# Patient Record
Sex: MALE | Race: WHITE | Employment: OTHER | URBAN - METROPOLITAN AREA
[De-identification: names, ages, dates, MRNs, and addresses within clinical notes are randomized per-mention and may not be internally consistent; named-entity substitution may affect disease eponyms.]

---

## 2017-02-22 ENCOUNTER — OFFICE VISIT (OUTPATIENT)
Dept: FAMILY MEDICINE CLINIC | Age: 67
End: 2017-02-22

## 2017-02-22 VITALS
OXYGEN SATURATION: 97 % | HEART RATE: 74 BPM | BODY MASS INDEX: 24.55 KG/M2 | WEIGHT: 162 LBS | DIASTOLIC BLOOD PRESSURE: 84 MMHG | HEIGHT: 68 IN | SYSTOLIC BLOOD PRESSURE: 140 MMHG

## 2017-02-22 DIAGNOSIS — Z00.00 ROUTINE GENERAL MEDICAL EXAMINATION AT A HEALTH CARE FACILITY: Primary | ICD-10-CM

## 2017-02-22 PROCEDURE — G0438 PPPS, INITIAL VISIT: HCPCS | Performed by: FAMILY MEDICINE

## 2017-02-22 ASSESSMENT — LIFESTYLE VARIABLES: HOW OFTEN DO YOU HAVE A DRINK CONTAINING ALCOHOL: 0

## 2017-02-22 ASSESSMENT — PATIENT HEALTH QUESTIONNAIRE - PHQ9: SUM OF ALL RESPONSES TO PHQ QUESTIONS 1-9: 0

## 2017-02-22 ASSESSMENT — ANXIETY QUESTIONNAIRES: GAD7 TOTAL SCORE: 0

## 2017-04-17 RX ORDER — ATORVASTATIN CALCIUM 80 MG/1
TABLET, FILM COATED ORAL
Qty: 90 TABLET | Refills: 0 | Status: SHIPPED | OUTPATIENT
Start: 2017-04-17 | End: 2017-04-19

## 2017-04-19 RX ORDER — ATORVASTATIN CALCIUM 80 MG/1
TABLET, FILM COATED ORAL
Qty: 90 TABLET | Refills: 0 | Status: SHIPPED | OUTPATIENT
Start: 2017-04-19 | End: 2018-01-17 | Stop reason: SDUPTHER

## 2017-05-10 ENCOUNTER — OFFICE VISIT (OUTPATIENT)
Dept: FAMILY MEDICINE CLINIC | Age: 67
End: 2017-05-10

## 2017-05-10 VITALS
OXYGEN SATURATION: 97 % | SYSTOLIC BLOOD PRESSURE: 118 MMHG | WEIGHT: 158 LBS | HEART RATE: 74 BPM | DIASTOLIC BLOOD PRESSURE: 70 MMHG | BODY MASS INDEX: 23.95 KG/M2 | HEIGHT: 68 IN

## 2017-05-10 DIAGNOSIS — E53.8 VITAMIN B 12 DEFICIENCY: ICD-10-CM

## 2017-05-10 DIAGNOSIS — E78.2 MIXED HYPERLIPIDEMIA: ICD-10-CM

## 2017-05-10 DIAGNOSIS — E05.90 HYPERTHYROIDISM: ICD-10-CM

## 2017-05-10 DIAGNOSIS — Z12.5 SPECIAL SCREENING FOR MALIGNANT NEOPLASM OF PROSTATE: ICD-10-CM

## 2017-05-10 DIAGNOSIS — F32.A DEPRESSION, UNSPECIFIED DEPRESSION TYPE: ICD-10-CM

## 2017-05-10 DIAGNOSIS — I10 ESSENTIAL HYPERTENSION: Primary | ICD-10-CM

## 2017-05-10 DIAGNOSIS — F17.200 TOBACCO DEPENDENCE: ICD-10-CM

## 2017-05-10 LAB
A/G RATIO: 1.8 (ref 1.1–2.2)
ALBUMIN SERPL-MCNC: 4.4 G/DL (ref 3.4–5)
ALP BLD-CCNC: 91 U/L (ref 40–129)
ALT SERPL-CCNC: 20 U/L (ref 10–40)
ANION GAP SERPL CALCULATED.3IONS-SCNC: 14 MMOL/L (ref 3–16)
AST SERPL-CCNC: 20 U/L (ref 15–37)
BASOPHILS ABSOLUTE: 0 K/UL (ref 0–0.2)
BASOPHILS RELATIVE PERCENT: 0.6 %
BILIRUB SERPL-MCNC: 0.4 MG/DL (ref 0–1)
BUN BLDV-MCNC: 18 MG/DL (ref 7–20)
CALCIUM SERPL-MCNC: 10 MG/DL (ref 8.3–10.6)
CHLORIDE BLD-SCNC: 100 MMOL/L (ref 99–110)
CHOLESTEROL, TOTAL: 179 MG/DL (ref 0–199)
CO2: 28 MMOL/L (ref 21–32)
CREAT SERPL-MCNC: 0.9 MG/DL (ref 0.8–1.3)
EOSINOPHILS ABSOLUTE: 0.1 K/UL (ref 0–0.6)
EOSINOPHILS RELATIVE PERCENT: 1.1 %
FOLATE: 9.6 NG/ML (ref 4.78–24.2)
GFR AFRICAN AMERICAN: >60
GFR NON-AFRICAN AMERICAN: >60
GLOBULIN: 2.4 G/DL
GLUCOSE BLD-MCNC: 99 MG/DL (ref 70–99)
HCT VFR BLD CALC: 51 % (ref 40.5–52.5)
HDLC SERPL-MCNC: 64 MG/DL (ref 40–60)
HEMOGLOBIN: 16.1 G/DL (ref 13.5–17.5)
LDL CHOLESTEROL CALCULATED: 88 MG/DL
LYMPHOCYTES ABSOLUTE: 2.1 K/UL (ref 1–5.1)
LYMPHOCYTES RELATIVE PERCENT: 30.3 %
MCH RBC QN AUTO: 27.1 PG (ref 26–34)
MCHC RBC AUTO-ENTMCNC: 31.5 G/DL (ref 31–36)
MCV RBC AUTO: 86 FL (ref 80–100)
MONOCYTES ABSOLUTE: 0.8 K/UL (ref 0–1.3)
MONOCYTES RELATIVE PERCENT: 11.6 %
NEUTROPHILS ABSOLUTE: 3.8 K/UL (ref 1.7–7.7)
NEUTROPHILS RELATIVE PERCENT: 56.4 %
PDW BLD-RTO: 16.2 % (ref 12.4–15.4)
PLATELET # BLD: 292 K/UL (ref 135–450)
PMV BLD AUTO: 8.9 FL (ref 5–10.5)
POTASSIUM SERPL-SCNC: 5.1 MMOL/L (ref 3.5–5.1)
PROSTATE SPECIFIC ANTIGEN: 1.61 NG/ML (ref 0–4)
RBC # BLD: 5.93 M/UL (ref 4.2–5.9)
SODIUM BLD-SCNC: 142 MMOL/L (ref 136–145)
TOTAL PROTEIN: 6.8 G/DL (ref 6.4–8.2)
TRIGL SERPL-MCNC: 135 MG/DL (ref 0–150)
VITAMIN B-12: 289 PG/ML (ref 211–911)
VLDLC SERPL CALC-MCNC: 27 MG/DL
WBC # BLD: 6.8 K/UL (ref 4–11)

## 2017-05-10 PROCEDURE — G8598 ASA/ANTIPLAT THER USED: HCPCS | Performed by: FAMILY MEDICINE

## 2017-05-10 PROCEDURE — 1123F ACP DISCUSS/DSCN MKR DOCD: CPT | Performed by: FAMILY MEDICINE

## 2017-05-10 PROCEDURE — 3017F COLORECTAL CA SCREEN DOC REV: CPT | Performed by: FAMILY MEDICINE

## 2017-05-10 PROCEDURE — 4040F PNEUMOC VAC/ADMIN/RCVD: CPT | Performed by: FAMILY MEDICINE

## 2017-05-10 PROCEDURE — 36415 COLL VENOUS BLD VENIPUNCTURE: CPT | Performed by: FAMILY MEDICINE

## 2017-05-10 PROCEDURE — 99214 OFFICE O/P EST MOD 30 MIN: CPT | Performed by: FAMILY MEDICINE

## 2017-05-10 PROCEDURE — G8420 CALC BMI NORM PARAMETERS: HCPCS | Performed by: FAMILY MEDICINE

## 2017-05-10 PROCEDURE — G8427 DOCREV CUR MEDS BY ELIG CLIN: HCPCS | Performed by: FAMILY MEDICINE

## 2017-05-10 PROCEDURE — 4004F PT TOBACCO SCREEN RCVD TLK: CPT | Performed by: FAMILY MEDICINE

## 2017-05-10 RX ORDER — SERTRALINE HYDROCHLORIDE 100 MG/1
TABLET, FILM COATED ORAL
Qty: 90 TABLET | Refills: 3 | Status: SHIPPED | OUTPATIENT
Start: 2017-05-10 | End: 2018-09-02 | Stop reason: SDUPTHER

## 2017-05-10 RX ORDER — MOEXIPRIL HCL 15 MG
TABLET ORAL
Qty: 90 TABLET | Refills: 3 | Status: SHIPPED | OUTPATIENT
Start: 2017-05-10 | End: 2018-09-02 | Stop reason: SDUPTHER

## 2017-05-10 ASSESSMENT — ENCOUNTER SYMPTOMS
RESPIRATORY NEGATIVE: 1
GASTROINTESTINAL NEGATIVE: 1

## 2017-05-15 ENCOUNTER — HOSPITAL ENCOUNTER (OUTPATIENT)
Dept: GENERAL RADIOLOGY | Age: 67
Discharge: HOME | End: 2017-05-15
Attending: INTERNAL MEDICINE | Admitting: INTERNAL MEDICINE

## 2017-05-15 ENCOUNTER — OFFICE VISIT (OUTPATIENT)
Dept: ENDOCRINOLOGY | Age: 67
End: 2017-05-15

## 2017-05-15 VITALS
HEART RATE: 90 BPM | WEIGHT: 157.4 LBS | HEIGHT: 68 IN | BODY MASS INDEX: 23.86 KG/M2 | OXYGEN SATURATION: 98 % | DIASTOLIC BLOOD PRESSURE: 81 MMHG | RESPIRATION RATE: 18 BRPM | SYSTOLIC BLOOD PRESSURE: 147 MMHG

## 2017-05-15 DIAGNOSIS — E05.90 HYPERTHYROIDISM: ICD-10-CM

## 2017-05-15 DIAGNOSIS — E05.90 HYPERTHYROIDISM: Primary | ICD-10-CM

## 2017-05-15 LAB
T4 FREE: 1.1 NG/DL (ref 0.9–1.8)
TSH SERPL DL<=0.05 MIU/L-ACNC: 1.27 UIU/ML (ref 0.27–4.2)

## 2017-05-15 PROCEDURE — 3017F COLORECTAL CA SCREEN DOC REV: CPT | Performed by: INTERNAL MEDICINE

## 2017-05-15 PROCEDURE — 1123F ACP DISCUSS/DSCN MKR DOCD: CPT | Performed by: INTERNAL MEDICINE

## 2017-05-15 PROCEDURE — G8598 ASA/ANTIPLAT THER USED: HCPCS | Performed by: INTERNAL MEDICINE

## 2017-05-15 PROCEDURE — 4004F PT TOBACCO SCREEN RCVD TLK: CPT | Performed by: INTERNAL MEDICINE

## 2017-05-15 PROCEDURE — G8420 CALC BMI NORM PARAMETERS: HCPCS | Performed by: INTERNAL MEDICINE

## 2017-05-15 PROCEDURE — 4040F PNEUMOC VAC/ADMIN/RCVD: CPT | Performed by: INTERNAL MEDICINE

## 2017-05-15 PROCEDURE — G8427 DOCREV CUR MEDS BY ELIG CLIN: HCPCS | Performed by: INTERNAL MEDICINE

## 2017-05-15 PROCEDURE — 99213 OFFICE O/P EST LOW 20 MIN: CPT | Performed by: INTERNAL MEDICINE

## 2017-05-16 RX ORDER — METHIMAZOLE 5 MG/1
TABLET ORAL
Qty: 30 TABLET | Refills: 5 | Status: SHIPPED | OUTPATIENT
Start: 2017-05-16 | End: 2018-05-26 | Stop reason: SDUPTHER

## 2018-01-17 RX ORDER — ATORVASTATIN CALCIUM 80 MG/1
TABLET, FILM COATED ORAL
Qty: 90 TABLET | Refills: 0 | Status: SHIPPED | OUTPATIENT
Start: 2018-01-17 | End: 2018-06-06 | Stop reason: SDUPTHER

## 2018-02-26 ENCOUNTER — OFFICE VISIT (OUTPATIENT)
Dept: FAMILY MEDICINE CLINIC | Age: 68
End: 2018-02-26

## 2018-02-26 VITALS
DIASTOLIC BLOOD PRESSURE: 64 MMHG | BODY MASS INDEX: 24.71 KG/M2 | OXYGEN SATURATION: 97 % | WEIGHT: 163 LBS | HEIGHT: 68 IN | HEART RATE: 70 BPM | SYSTOLIC BLOOD PRESSURE: 146 MMHG

## 2018-02-26 DIAGNOSIS — Z00.00 ROUTINE GENERAL MEDICAL EXAMINATION AT A HEALTH CARE FACILITY: Primary | ICD-10-CM

## 2018-02-26 PROCEDURE — G8598 ASA/ANTIPLAT THER USED: HCPCS | Performed by: FAMILY MEDICINE

## 2018-02-26 PROCEDURE — G0439 PPPS, SUBSEQ VISIT: HCPCS | Performed by: FAMILY MEDICINE

## 2018-02-26 ASSESSMENT — ANXIETY QUESTIONNAIRES: GAD7 TOTAL SCORE: 0

## 2018-02-26 ASSESSMENT — PATIENT HEALTH QUESTIONNAIRE - PHQ9: SUM OF ALL RESPONSES TO PHQ QUESTIONS 1-9: 0

## 2018-02-26 ASSESSMENT — LIFESTYLE VARIABLES: HOW OFTEN DO YOU HAVE A DRINK CONTAINING ALCOHOL: 0

## 2018-02-26 NOTE — PROGRESS NOTES
follow up appointments were made and/or referrals ordered. Positive Risk Factor Screenings with Interventions:           Substance Abuse:  Social History     Tobacco History     Smoking Status  Current Every Day Smoker Smoking Frequency  1 pack/day for 20 years (20 pk yrs) Smoking Tobacco Type  Cigarettes    Smokeless Tobacco Use  Never Used          Alcohol History     Alcohol Use Status  No          Drug Use     Drug Use Status  No          Sexual Activity     Sexually Active  Not Asked               Audit Questionnaire: Screen for Alcohol Misuse  How often do you have a drink containing alcohol?: Never  Substance Abuse Interventions:  · None indicated    General Health:  General  In general, how would you say your health is?: Good  In the past 7 days, have you experienced any of the following?: None of These  Do you get the social and emotional support that you need?: Yes  Do you have a Living Will?: (!) No  General Health Risk Interventions:  · None indicated    Health Habits/Nutrition:  Health Habits/Nutrition  Do you exercise for at least 20 minutes 2-3 times per week?: (!) No  Have you lost any weight without trying in the past 3 months?: No  Do you eat fewer than 2 meals per day?: No  Have you seen a dentist within the past year?: (!) No  Body mass index is 24.78 kg/m².   Health Habits/Nutrition Interventions:  · None indicated    Hearing/Vision:  Hearing/Vision  Do you or your family notice any trouble with your hearing?: No  Do you have difficulty driving, watching TV, or doing any of your daily activities because of your eyesight?: No  Have you had an eye exam within the past year?: (!) No  Hearing/Vision Interventions:  · None indicated    Safety:  Safety  Do you have working smoke detectors?: Yes  Have all throw rugs been removed or fastened?: Yes  Do you have non-slip mats in all bathtubs?: (!) No  Do all of your stairways have a railing or banister?: Yes  Are your doorways, halls and stairs free of

## 2018-02-26 NOTE — PATIENT INSTRUCTIONS
Personalized Preventive Plan for Gio Crespo - 2/26/2018  Medicare offers a range of preventive health benefits. Some of the tests and screenings are paid in full while other may be subject to a deductible, co-insurance, and/or copay. Some of these benefits include a comprehensive review of your medical history including lifestyle, illnesses that may run in your family, and various assessments and screenings as appropriate. After reviewing your medical record and screening and assessments performed today your provider may have ordered immunizations, labs, imaging, and/or referrals for you. A list of these orders (if applicable) as well as your Preventive Care list are included within your After Visit Summary for your review. Other Preventive Recommendations:    · A preventive eye exam performed by an eye specialist is recommended every 1-2 years to screen for glaucoma; cataracts, macular degeneration, and other eye disorders. · A preventive dental visit is recommended every 6 months. · Try to get at least 150 minutes of exercise per week or 10,000 steps per day on a pedometer . · Order or download the FREE \"Exercise & Physical Activity: Your Everyday Guide\" from [employer]. Call 5-900.684.1934 or search [employer] online. · You need 2633-2025 mg of calcium and 6801-0809 IU of vitamin D per day. It is possible to meet your calcium requirement with diet alone, but a vitamin D supplement is usually necessary to meet this goal.  · When exposed to the sun, use a sunscreen that protects against both UVA and UVB radiation with an SPF of 30 or greater. Reapply every 2 to 3 hours or after sweating, drying off with a towel, or swimming. · Always wear a seat belt when traveling in a car. Always wear a helmet when riding a bicycle or motorcycle.

## 2018-05-10 ENCOUNTER — OFFICE VISIT (OUTPATIENT)
Dept: FAMILY MEDICINE CLINIC | Age: 68
End: 2018-05-10

## 2018-05-10 VITALS
SYSTOLIC BLOOD PRESSURE: 120 MMHG | HEART RATE: 69 BPM | OXYGEN SATURATION: 96 % | WEIGHT: 162 LBS | HEIGHT: 68 IN | DIASTOLIC BLOOD PRESSURE: 66 MMHG | BODY MASS INDEX: 24.55 KG/M2

## 2018-05-10 DIAGNOSIS — F17.200 TOBACCO DEPENDENCE: ICD-10-CM

## 2018-05-10 DIAGNOSIS — I10 ESSENTIAL HYPERTENSION: Primary | ICD-10-CM

## 2018-05-10 DIAGNOSIS — Z12.5 SPECIAL SCREENING FOR MALIGNANT NEOPLASM OF PROSTATE: ICD-10-CM

## 2018-05-10 DIAGNOSIS — I25.10 CORONARY ARTERY DISEASE INVOLVING NATIVE CORONARY ARTERY OF NATIVE HEART WITHOUT ANGINA PECTORIS: ICD-10-CM

## 2018-05-10 DIAGNOSIS — E55.9 VITAMIN D DEFICIENCY: ICD-10-CM

## 2018-05-10 DIAGNOSIS — E78.2 MIXED HYPERLIPIDEMIA: ICD-10-CM

## 2018-05-10 DIAGNOSIS — F32.A DEPRESSION, UNSPECIFIED DEPRESSION TYPE: ICD-10-CM

## 2018-05-10 LAB
A/G RATIO: 1.6 (ref 1.1–2.2)
ALBUMIN SERPL-MCNC: 4.2 G/DL (ref 3.4–5)
ALP BLD-CCNC: 86 U/L (ref 40–129)
ALT SERPL-CCNC: 16 U/L (ref 10–40)
ANION GAP SERPL CALCULATED.3IONS-SCNC: 11 MMOL/L (ref 3–16)
AST SERPL-CCNC: 19 U/L (ref 15–37)
BASOPHILS ABSOLUTE: 0 K/UL (ref 0–0.2)
BASOPHILS RELATIVE PERCENT: 0.6 %
BILIRUB SERPL-MCNC: <0.2 MG/DL (ref 0–1)
BUN BLDV-MCNC: 23 MG/DL (ref 7–20)
CALCIUM SERPL-MCNC: 9.4 MG/DL (ref 8.3–10.6)
CHLORIDE BLD-SCNC: 102 MMOL/L (ref 99–110)
CHOLESTEROL, TOTAL: 185 MG/DL (ref 0–199)
CO2: 28 MMOL/L (ref 21–32)
CREAT SERPL-MCNC: 0.8 MG/DL (ref 0.8–1.3)
EOSINOPHILS ABSOLUTE: 0.1 K/UL (ref 0–0.6)
EOSINOPHILS RELATIVE PERCENT: 1 %
GFR AFRICAN AMERICAN: >60
GFR NON-AFRICAN AMERICAN: >60
GLOBULIN: 2.6 G/DL
GLUCOSE BLD-MCNC: 93 MG/DL (ref 70–99)
HCT VFR BLD CALC: 48.5 % (ref 40.5–52.5)
HDLC SERPL-MCNC: 52 MG/DL (ref 40–60)
HEMOGLOBIN: 15.7 G/DL (ref 13.5–17.5)
LDL CHOLESTEROL CALCULATED: 98 MG/DL
LYMPHOCYTES ABSOLUTE: 2.3 K/UL (ref 1–5.1)
LYMPHOCYTES RELATIVE PERCENT: 33.8 %
MCH RBC QN AUTO: 27.2 PG (ref 26–34)
MCHC RBC AUTO-ENTMCNC: 32.4 G/DL (ref 31–36)
MCV RBC AUTO: 84 FL (ref 80–100)
MONOCYTES ABSOLUTE: 0.9 K/UL (ref 0–1.3)
MONOCYTES RELATIVE PERCENT: 13.4 %
NEUTROPHILS ABSOLUTE: 3.6 K/UL (ref 1.7–7.7)
NEUTROPHILS RELATIVE PERCENT: 51.2 %
PDW BLD-RTO: 17 % (ref 12.4–15.4)
PLATELET # BLD: 253 K/UL (ref 135–450)
PMV BLD AUTO: 9 FL (ref 5–10.5)
POTASSIUM SERPL-SCNC: 4.9 MMOL/L (ref 3.5–5.1)
PROSTATE SPECIFIC ANTIGEN: 1.49 NG/ML (ref 0–4)
RBC # BLD: 5.77 M/UL (ref 4.2–5.9)
SODIUM BLD-SCNC: 141 MMOL/L (ref 136–145)
TOTAL PROTEIN: 6.8 G/DL (ref 6.4–8.2)
TRIGL SERPL-MCNC: 173 MG/DL (ref 0–150)
VITAMIN D 25-HYDROXY: 19.9 NG/ML
VLDLC SERPL CALC-MCNC: 35 MG/DL
WBC # BLD: 6.9 K/UL (ref 4–11)

## 2018-05-10 PROCEDURE — G8598 ASA/ANTIPLAT THER USED: HCPCS | Performed by: FAMILY MEDICINE

## 2018-05-10 PROCEDURE — G8427 DOCREV CUR MEDS BY ELIG CLIN: HCPCS | Performed by: FAMILY MEDICINE

## 2018-05-10 PROCEDURE — 1123F ACP DISCUSS/DSCN MKR DOCD: CPT | Performed by: FAMILY MEDICINE

## 2018-05-10 PROCEDURE — G8420 CALC BMI NORM PARAMETERS: HCPCS | Performed by: FAMILY MEDICINE

## 2018-05-10 PROCEDURE — 4004F PT TOBACCO SCREEN RCVD TLK: CPT | Performed by: FAMILY MEDICINE

## 2018-05-10 PROCEDURE — 3017F COLORECTAL CA SCREEN DOC REV: CPT | Performed by: FAMILY MEDICINE

## 2018-05-10 PROCEDURE — 4040F PNEUMOC VAC/ADMIN/RCVD: CPT | Performed by: FAMILY MEDICINE

## 2018-05-10 PROCEDURE — 99214 OFFICE O/P EST MOD 30 MIN: CPT | Performed by: FAMILY MEDICINE

## 2018-05-10 ASSESSMENT — ENCOUNTER SYMPTOMS
RESPIRATORY NEGATIVE: 1
GASTROINTESTINAL NEGATIVE: 1

## 2018-06-06 ENCOUNTER — OFFICE VISIT (OUTPATIENT)
Dept: ENDOCRINOLOGY | Age: 68
End: 2018-06-06

## 2018-06-06 ENCOUNTER — HOSPITAL ENCOUNTER (OUTPATIENT)
Dept: GENERAL RADIOLOGY | Age: 68
Discharge: HOME | End: 2018-06-06
Attending: NURSE PRACTITIONER | Admitting: NURSE PRACTITIONER

## 2018-06-06 VITALS
OXYGEN SATURATION: 96 % | HEART RATE: 62 BPM | SYSTOLIC BLOOD PRESSURE: 116 MMHG | WEIGHT: 161.4 LBS | BODY MASS INDEX: 24.46 KG/M2 | DIASTOLIC BLOOD PRESSURE: 72 MMHG | HEIGHT: 68 IN

## 2018-06-06 DIAGNOSIS — E05.90 HYPERTHYROIDISM: ICD-10-CM

## 2018-06-06 DIAGNOSIS — F17.200 TOBACCO DEPENDENCE: ICD-10-CM

## 2018-06-06 DIAGNOSIS — R73.09 ELEVATED GLUCOSE: Primary | ICD-10-CM

## 2018-06-06 DIAGNOSIS — R73.09 ELEVATED GLUCOSE: ICD-10-CM

## 2018-06-06 PROBLEM — E78.2 ELEVATED TRIGLYCERIDES WITH HIGH CHOLESTEROL: Status: ACTIVE | Noted: 2018-06-06

## 2018-06-06 PROBLEM — E78.2 ELEVATED TRIGLYCERIDES WITH HIGH CHOLESTEROL: Status: RESOLVED | Noted: 2018-06-06 | Resolved: 2018-06-06

## 2018-06-06 LAB
T4 FREE: 1.2 NG/DL (ref 0.9–1.8)
TSH SERPL DL<=0.05 MIU/L-ACNC: 1.52 UIU/ML (ref 0.27–4.2)

## 2018-06-06 PROCEDURE — G8427 DOCREV CUR MEDS BY ELIG CLIN: HCPCS | Performed by: NURSE PRACTITIONER

## 2018-06-06 PROCEDURE — 3017F COLORECTAL CA SCREEN DOC REV: CPT | Performed by: NURSE PRACTITIONER

## 2018-06-06 PROCEDURE — 4004F PT TOBACCO SCREEN RCVD TLK: CPT | Performed by: NURSE PRACTITIONER

## 2018-06-06 PROCEDURE — G8598 ASA/ANTIPLAT THER USED: HCPCS | Performed by: NURSE PRACTITIONER

## 2018-06-06 PROCEDURE — 4040F PNEUMOC VAC/ADMIN/RCVD: CPT | Performed by: NURSE PRACTITIONER

## 2018-06-06 PROCEDURE — 99214 OFFICE O/P EST MOD 30 MIN: CPT | Performed by: NURSE PRACTITIONER

## 2018-06-06 PROCEDURE — G8420 CALC BMI NORM PARAMETERS: HCPCS | Performed by: NURSE PRACTITIONER

## 2018-06-06 PROCEDURE — 1123F ACP DISCUSS/DSCN MKR DOCD: CPT | Performed by: NURSE PRACTITIONER

## 2018-06-06 RX ORDER — METHIMAZOLE 5 MG/1
TABLET ORAL
Qty: 90 TABLET | Refills: 1 | Status: SHIPPED | OUTPATIENT
Start: 2018-06-06 | End: 2019-02-14 | Stop reason: SDUPTHER

## 2018-06-06 ASSESSMENT — ENCOUNTER SYMPTOMS
ABDOMINAL PAIN: 0
SHORTNESS OF BREATH: 0
CONSTIPATION: 0
DIARRHEA: 0
BACK PAIN: 0
EYE PAIN: 0
COLOR CHANGE: 0

## 2018-06-06 ASSESSMENT — PATIENT HEALTH QUESTIONNAIRE - PHQ9
1. LITTLE INTEREST OR PLEASURE IN DOING THINGS: 0
SUM OF ALL RESPONSES TO PHQ QUESTIONS 1-9: 0
2. FEELING DOWN, DEPRESSED OR HOPELESS: 0
SUM OF ALL RESPONSES TO PHQ9 QUESTIONS 1 & 2: 0

## 2018-06-07 LAB
ESTIMATED AVERAGE GLUCOSE: 128.4 MG/DL
HBA1C MFR BLD: 6.1 %

## 2018-09-04 NOTE — TELEPHONE ENCOUNTER
.  Last office visit 5/10/2018     Last written 5-10-17 390 with 3 refills      Next office visit scheduled 5-13-19    Requested Prescriptions     Pending Prescriptions Disp Refills    sertraline (ZOLOFT) 100 MG tablet [Pharmacy Med Name: SERTRALINE 100MG TAB] 90 tablet 3     Sig: TAKE ONE TABLET BY MOUTH ONCE DAILY    moexipril (UNIVASC) 15 MG tablet [Pharmacy Med Name: MOEXIPRIL 15MG      TAB] 90 tablet 3     Sig: TAKE ONE TABLET BY MOUTH ONCE DAILY

## 2018-09-05 RX ORDER — MOEXIPRIL HCL 15 MG
TABLET ORAL
Qty: 90 TABLET | Refills: 3 | Status: SHIPPED | OUTPATIENT
Start: 2018-09-05 | End: 2019-10-18 | Stop reason: SDUPTHER

## 2018-09-05 RX ORDER — SERTRALINE HYDROCHLORIDE 100 MG/1
TABLET, FILM COATED ORAL
Qty: 90 TABLET | Refills: 3 | Status: SHIPPED | OUTPATIENT
Start: 2018-09-05 | End: 2019-09-20 | Stop reason: SDUPTHER

## 2019-02-14 DIAGNOSIS — E05.90 HYPERTHYROIDISM: Primary | ICD-10-CM

## 2019-04-02 DIAGNOSIS — E05.90 HYPERTHYROIDISM: ICD-10-CM

## 2019-04-03 ENCOUNTER — TELEPHONE (OUTPATIENT)
Dept: FAMILY MEDICINE CLINIC | Age: 69
End: 2019-04-03

## 2019-04-03 DIAGNOSIS — E05.90 HYPERTHYROIDISM: ICD-10-CM

## 2019-04-03 RX ORDER — METHIMAZOLE 5 MG/1
5 TABLET ORAL DAILY
Qty: 90 TABLET | Refills: 0 | OUTPATIENT
Start: 2019-04-03

## 2019-04-03 RX ORDER — METHIMAZOLE 5 MG/1
TABLET ORAL
Qty: 30 TABLET | Refills: 3 | Status: SHIPPED | OUTPATIENT
Start: 2019-04-03 | End: 2019-07-08 | Stop reason: SDUPTHER

## 2019-04-05 NOTE — TELEPHONE ENCOUNTER
Pt informed that I called the pharmacy and gave verbal order to change the methimazole Rx from 30 days with 3 refills to 90 days with no refills. We can renew the Rx at this apt in June.

## 2019-05-13 ENCOUNTER — OFFICE VISIT (OUTPATIENT)
Dept: FAMILY MEDICINE CLINIC | Age: 69
End: 2019-05-13
Payer: MEDICARE

## 2019-05-13 VITALS
DIASTOLIC BLOOD PRESSURE: 68 MMHG | BODY MASS INDEX: 27.19 KG/M2 | HEIGHT: 68 IN | OXYGEN SATURATION: 96 % | HEART RATE: 66 BPM | WEIGHT: 179.4 LBS | SYSTOLIC BLOOD PRESSURE: 128 MMHG

## 2019-05-13 DIAGNOSIS — Z12.5 SPECIAL SCREENING FOR MALIGNANT NEOPLASM OF PROSTATE: ICD-10-CM

## 2019-05-13 DIAGNOSIS — K76.9 LESION OF LIVER: ICD-10-CM

## 2019-05-13 DIAGNOSIS — F32.A DEPRESSION, UNSPECIFIED DEPRESSION TYPE: ICD-10-CM

## 2019-05-13 DIAGNOSIS — I10 ESSENTIAL HYPERTENSION: Primary | ICD-10-CM

## 2019-05-13 DIAGNOSIS — R73.9 HYPERGLYCEMIA: ICD-10-CM

## 2019-05-13 DIAGNOSIS — E78.2 MIXED HYPERLIPIDEMIA: ICD-10-CM

## 2019-05-13 DIAGNOSIS — N28.9 KIDNEY LESION: ICD-10-CM

## 2019-05-13 LAB
A/G RATIO: 1.4 (ref 1.1–2.2)
ALBUMIN SERPL-MCNC: 4.2 G/DL (ref 3.4–5)
ALP BLD-CCNC: 92 U/L (ref 40–129)
ALT SERPL-CCNC: 18 U/L (ref 10–40)
ANION GAP SERPL CALCULATED.3IONS-SCNC: 11 MMOL/L (ref 3–16)
AST SERPL-CCNC: 19 U/L (ref 15–37)
BASOPHILS ABSOLUTE: 0 K/UL (ref 0–0.2)
BASOPHILS RELATIVE PERCENT: 0.5 %
BILIRUB SERPL-MCNC: 0.4 MG/DL (ref 0–1)
BUN BLDV-MCNC: 19 MG/DL (ref 7–20)
CALCIUM SERPL-MCNC: 10 MG/DL (ref 8.3–10.6)
CHLORIDE BLD-SCNC: 102 MMOL/L (ref 99–110)
CHOLESTEROL, TOTAL: 206 MG/DL (ref 0–199)
CO2: 28 MMOL/L (ref 21–32)
CREAT SERPL-MCNC: 1.1 MG/DL (ref 0.8–1.3)
EOSINOPHILS ABSOLUTE: 0.1 K/UL (ref 0–0.6)
EOSINOPHILS RELATIVE PERCENT: 1.1 %
GFR AFRICAN AMERICAN: >60
GFR NON-AFRICAN AMERICAN: >60
GLOBULIN: 3.1 G/DL
GLUCOSE BLD-MCNC: 107 MG/DL (ref 70–99)
HCT VFR BLD CALC: 44.5 % (ref 40.5–52.5)
HDLC SERPL-MCNC: 55 MG/DL (ref 40–60)
HEMOGLOBIN: 14.3 G/DL (ref 13.5–17.5)
LDL CHOLESTEROL CALCULATED: 128 MG/DL
LYMPHOCYTES ABSOLUTE: 1.8 K/UL (ref 1–5.1)
LYMPHOCYTES RELATIVE PERCENT: 36.8 %
MCH RBC QN AUTO: 26.5 PG (ref 26–34)
MCHC RBC AUTO-ENTMCNC: 32.1 G/DL (ref 31–36)
MCV RBC AUTO: 82.7 FL (ref 80–100)
MONOCYTES ABSOLUTE: 0.9 K/UL (ref 0–1.3)
MONOCYTES RELATIVE PERCENT: 17.4 %
NEUTROPHILS ABSOLUTE: 2.2 K/UL (ref 1.7–7.7)
NEUTROPHILS RELATIVE PERCENT: 44.2 %
PDW BLD-RTO: 16.5 % (ref 12.4–15.4)
PLATELET # BLD: 233 K/UL (ref 135–450)
PMV BLD AUTO: 8.5 FL (ref 5–10.5)
POTASSIUM SERPL-SCNC: 5 MMOL/L (ref 3.5–5.1)
PROSTATE SPECIFIC ANTIGEN: 2.06 NG/ML (ref 0–4)
RBC # BLD: 5.38 M/UL (ref 4.2–5.9)
SODIUM BLD-SCNC: 141 MMOL/L (ref 136–145)
TOTAL PROTEIN: 7.3 G/DL (ref 6.4–8.2)
TRIGL SERPL-MCNC: 113 MG/DL (ref 0–150)
VLDLC SERPL CALC-MCNC: 23 MG/DL
WBC # BLD: 5 K/UL (ref 4–11)

## 2019-05-13 PROCEDURE — 3017F COLORECTAL CA SCREEN DOC REV: CPT | Performed by: FAMILY MEDICINE

## 2019-05-13 PROCEDURE — 99214 OFFICE O/P EST MOD 30 MIN: CPT | Performed by: FAMILY MEDICINE

## 2019-05-13 PROCEDURE — G8427 DOCREV CUR MEDS BY ELIG CLIN: HCPCS | Performed by: FAMILY MEDICINE

## 2019-05-13 PROCEDURE — G8598 ASA/ANTIPLAT THER USED: HCPCS | Performed by: FAMILY MEDICINE

## 2019-05-13 PROCEDURE — 4040F PNEUMOC VAC/ADMIN/RCVD: CPT | Performed by: FAMILY MEDICINE

## 2019-05-13 PROCEDURE — 1123F ACP DISCUSS/DSCN MKR DOCD: CPT | Performed by: FAMILY MEDICINE

## 2019-05-13 PROCEDURE — 1036F TOBACCO NON-USER: CPT | Performed by: FAMILY MEDICINE

## 2019-05-13 PROCEDURE — G8419 CALC BMI OUT NRM PARAM NOF/U: HCPCS | Performed by: FAMILY MEDICINE

## 2019-05-13 ASSESSMENT — ENCOUNTER SYMPTOMS
COUGH: 0
ABDOMINAL PAIN: 0
SHORTNESS OF BREATH: 0

## 2019-05-13 NOTE — PROGRESS NOTES
for sleep disturbance. The patient is not nervous/anxious. Objective:   Physical Exam      Physical Exam   Constitutional: He is oriented to person, place, and time. He appears well-developed and well-nourished. HENT:   Head: Normocephalic. Mouth/Throat: Oropharynx is clear and moist.   Eyes: Conjunctivae are normal.   Neck: Neck supple. Carotid bruit is not present. No thyromegaly present. Cardiovascular: Normal rate, regular rhythm and normal heart sounds. Pulmonary/Chest: Effort normal. He has decreased breath sounds. Abdominal: Soft. He exhibits no distension and no mass. There is no tenderness. Musculoskeletal: Normal range of motion. He exhibits no edema. Lymphadenopathy:     He has no cervical adenopathy. Neurological: He is alert and oriented to person, place, and time. Skin: Skin is warm and dry. Psychiatric: He has a normal mood and affect. His behavior is normal. Judgment and thought content normal.       Assessment:       Diagnosis Orders   1. Essential hypertension     2. Depression, unspecified depression type     3. Mixed hyperlipidemia     4. Special screening for malignant neoplasm of prostate           Plan:      Mateusz Esquivel was seen today for 1 year follow up.     Diagnoses and all orders for this visit:    Essential hypertension  Continue meds-ROBERTO diet-keep wt down  Depression, unspecified depression type  Continue meds-keep active  Mixed hyperlipidemia  Lab now  Special screening for malignant neoplasm of prostate  Lab now    Refer for Renal & Liver US    See me 1 yr  [place] Kelly DO

## 2019-05-13 NOTE — PATIENT INSTRUCTIONS
Essential hypertension  Continue meds-ROBERTO diet-keep wt down  Depression, unspecified depression type  Continue meds-keep active  Mixed hyperlipidemia  Lab now  Special screening for malignant neoplasm of prostate  Lab now        See me 1 yr

## 2019-05-14 LAB
ESTIMATED AVERAGE GLUCOSE: 128.4 MG/DL
HBA1C MFR BLD: 6.1 %

## 2019-05-20 ENCOUNTER — HOSPITAL ENCOUNTER (OUTPATIENT)
Dept: ULTRASOUND IMAGING | Age: 69
Discharge: HOME | End: 2019-05-20
Payer: MEDICARE

## 2019-05-20 DIAGNOSIS — N28.9 KIDNEY LESION: ICD-10-CM

## 2019-05-20 DIAGNOSIS — K76.9 LESION OF LIVER: ICD-10-CM

## 2019-05-20 PROCEDURE — 76775 US EXAM ABDO BACK WALL LIM: CPT

## 2019-05-20 PROCEDURE — 76705 ECHO EXAM OF ABDOMEN: CPT

## 2019-05-22 ENCOUNTER — TELEPHONE (OUTPATIENT)
Dept: FAMILY MEDICINE CLINIC | Age: 69
End: 2019-05-22

## 2019-05-22 DIAGNOSIS — N28.9 LESION OF BOTH NATIVE KIDNEYS: Primary | ICD-10-CM

## 2019-05-22 NOTE — RESULT ENCOUNTER NOTE
I just sent a [facility] to him to schedule an MRI of liver & kidneys--call him to make sure he reads message.

## 2019-05-30 ENCOUNTER — TELEPHONE (OUTPATIENT)
Dept: FAMILY MEDICINE CLINIC | Age: 69
End: 2019-05-30

## 2019-05-30 ENCOUNTER — HOSPITAL ENCOUNTER (OUTPATIENT)
Dept: MRI IMAGING | Age: 69
Discharge: HOME | End: 2019-05-30
Payer: MEDICARE

## 2019-05-30 DIAGNOSIS — N28.9 LESION OF BOTH NATIVE KIDNEYS: ICD-10-CM

## 2019-05-30 PROCEDURE — A9579 GAD-BASE MR CONTRAST NOS,1ML: HCPCS | Performed by: FAMILY MEDICINE

## 2019-05-30 PROCEDURE — 6360000004 HC RX CONTRAST MEDICATION: Performed by: FAMILY MEDICINE

## 2019-05-30 PROCEDURE — 74183 MRI ABD W/O CNTR FLWD CNTR: CPT

## 2019-05-30 RX ADMIN — GADOTERIDOL 15 ML: 279.3 INJECTION, SOLUTION INTRAVENOUS at 10:15

## 2019-05-30 NOTE — TELEPHONE ENCOUNTER
Scheduled for MRI at 9:30 am today. Asking what exactly the doctor wants imaged. Order is for abdomen w contrast. Wants to see liver and kidneys. Does he want a liver protocol? If so will not be a dedicated kidney protocol but you will still see the kidneys and they will be evaluated. To confirm it is to be done as ordered or with any questions please call Terese Watson at [phone].

## 2019-05-31 ENCOUNTER — TELEPHONE (OUTPATIENT)
Dept: FAMILY MEDICINE CLINIC | Age: 69
End: 2019-05-31

## 2019-06-05 ENCOUNTER — OFFICE VISIT (OUTPATIENT)
Dept: ENDOCRINOLOGY | Age: 69
End: 2019-06-05
Payer: MEDICARE

## 2019-06-05 ENCOUNTER — HOSPITAL ENCOUNTER (OUTPATIENT)
Age: 69
Discharge: HOME | End: 2019-06-05
Payer: MEDICARE

## 2019-06-05 VITALS
BODY MASS INDEX: 27.31 KG/M2 | HEIGHT: 68 IN | DIASTOLIC BLOOD PRESSURE: 73 MMHG | SYSTOLIC BLOOD PRESSURE: 126 MMHG | HEART RATE: 62 BPM | WEIGHT: 180.2 LBS

## 2019-06-05 DIAGNOSIS — R73.03 PREDIABETES: ICD-10-CM

## 2019-06-05 DIAGNOSIS — E05.90 HYPERTHYROIDISM: Primary | ICD-10-CM

## 2019-06-05 DIAGNOSIS — E05.90 HYPERTHYROIDISM: ICD-10-CM

## 2019-06-05 DIAGNOSIS — F17.200 TOBACCO DEPENDENCE: ICD-10-CM

## 2019-06-05 DIAGNOSIS — E04.1 THYROID NODULE: ICD-10-CM

## 2019-06-05 LAB
T3 FREE: 3.2 PG/ML (ref 2.3–4.2)
T4 FREE: 0.9 NG/DL (ref 0.9–1.8)
TSH SERPL DL<=0.05 MIU/L-ACNC: 2.53 UIU/ML (ref 0.27–4.2)

## 2019-06-05 PROCEDURE — 99214 OFFICE O/P EST MOD 30 MIN: CPT | Performed by: NURSE PRACTITIONER

## 2019-06-05 PROCEDURE — 1036F TOBACCO NON-USER: CPT | Performed by: NURSE PRACTITIONER

## 2019-06-05 PROCEDURE — 3017F COLORECTAL CA SCREEN DOC REV: CPT | Performed by: NURSE PRACTITIONER

## 2019-06-05 PROCEDURE — G8427 DOCREV CUR MEDS BY ELIG CLIN: HCPCS | Performed by: NURSE PRACTITIONER

## 2019-06-05 PROCEDURE — 84481 FREE ASSAY (FT-3): CPT

## 2019-06-05 PROCEDURE — 84443 ASSAY THYROID STIM HORMONE: CPT

## 2019-06-05 PROCEDURE — G8419 CALC BMI OUT NRM PARAM NOF/U: HCPCS | Performed by: NURSE PRACTITIONER

## 2019-06-05 PROCEDURE — 84439 ASSAY OF FREE THYROXINE: CPT

## 2019-06-05 PROCEDURE — 36415 COLL VENOUS BLD VENIPUNCTURE: CPT

## 2019-06-05 PROCEDURE — G8598 ASA/ANTIPLAT THER USED: HCPCS | Performed by: NURSE PRACTITIONER

## 2019-06-05 PROCEDURE — 4040F PNEUMOC VAC/ADMIN/RCVD: CPT | Performed by: NURSE PRACTITIONER

## 2019-06-05 PROCEDURE — 1123F ACP DISCUSS/DSCN MKR DOCD: CPT | Performed by: NURSE PRACTITIONER

## 2019-06-05 ASSESSMENT — ENCOUNTER SYMPTOMS
COLOR CHANGE: 0
CONSTIPATION: 0
EYE PAIN: 0
DIARRHEA: 0
ABDOMINAL PAIN: 0
SHORTNESS OF BREATH: 0
BACK PAIN: 0

## 2019-06-05 NOTE — PROGRESS NOTES
lengths of 5.4 and 4.7 cm for the right and   left lobes, respectively. There is uniform, homogeneous uptake   throughout both lobes without focal area of increased or decreased   uptake.      IMPRESSION: Findings may suggest a diffuse, toxic goiter, i.e.   Graves' disease. Uptake is within normal range though the final   determinant of toxicity is a suppressed serum TSH      US THYROID 05/14       COMPARISON: No prior ultrasound for comparison.       CLINICAL INDICATION: Thyromegaly. Hyperthyroidism.       FINDINGS: Right lobe of thyroid measures approximately 5.2 x 2.3 x   2.0 cm. An isoechoic nodule at the upper pole measures   approximately 1.2 x 0.7 x 0.9 cm. A hypoechoic nodule just   posterior measures approximately 0.5 x 0.4 x 0.3 cm.       Left lobe of thyroid measures approximately 5.5 x 2.3 x 1.4 cm. An   isoechoic nodule within measures approximately 0.7 x 0.6 x 1.0 cm. A cystic posterior nodule measures 1.0 x 0.8 x 0.8 cm. No   increased flow is seen to the region.       The isthmus measures 0.4 cm in AP dimension.           Impression   IMPRESSION:    1. Several small nonspecific thyroid nodules are seen bilaterally. No evidence of large dominant nodule.        Past Medical History:   Diagnosis Date    CAD (coronary artery disease)     Depression     ADJUSTMENT DISORDER    Heart attack ([facility] [address] 10/10    History of renal calculi     Hyperlipidemia     Hypertension     Hyperthyroidism      Family History   Problem Relation Age of Onset    Hearing Loss Mother     Heart Disease Mother     High Blood Pressure Mother     High Cholesterol Mother     Heart Disease Father     High Blood Pressure Father     High Cholesterol Father     Arthritis Sister     High Cholesterol Sister     Stroke Maternal Aunt      Current Outpatient Medications   Medication Sig Dispense Refill    methimazole (TAPAZOLE) 5 MG tablet TAKE 1 TABLET BY MOUTH ONCE DAILY (NEED  TO  MAKE  FOLLOW  UP  APPOINTMENT  FOR REFILLS) 30 tablet 3    sertraline (ZOLOFT) 100 MG tablet TAKE ONE TABLET BY MOUTH ONCE DAILY 90 tablet 3    moexipril (UNIVASC) 15 MG tablet TAKE ONE TABLET BY MOUTH ONCE DAILY 90 tablet 3    atorvastatin (LIPITOR) 80 MG tablet TAKE ONE TABLET BY MOUTH ONCE DAILY 90 tablet 3    metoprolol (TOPROL-XL) 25 MG XL tablet Take 25 mg by mouth daily.  aspirin 81 MG tablet Take 81 mg by mouth daily. otc       No current facility-administered medications for this visit. Review of Systems   Constitutional: Negative for activity change, appetite change, diaphoresis, fatigue and unexpected weight change. Eyes: Negative for pain and visual disturbance. Respiratory: Negative for shortness of breath. Cardiovascular: Negative for palpitations and leg swelling. Gastrointestinal: Negative for abdominal pain, constipation and diarrhea. Endocrine: Negative for cold intolerance, heat intolerance, polydipsia, polyphagia and polyuria. Musculoskeletal: Negative for back pain, gait problem, myalgias and neck pain. Skin: Negative for color change and pallor. Neurological: Negative for dizziness, weakness, light-headedness and headaches. Hematological: Does not bruise/bleed easily. Psychiatric/Behavioral: Negative for sleep disturbance. The patient is not nervous/anxious and is not hyperactive. Vitals:    06/05/19 1053   BP: 126/73   Pulse: 62   Weight: 180 lb 3.2 oz (81.7 kg)   Height: 5' 8\" (1.727 m)     Physical Exam   Constitutional: He is oriented to person, place, and time. He appears well-developed and well-nourished. HENT:   Head: Normocephalic and atraumatic. Eyes: Pupils are equal, round, and reactive to light. Conjunctivae and EOM are normal.   No evidence of proptosis   Neck: Normal range of motion. Neck supple. No thyromegaly present. Cardiovascular: Normal rate and regular rhythm. Pulmonary/Chest: Effort normal and breath sounds normal.   Abdominal: Soft.  Bowel sounds are normal. Musculoskeletal: Normal range of motion. He exhibits no edema or tenderness. Neurological: He is alert and oriented to person, place, and time. No tremors noted on outstretched arms   Skin: Skin is warm and dry. He is not diaphoretic. No skin changes b/l shins   Psychiatric: He has a normal mood and affect. His behavior is normal. Judgment and thought content normal.     Assessment  Manjeet Eng is a 69year old male with a h/o stable hyperthyroidism for > 5 years and controlled on 5 mg methimazole. Smokes 1 ppd for several years. He has a PMH of hypertension, hyperlipidemia, CAD s/p stent x 2, renal stones. Plan  Problem List Items Addressed This Visit     Tobacco dependence     Quit in January 2019  Reports significant of respiratory issues         Hyperthyroidism - Primary     Previous labs in ref range and stable  No new symptoms         Relevant Orders    TSH without Reflex (Completed)    T4, Free (Completed)    T3, Free (Completed)    US Head Neck Soft Tissue Thyroid    Thyroid nodule     Repeat US to evaluate any change in nodules - last  In 2014         Relevant Orders    US Head Neck Soft Tissue Thyroid    Prediabetes     Lab Results   Component Value Date    LABA1C 6.1 05/13/2019     Lab Results   Component Value Date    .4 05/13/2019     Reviewed dietary and lifestyle modifications  Drinks regular soda and beverages             Return in about 1 year (around 6/5/2020).

## 2019-06-06 NOTE — ASSESSMENT & PLAN NOTE
Lab Results   Component Value Date    LABA1C 6.1 05/13/2019     Lab Results   Component Value Date    .4 05/13/2019     Reviewed dietary and lifestyle modifications  Drinks regular soda and beverages

## 2019-06-11 ENCOUNTER — HOSPITAL ENCOUNTER (OUTPATIENT)
Dept: ULTRASOUND IMAGING | Age: 69
Discharge: HOME | End: 2019-06-11
Payer: MEDICARE

## 2019-06-11 DIAGNOSIS — E04.1 THYROID NODULE: ICD-10-CM

## 2019-06-11 DIAGNOSIS — E05.90 HYPERTHYROIDISM: ICD-10-CM

## 2019-06-11 PROCEDURE — 76536 US EXAM OF HEAD AND NECK: CPT

## 2019-06-24 ENCOUNTER — TELEPHONE (OUTPATIENT)
Dept: ENDOCRINOLOGY | Age: 69
End: 2019-06-24

## 2019-06-24 NOTE — TELEPHONE ENCOUNTER
Spoke to wife per [employer] and gave results    Reviewed results. No changes needed at this time.  Left VM for patient

## 2019-07-08 DIAGNOSIS — E05.90 HYPERTHYROIDISM: ICD-10-CM

## 2019-07-08 RX ORDER — METHIMAZOLE 5 MG/1
TABLET ORAL
Qty: 30 TABLET | Refills: 3 | Status: SHIPPED | OUTPATIENT
Start: 2019-07-08 | End: 2019-07-11 | Stop reason: SDUPTHER

## 2019-07-11 DIAGNOSIS — E05.90 HYPERTHYROIDISM: ICD-10-CM

## 2019-07-11 RX ORDER — METHIMAZOLE 5 MG/1
TABLET ORAL
Qty: 90 TABLET | Refills: 2 | Status: SHIPPED | OUTPATIENT
Start: 2019-07-11 | End: 2020-03-23 | Stop reason: SDUPTHER

## 2019-08-15 ENCOUNTER — TELEPHONE (OUTPATIENT)
Dept: FAMILY MEDICINE CLINIC | Age: 69
End: 2019-08-15

## 2020-03-23 RX ORDER — METHIMAZOLE 5 MG/1
TABLET ORAL
Qty: 90 TABLET | Refills: 0 | Status: SHIPPED | OUTPATIENT
Start: 2020-03-23 | End: 2020-05-19

## 2020-05-11 ENCOUNTER — TELEPHONE (OUTPATIENT)
Dept: FAMILY MEDICINE CLINIC | Age: 70
End: 2020-05-11

## 2020-05-11 RX ORDER — MOEXIPRIL HCL 15 MG
TABLET ORAL
Qty: 90 TABLET | Refills: 0 | Status: SHIPPED | OUTPATIENT
Start: 2020-05-11 | End: 2020-08-15 | Stop reason: SDUPTHER

## 2020-05-11 NOTE — TELEPHONE ENCOUNTER
Last office visit 5/13/2019     Last written 10-1-2019 x 3 months x 1 refill    Next office visit scheduled 5/13/2020    Requested Prescriptions     Pending Prescriptions Disp Refills    moexipril (UNIVASC) 15 MG tablet 90 tablet 1     Sig: TAKE 1 TABLET BY MOUTH ONCE DAILY

## 2020-05-18 NOTE — TELEPHONE ENCOUNTER
Medication:   Requested Prescriptions     Pending Prescriptions Disp Refills    methIMAzole (TAPAZOLE) 5 MG tablet [Pharmacy Med Name: METHIMAZOLE 5 MG Tablet] 90 tablet 0     Sig: TAKE 1 TABLET BY MOUTH ONCE DAILY (NEED  TO  MAKE  FOLLOW  UP  APPOINTMENT  FOR  REFILLS)       Last appt: 6/5/2019   Next appt: Visit date not found    Last OARRS: No flowsheet data found.

## 2020-05-19 RX ORDER — METHIMAZOLE 5 MG/1
TABLET ORAL
Qty: 30 TABLET | Refills: 0 | Status: SHIPPED | OUTPATIENT
Start: 2020-05-19 | End: 2020-06-23

## 2020-06-23 RX ORDER — METHIMAZOLE 5 MG/1
TABLET ORAL
Qty: 30 TABLET | Refills: 0 | Status: SHIPPED | OUTPATIENT
Start: 2020-06-23 | End: 2020-07-14

## 2020-07-13 NOTE — TELEPHONE ENCOUNTER
Medication:   Requested Prescriptions     Pending Prescriptions Disp Refills    methIMAzole (TAPAZOLE) 5 MG tablet [Pharmacy Med Name: METHIMAZOLE 5 MG Tablet] 30 tablet 0     Sig: TAKE 1 TABLET  ONCE DAILY (NEED  TO  MAKE  FOLLOW  UP  APPOINTMENT  FOR  REFILLS)         Last appt: 6/5/2019   Next appt: Visit date not found    Last OARRS: No flowsheet data found.

## 2020-07-14 RX ORDER — METHIMAZOLE 5 MG/1
TABLET ORAL
Qty: 30 TABLET | Refills: 0 | Status: SHIPPED | OUTPATIENT
Start: 2020-07-14 | End: 2020-08-05 | Stop reason: SDUPTHER

## 2020-08-05 RX ORDER — METHIMAZOLE 5 MG/1
TABLET ORAL
Qty: 30 TABLET | Refills: 0 | OUTPATIENT
Start: 2020-08-05

## 2020-08-05 RX ORDER — METHIMAZOLE 5 MG/1
TABLET ORAL
Qty: 30 TABLET | Refills: 0 | Status: SHIPPED | OUTPATIENT
Start: 2020-08-05 | End: 2020-08-06 | Stop reason: SDUPTHER

## 2020-08-06 ENCOUNTER — TELEPHONE (OUTPATIENT)
Dept: ENDOCRINOLOGY | Age: 70
End: 2020-08-06

## 2020-08-06 NOTE — TELEPHONE ENCOUNTER
Medication:   Requested Prescriptions     Pending Prescriptions Disp Refills    methIMAzole (TAPAZOLE) 5 MG tablet 30 tablet 0     Sig: Take 1 tablet by mouth once daily       Last appt: 06/05/2019  Next appt: Visit date not found    Last OARRS: No flowsheet data found.

## 2020-08-07 RX ORDER — METHIMAZOLE 5 MG/1
TABLET ORAL
Qty: 30 TABLET | Refills: 0 | Status: SHIPPED | OUTPATIENT
Start: 2020-08-07 | End: 2020-10-05

## 2020-08-26 ENCOUNTER — OFFICE VISIT (OUTPATIENT)
Dept: FAMILY MEDICINE CLINIC | Age: 70
End: 2020-08-26
Payer: MEDICARE

## 2020-08-26 VITALS
HEART RATE: 67 BPM | WEIGHT: 185 LBS | HEIGHT: 68 IN | OXYGEN SATURATION: 97 % | TEMPERATURE: 98.4 F | BODY MASS INDEX: 28.04 KG/M2 | DIASTOLIC BLOOD PRESSURE: 70 MMHG | SYSTOLIC BLOOD PRESSURE: 142 MMHG

## 2020-08-26 DIAGNOSIS — E05.90 HYPERTHYROIDISM: ICD-10-CM

## 2020-08-26 LAB
BASOPHILS ABSOLUTE: 0 K/UL (ref 0–0.2)
BASOPHILS RELATIVE PERCENT: 0.7 %
EOSINOPHILS ABSOLUTE: 0.1 K/UL (ref 0–0.6)
EOSINOPHILS RELATIVE PERCENT: 1 %
HCT VFR BLD CALC: 46.1 % (ref 40.5–52.5)
HEMOGLOBIN: 14.7 G/DL (ref 13.5–17.5)
LYMPHOCYTES ABSOLUTE: 1.8 K/UL (ref 1–5.1)
LYMPHOCYTES RELATIVE PERCENT: 31.2 %
MCH RBC QN AUTO: 26.4 PG (ref 26–34)
MCHC RBC AUTO-ENTMCNC: 31.9 G/DL (ref 31–36)
MCV RBC AUTO: 82.9 FL (ref 80–100)
MONOCYTES ABSOLUTE: 1.1 K/UL (ref 0–1.3)
MONOCYTES RELATIVE PERCENT: 18.1 %
NEUTROPHILS ABSOLUTE: 2.9 K/UL (ref 1.7–7.7)
NEUTROPHILS RELATIVE PERCENT: 49 %
PDW BLD-RTO: 18.4 % (ref 12.4–15.4)
PLATELET # BLD: 214 K/UL (ref 135–450)
PMV BLD AUTO: 8.8 FL (ref 5–10.5)
RBC # BLD: 5.56 M/UL (ref 4.2–5.9)
T3 FREE: 2.7 PG/ML (ref 2.3–4.2)
T4 FREE: 1 NG/DL (ref 0.9–1.8)
TSH SERPL DL<=0.05 MIU/L-ACNC: 1.84 UIU/ML (ref 0.27–4.2)
WBC # BLD: 5.9 K/UL (ref 4–11)

## 2020-08-26 PROCEDURE — 99214 OFFICE O/P EST MOD 30 MIN: CPT | Performed by: FAMILY MEDICINE

## 2020-08-26 PROCEDURE — G0009 ADMIN PNEUMOCOCCAL VACCINE: HCPCS | Performed by: FAMILY MEDICINE

## 2020-08-26 PROCEDURE — 90732 PPSV23 VACC 2 YRS+ SUBQ/IM: CPT | Performed by: FAMILY MEDICINE

## 2020-08-26 ASSESSMENT — ENCOUNTER SYMPTOMS
CHEST TIGHTNESS: 0
BLOOD IN STOOL: 0
CONSTIPATION: 0
ABDOMINAL PAIN: 0
SHORTNESS OF BREATH: 1
COUGH: 0

## 2020-08-26 NOTE — PATIENT INSTRUCTIONS
Essential hypertension  -     CBC Auto Differential  -     Comprehensive Metabolic Panel  -     Lipid Panel  Continue medications and no added salt diet-keep weight down and stay as active as possible. Lab work today  Hyperthyroidism  Continue seeing endocrinologist.  Anxiety  Continue medications and keep me posted on if you ever feel like he needs an increase in dose or or change. Mixed hyperlipidemia  -     CBC Auto Differential  -     Comprehensive Metabolic Panel  -     Lipid Panel  Lab now  Coronary artery disease involving native coronary artery of native heart without angina pectoris  Continue medications and visit with the cardiologist when scheduled  Prediabetes  -     Hemoglobin A1C  Lab now  Special screening for malignant neoplasm of prostate  -     Psa screening  Lab now  [facility]aud[facility] ([facility]  -     VL DUP LOWER EXTREMITY ARTERIES BILATERAL; Future  Refer for arterial studies of the lower extremities. Pulmonary emphysema, unspecified emphysema type ([facility] [address]  Prescription sent for Advair let me know in the next 3 weeks-call with update  Other orders  -     PNEUMOVAX 23 subcutaneous/IM (Pneumococcal polysaccharide vaccine 23-valent >= 1yo)  -     fluticasone-salmeterol (ADVAIR DISKUS) 250-50 MCG/DOSE AEPB;  Inhale 1 puff into the lungs every 12 hours    See me 1 year        Eunice Nice, DO

## 2020-08-26 NOTE — PROGRESS NOTES
Subjective:      Patient ID: Colletta Gables is a 68 y.o. male. HPI  In for check on several medical issues. Hypertension-blood pressure 140/80 or below when he checks it at home or elsewhere-he does see a cardiologist.  Hyperthyroidism-he sees a thyroid specialist at least once a year. Coronary artery disease-as mentioned above he sees cardiologist.  Anxiety-on Zoloft and still working well and he overall feels much better. He does need some lab work today. He does mention that he is has some pain in his right calf used to be both of them after he walks to the barn and if he sits down he is okay and he can get up and walk again. He did have vascular studies approximately 2 years ago which did show abnormal HARRIS index. He also states that he quit smoking a number of years ago but he does notice that at least once a day he gets a little short winded-not a big deal but it happens. He was told he had emphysema on a CT scan 5 or 6 years ago. He denies any other issues to discuss. Prior to Visit Medications :  Medication fluticasone-salmeterol (ADVAIR DISKUS) 250-50 MCG/DOSE AEPB, Sig Inhale 1 puff into the lungs every 12 hours, Taking? Yes, Authorizing Provider Godfrey Mejia, DO    Medication sertraline (ZOLOFT) 100 MG tablet, Sig Take one tab by mouth daily, Taking? Yes, Authorizing Provider Godfrey Mejia, DO    Medication atorvastatin (LIPITOR) 80 MG tablet, Sig Take 1 tab by mouth daily, Taking? Yes, Authorizing Provider Godfrey Mejia, DO    Medication moexipril (UNIVASC) 15 MG tablet, Sig TAKE 1 TABLET BY MOUTH ONCE DAILY, Taking? Yes, Authorizing Provider Godfrey Mejia, DO    Medication methIMAzole (TAPAZOLE) 5 MG tablet, Sig Take 1 tablet by mouth once daily, Taking? Yes, Authorizing Provider NEWTON Norman - CNP    Medication metoprolol (TOPROL-XL) 25 MG XL tablet, Sig Take 25 mg by mouth daily. , Taking?  Yes, Authorizing Provider Historical Provider, MD    Medication aspirin 81 MG tablet, Sig Take 81 mg by mouth daily. otc, Taking? Yes, Authorizing Provider Historical Provider, MD      Past Medical History:  No date: CAD (coronary artery disease)  No date: Depression      Comment:  ADJUSTMENT DISORDER  10/10: Heart attack ([address]  No date: History of renal calculi  No date: Hyperlipidemia  No date: Hypertension  No date: Hyperthyroidism        Review of Systems    Review of Systems   Constitutional: Negative for fever and unexpected weight change. HENT: Negative for congestion and postnasal drip. Eyes: Negative for visual disturbance. Respiratory: Positive for shortness of breath. Negative for cough and chest tightness. See HPI   Cardiovascular: Negative for chest pain, palpitations and leg swelling. Probable claudication-see HPI   Gastrointestinal: Negative for abdominal pain, blood in stool and constipation. Genitourinary: Negative for frequency and hematuria. Musculoskeletal: Positive for arthralgias. Negative for myalgias. Skin: Negative for rash. Neurological: Negative for tremors and headaches. Psychiatric/Behavioral: Negative for sleep disturbance. The patient is not nervous/anxious. Objective:   Physical Exam      Physical Exam  Constitutional:       Appearance: Normal appearance. He is well-developed and normal weight. HENT:      Head: Normocephalic. Mouth/Throat:      Mouth: Mucous membranes are moist.      Pharynx: Oropharynx is clear. Eyes:      Conjunctiva/sclera: Conjunctivae normal.   Neck:      Musculoskeletal: Neck supple. Thyroid: No thyromegaly. Vascular: No carotid bruit. Cardiovascular:      Rate and Rhythm: Normal rate and regular rhythm. Heart sounds: Normal heart sounds. Comments: Unable to palpate any pulses lower extremities. Pulmonary:      Effort: Pulmonary effort is normal.      Comments: Decreased breath sounds bilaterally moderately but clear. Abdominal:      General: There is no distension.       Palpations: Abdomen is soft. There is no mass. Tenderness: There is no abdominal tenderness. Musculoskeletal: Normal range of motion. Right lower leg: No edema. Left lower leg: No edema. Lymphadenopathy:      Cervical: No cervical adenopathy. Skin:     General: Skin is warm and dry. Neurological:      Mental Status: He is alert and oriented to person, place, and time. Psychiatric:         Mood and Affect: Mood normal.         Behavior: Behavior normal.         Thought Content: Thought content normal.         Judgment: Judgment normal.         Assessment:       Diagnosis Orders   1. Essential hypertension  CBC Auto Differential    Comprehensive Metabolic Panel    Lipid Panel   2. Hyperthyroidism     3. Anxiety     4. Mixed hyperlipidemia  CBC Auto Differential    Comprehensive Metabolic Panel    Lipid Panel   5. Coronary artery disease involving native coronary artery of native heart without angina pectoris     6. Prediabetes  Hemoglobin A1C   7. Special screening for malignant neoplasm of prostate  Psa screening   8. Claudication ([facility] [address]  VL DUP LOWER EXTREMITY ARTERIES BILATERAL   9. Pulmonary emphysema, unspecified emphysema type ([facility] [address]           Plan:      Cheyenne Sanders was seen today for 1 year follow up. Diagnoses and all orders for this visit:    Essential hypertension  -     CBC Auto Differential  -     Comprehensive Metabolic Panel  -     Lipid Panel  Continue medications and no added salt diet-keep weight down and stay as active as possible. Lab work today  Hyperthyroidism  Continue seeing endocrinologist.  Anxiety  Continue medications and keep me posted on if you ever feel like he needs an increase in dose or or change.   Mixed hyperlipidemia  -     CBC Auto Differential  -     Comprehensive Metabolic Panel  -     Lipid Panel  Lab now  Coronary artery disease involving native coronary artery of native heart without angina pectoris  Continue medications and visit with the cardiologist when

## 2020-08-27 LAB
A/G RATIO: 1.6 (ref 1.1–2.2)
ALBUMIN SERPL-MCNC: 4.4 G/DL (ref 3.4–5)
ALP BLD-CCNC: 68 U/L (ref 40–129)
ALT SERPL-CCNC: 17 U/L (ref 10–40)
ANION GAP SERPL CALCULATED.3IONS-SCNC: 8 MMOL/L (ref 3–16)
AST SERPL-CCNC: 20 U/L (ref 15–37)
BILIRUB SERPL-MCNC: 0.5 MG/DL (ref 0–1)
BUN BLDV-MCNC: 25 MG/DL (ref 7–20)
CALCIUM SERPL-MCNC: 10.1 MG/DL (ref 8.3–10.6)
CHLORIDE BLD-SCNC: 103 MMOL/L (ref 99–110)
CHOLESTEROL, TOTAL: 185 MG/DL (ref 0–199)
CO2: 28 MMOL/L (ref 21–32)
CREAT SERPL-MCNC: 1.2 MG/DL (ref 0.8–1.3)
ESTIMATED AVERAGE GLUCOSE: 142.7 MG/DL
GFR AFRICAN AMERICAN: >60
GFR NON-AFRICAN AMERICAN: 60
GLOBULIN: 2.7 G/DL
GLUCOSE BLD-MCNC: 102 MG/DL (ref 70–99)
HBA1C MFR BLD: 6.6 %
HDLC SERPL-MCNC: 49 MG/DL (ref 40–60)
LDL CHOLESTEROL CALCULATED: 111 MG/DL
POTASSIUM SERPL-SCNC: 4.8 MMOL/L (ref 3.5–5.1)
PROSTATE SPECIFIC ANTIGEN: 2.08 NG/ML (ref 0–4)
SODIUM BLD-SCNC: 139 MMOL/L (ref 136–145)
TOTAL PROTEIN: 7.1 G/DL (ref 6.4–8.2)
TRIGL SERPL-MCNC: 124 MG/DL (ref 0–150)
VLDLC SERPL CALC-MCNC: 25 MG/DL

## 2020-09-04 ENCOUNTER — HOSPITAL ENCOUNTER (OUTPATIENT)
Dept: VASCULAR LAB | Age: 70
Discharge: HOME | End: 2020-09-04
Payer: MEDICARE

## 2020-09-04 PROCEDURE — 93925 LOWER EXTREMITY STUDY: CPT

## 2020-09-11 ENCOUNTER — TELEPHONE (OUTPATIENT)
Dept: FAMILY MEDICINE CLINIC | Age: 70
End: 2020-09-11

## 2020-10-05 RX ORDER — METHIMAZOLE 5 MG/1
TABLET ORAL
Qty: 30 TABLET | Refills: 0 | Status: SHIPPED | OUTPATIENT
Start: 2020-10-05 | End: 2020-11-02 | Stop reason: SDUPTHER

## 2020-10-05 NOTE — TELEPHONE ENCOUNTER
Medication:   Requested Prescriptions     Pending Prescriptions Disp Refills    methIMAzole (TAPAZOLE) 5 MG tablet [Pharmacy Med Name: methIMAzole 5 MG Oral Tablet] 30 tablet 0     Sig: Take 1 tablet by mouth once daily         Last appt: 06/05/2019  Next appt: Visit date not found    Last Thyroid:   Lab Results   Component Value Date    TSH 1.84 08/26/2020    FT3 2.7 08/26/2020    T4FREE 1.0 08/26/2020

## 2020-11-02 RX ORDER — METHIMAZOLE 5 MG/1
TABLET ORAL
Qty: 30 TABLET | Refills: 0 | Status: SHIPPED | OUTPATIENT
Start: 2020-11-02 | End: 2020-12-15

## 2020-11-25 RX ORDER — SERTRALINE HYDROCHLORIDE 100 MG/1
TABLET, FILM COATED ORAL
Qty: 90 TABLET | Refills: 0 | Status: SHIPPED | OUTPATIENT
Start: 2020-11-25 | End: 2021-03-16 | Stop reason: SDUPTHER

## 2020-11-25 RX ORDER — MOEXIPRIL HCL 15 MG
TABLET ORAL
Qty: 90 TABLET | Refills: 0 | Status: SHIPPED | OUTPATIENT
Start: 2020-11-25 | End: 2021-03-16 | Stop reason: SDUPTHER

## 2020-11-25 RX ORDER — ATORVASTATIN CALCIUM 80 MG/1
TABLET, FILM COATED ORAL
Qty: 90 TABLET | Refills: 0 | Status: SHIPPED | OUTPATIENT
Start: 2020-11-25 | End: 2020-12-01

## 2020-11-25 NOTE — TELEPHONE ENCOUNTER
.  Last office visit 8/26/2020     Last written 8- 90 with 0      Next office visit scheduled Visit date not found    Requested Prescriptions     Pending Prescriptions Disp Refills    moexipril (UNIVASC) 15 MG tablet [Pharmacy Med Name: Moexipril HCl 15 MG Oral Tablet] 90 tablet 0     Sig: Take 1 tablet by mouth once daily    sertraline (ZOLOFT) 100 MG tablet [Pharmacy Med Name: Sertraline HCl 100 MG Oral Tablet] 90 tablet 0     Sig: Take 1 tablet by mouth once daily    atorvastatin (LIPITOR) 80 MG tablet [Pharmacy Med Name: Atorvastatin Calcium 80 MG Oral Tablet] 90 tablet 0     Sig: Take 1 tablet by mouth once daily

## 2020-12-15 NOTE — TELEPHONE ENCOUNTER
Medication:   Requested Prescriptions     Pending Prescriptions Disp Refills    methIMAzole (TAPAZOLE) 5 MG tablet [Pharmacy Med Name: methIMAzole 5 MG Oral Tablet] 30 tablet 0     Sig: Take 1 tablet by mouth once daily         Last appt:   Next appt: Visit date not found    Last Thyroid:   Lab Results   Component Value Date    TSH 1.84 08/26/2020    FT3 2.7 08/26/2020    T4FREE 1.0 08/26/2020

## 2020-12-16 RX ORDER — METHIMAZOLE 5 MG/1
TABLET ORAL
Qty: 30 TABLET | Refills: 0 | Status: SHIPPED | OUTPATIENT
Start: 2020-12-16 | End: 2021-01-20 | Stop reason: SDUPTHER

## 2021-01-15 DIAGNOSIS — E05.90 HYPERTHYROIDISM: ICD-10-CM

## 2021-01-15 RX ORDER — METHIMAZOLE 5 MG/1
TABLET ORAL
Qty: 30 TABLET | Refills: 0 | OUTPATIENT
Start: 2021-01-15

## 2021-01-15 NOTE — TELEPHONE ENCOUNTER
Medication:   Requested Prescriptions     Pending Prescriptions Disp Refills    methIMAzole (TAPAZOLE) 5 MG tablet [Pharmacy Med Name: methIMAzole 5 MG Oral Tablet] 30 tablet 0     Sig: TAKE 1 TABLET BY MOUTH ONCE DAILY PT  NEEDS  APPOINTMENT         Last appt:   Next appt: Visit date not found    Last Thyroid:   Lab Results   Component Value Date    TSH 1.84 08/26/2020    FT3 2.7 08/26/2020    T4FREE 1.0 08/26/2020

## 2021-01-18 DIAGNOSIS — E05.90 HYPERTHYROIDISM: ICD-10-CM

## 2021-01-18 RX ORDER — METHIMAZOLE 5 MG/1
TABLET ORAL
Qty: 30 TABLET | Refills: 0 | OUTPATIENT
Start: 2021-01-18

## 2021-01-20 DIAGNOSIS — E05.90 HYPERTHYROIDISM: ICD-10-CM

## 2021-01-20 DIAGNOSIS — E05.90 HYPERTHYROIDISM: Primary | ICD-10-CM

## 2021-01-20 RX ORDER — METHIMAZOLE 5 MG/1
TABLET ORAL
Qty: 30 TABLET | Refills: 0 | Status: SHIPPED | OUTPATIENT
Start: 2021-01-20 | End: 2021-02-21 | Stop reason: SDUPTHER

## 2021-01-21 RX ORDER — METHIMAZOLE 5 MG/1
TABLET ORAL
Qty: 30 TABLET | Refills: 0 | OUTPATIENT
Start: 2021-01-21

## 2021-02-21 DIAGNOSIS — E05.90 HYPERTHYROIDISM: ICD-10-CM

## 2021-02-22 RX ORDER — METHIMAZOLE 5 MG/1
TABLET ORAL
Qty: 30 TABLET | Refills: 0 | Status: SHIPPED | OUTPATIENT
Start: 2021-02-22 | End: 2021-03-30 | Stop reason: SDUPTHER

## 2021-02-25 ENCOUNTER — IMMUNIZATION (OUTPATIENT)
Dept: PRIMARY CARE CLINIC | Age: 71
End: 2021-02-25
Payer: MEDICARE

## 2021-02-25 PROCEDURE — 91300 COVID-19, PFIZER VACCINE 30MCG/0.3ML DOSE: CPT | Performed by: FAMILY MEDICINE

## 2021-02-25 PROCEDURE — 0001A COVID-19, PFIZER VACCINE 30MCG/0.3ML DOSE: CPT | Performed by: FAMILY MEDICINE

## 2021-03-16 RX ORDER — SERTRALINE HYDROCHLORIDE 100 MG/1
TABLET, FILM COATED ORAL
Qty: 90 TABLET | Refills: 0 | Status: SHIPPED | OUTPATIENT
Start: 2021-03-16 | End: 2021-06-09 | Stop reason: SDUPTHER

## 2021-03-16 RX ORDER — MOEXIPRIL HCL 15 MG
TABLET ORAL
Qty: 90 TABLET | Refills: 0 | Status: SHIPPED | OUTPATIENT
Start: 2021-03-16 | End: 2021-06-09 | Stop reason: SDUPTHER

## 2021-03-16 NOTE — TELEPHONE ENCOUNTER
Refill Request - Controlled Substance    Last Seen: 8/26/2020    Last Written: 11/25/2020    Last UDS:     Med Agreement Signed On:     Next Appointment: 3/16/2021        Requested Prescriptions     Pending Prescriptions Disp Refills    moexipril (UNIVASC) 15 MG tablet 90 tablet 0    sertraline (ZOLOFT) 100 MG tablet 90 tablet 0     Sig: Take 1 tablet by mouth once daily

## 2021-03-18 ENCOUNTER — IMMUNIZATION (OUTPATIENT)
Dept: PRIMARY CARE CLINIC | Age: 71
End: 2021-03-18
Payer: MEDICARE

## 2021-03-18 PROCEDURE — 0002A COVID-19, PFIZER VACCINE 30MCG/0.3ML DOSE: CPT | Performed by: FAMILY MEDICINE

## 2021-03-18 PROCEDURE — 91300 COVID-19, PFIZER VACCINE 30MCG/0.3ML DOSE: CPT | Performed by: FAMILY MEDICINE

## 2021-03-30 DIAGNOSIS — E05.90 HYPERTHYROIDISM: ICD-10-CM

## 2021-03-30 RX ORDER — METHIMAZOLE 5 MG/1
TABLET ORAL
Qty: 30 TABLET | Refills: 0 | Status: SHIPPED | OUTPATIENT
Start: 2021-03-30 | End: 2021-05-07 | Stop reason: SDUPTHER

## 2021-05-07 DIAGNOSIS — E05.90 HYPERTHYROIDISM: ICD-10-CM

## 2021-05-07 RX ORDER — METHIMAZOLE 5 MG/1
TABLET ORAL
Qty: 30 TABLET | Refills: 0 | Status: SHIPPED | OUTPATIENT
Start: 2021-05-07 | End: 2021-06-09 | Stop reason: SDUPTHER

## 2021-06-09 DIAGNOSIS — E05.90 HYPERTHYROIDISM: ICD-10-CM

## 2021-06-10 DIAGNOSIS — E05.90 HYPERTHYROIDISM: Primary | ICD-10-CM

## 2021-06-10 RX ORDER — METHIMAZOLE 5 MG/1
TABLET ORAL
Qty: 30 TABLET | Refills: 0 | Status: SHIPPED | OUTPATIENT
Start: 2021-06-10 | End: 2021-07-14 | Stop reason: SDUPTHER

## 2021-06-10 NOTE — TELEPHONE ENCOUNTER
Refill Request     Last Seen: Last Seen Department: 8/26/2020  Last Seen by PCP: 8/26/2020    Last Written: 4/6/2021 for #60 with 3 refills    Next Appointment:   Future Appointments   Date Time Provider Yehuda Rodriguez   8/26/2021 10:30 AM DO KERRIE Villa  [place] [facility]       Future appointment scheduled         Requested Prescriptions     Pending Prescriptions Disp Refills    fluticasone-salmeterol (ADVAIR DISKUS) 250-50 MCG/DOSE AEPB 60 each 3     Sig: Inhale 1 puff into the lungs every 12 hours

## 2021-06-17 DIAGNOSIS — I10 ESSENTIAL HYPERTENSION: Primary | ICD-10-CM

## 2021-06-17 DIAGNOSIS — E05.90 HYPERTHYROIDISM: ICD-10-CM

## 2021-06-22 DIAGNOSIS — E78.2 MIXED HYPERLIPIDEMIA: ICD-10-CM

## 2021-06-22 RX ORDER — ATORVASTATIN CALCIUM 80 MG/1
TABLET, FILM COATED ORAL
Qty: 90 TABLET | Refills: 3 | Status: SHIPPED | OUTPATIENT
Start: 2021-06-22 | End: 2022-01-15 | Stop reason: SDUPTHER

## 2021-06-22 RX ORDER — MOEXIPRIL HCL 15 MG
TABLET ORAL
Qty: 90 TABLET | Refills: 3 | Status: SHIPPED | OUTPATIENT
Start: 2021-06-22 | End: 2021-10-18

## 2021-07-14 DIAGNOSIS — E05.90 HYPERTHYROIDISM: ICD-10-CM

## 2021-07-15 RX ORDER — METHIMAZOLE 5 MG/1
TABLET ORAL
Qty: 30 TABLET | Refills: 0 | Status: SHIPPED | OUTPATIENT
Start: 2021-07-15 | End: 2021-08-16 | Stop reason: SDUPTHER

## 2021-08-16 DIAGNOSIS — E05.90 HYPERTHYROIDISM: ICD-10-CM

## 2021-08-16 RX ORDER — METHIMAZOLE 5 MG/1
TABLET ORAL
Qty: 30 TABLET | Refills: 0 | Status: SHIPPED | OUTPATIENT
Start: 2021-08-16 | End: 2021-09-17 | Stop reason: SDUPTHER

## 2021-08-26 ENCOUNTER — OFFICE VISIT (OUTPATIENT)
Dept: FAMILY MEDICINE CLINIC | Age: 71
End: 2021-08-26
Payer: MEDICARE

## 2021-08-26 VITALS
WEIGHT: 197.4 LBS | HEIGHT: 68 IN | OXYGEN SATURATION: 97 % | SYSTOLIC BLOOD PRESSURE: 130 MMHG | HEART RATE: 80 BPM | DIASTOLIC BLOOD PRESSURE: 80 MMHG | BODY MASS INDEX: 29.92 KG/M2

## 2021-08-26 DIAGNOSIS — F41.9 ANXIETY: ICD-10-CM

## 2021-08-26 DIAGNOSIS — Z12.5 SPECIAL SCREENING FOR MALIGNANT NEOPLASM OF PROSTATE: ICD-10-CM

## 2021-08-26 DIAGNOSIS — R73.9 HYPERGLYCEMIA: ICD-10-CM

## 2021-08-26 DIAGNOSIS — I10 ESSENTIAL HYPERTENSION: Primary | ICD-10-CM

## 2021-08-26 DIAGNOSIS — E53.8 VITAMIN B 12 DEFICIENCY: ICD-10-CM

## 2021-08-26 DIAGNOSIS — I25.10 CORONARY ARTERY DISEASE INVOLVING NATIVE CORONARY ARTERY OF NATIVE HEART WITHOUT ANGINA PECTORIS: ICD-10-CM

## 2021-08-26 DIAGNOSIS — E05.90 HYPERTHYROIDISM: ICD-10-CM

## 2021-08-26 DIAGNOSIS — J43.9 PULMONARY EMPHYSEMA, UNSPECIFIED EMPHYSEMA TYPE (HCC): ICD-10-CM

## 2021-08-26 DIAGNOSIS — E55.9 VITAMIN D DEFICIENCY: ICD-10-CM

## 2021-08-26 DIAGNOSIS — I73.9 CLAUDICATION (HCC): ICD-10-CM

## 2021-08-26 LAB
A/G RATIO: 1.6 (ref 1.1–2.2)
ALBUMIN SERPL-MCNC: 4.2 G/DL (ref 3.4–5)
ALP BLD-CCNC: 97 U/L (ref 40–129)
ALT SERPL-CCNC: 16 U/L (ref 10–40)
ANION GAP SERPL CALCULATED.3IONS-SCNC: 14 MMOL/L (ref 3–16)
AST SERPL-CCNC: 17 U/L (ref 15–37)
BASOPHILS ABSOLUTE: 0 K/UL (ref 0–0.2)
BASOPHILS RELATIVE PERCENT: 0.8 %
BILIRUB SERPL-MCNC: 0.4 MG/DL (ref 0–1)
BUN BLDV-MCNC: 18 MG/DL (ref 7–20)
CALCIUM SERPL-MCNC: 9.8 MG/DL (ref 8.3–10.6)
CHLORIDE BLD-SCNC: 102 MMOL/L (ref 99–110)
CO2: 24 MMOL/L (ref 21–32)
CREAT SERPL-MCNC: 1.4 MG/DL (ref 0.8–1.3)
EOSINOPHILS ABSOLUTE: 0.1 K/UL (ref 0–0.6)
EOSINOPHILS RELATIVE PERCENT: 1.4 %
FOLATE: 9.2 NG/ML (ref 4.78–24.2)
GFR AFRICAN AMERICAN: >60
GFR NON-AFRICAN AMERICAN: 50
GLOBULIN: 2.7 G/DL
GLUCOSE BLD-MCNC: 111 MG/DL (ref 70–99)
HCT VFR BLD CALC: 42.8 % (ref 40.5–52.5)
HEMOGLOBIN: 14.2 G/DL (ref 13.5–17.5)
LYMPHOCYTES ABSOLUTE: 1.4 K/UL (ref 1–5.1)
LYMPHOCYTES RELATIVE PERCENT: 27.9 %
MCH RBC QN AUTO: 26.6 PG (ref 26–34)
MCHC RBC AUTO-ENTMCNC: 33.3 G/DL (ref 31–36)
MCV RBC AUTO: 80.1 FL (ref 80–100)
MONOCYTES ABSOLUTE: 1.2 K/UL (ref 0–1.3)
MONOCYTES RELATIVE PERCENT: 23.7 %
NEUTROPHILS ABSOLUTE: 2.4 K/UL (ref 1.7–7.7)
NEUTROPHILS RELATIVE PERCENT: 46.2 %
PDW BLD-RTO: 17.2 % (ref 12.4–15.4)
PLATELET # BLD: 147 K/UL (ref 135–450)
PMV BLD AUTO: 8.4 FL (ref 5–10.5)
POTASSIUM SERPL-SCNC: 5 MMOL/L (ref 3.5–5.1)
PROSTATE SPECIFIC ANTIGEN: 3.01 NG/ML (ref 0–4)
RBC # BLD: 5.34 M/UL (ref 4.2–5.9)
SODIUM BLD-SCNC: 140 MMOL/L (ref 136–145)
TOTAL PROTEIN: 6.9 G/DL (ref 6.4–8.2)
VITAMIN B-12: 314 PG/ML (ref 211–911)
VITAMIN D 25-HYDROXY: 37.1 NG/ML
WBC # BLD: 5.1 K/UL (ref 4–11)

## 2021-08-26 PROCEDURE — 1036F TOBACCO NON-USER: CPT | Performed by: FAMILY MEDICINE

## 2021-08-26 PROCEDURE — 99214 OFFICE O/P EST MOD 30 MIN: CPT | Performed by: FAMILY MEDICINE

## 2021-08-26 PROCEDURE — 1123F ACP DISCUSS/DSCN MKR DOCD: CPT | Performed by: FAMILY MEDICINE

## 2021-08-26 PROCEDURE — 4040F PNEUMOC VAC/ADMIN/RCVD: CPT | Performed by: FAMILY MEDICINE

## 2021-08-26 PROCEDURE — G8427 DOCREV CUR MEDS BY ELIG CLIN: HCPCS | Performed by: FAMILY MEDICINE

## 2021-08-26 PROCEDURE — 3017F COLORECTAL CA SCREEN DOC REV: CPT | Performed by: FAMILY MEDICINE

## 2021-08-26 PROCEDURE — 3023F SPIROM DOC REV: CPT | Performed by: FAMILY MEDICINE

## 2021-08-26 PROCEDURE — G8926 SPIRO NO PERF OR DOC: HCPCS | Performed by: FAMILY MEDICINE

## 2021-08-26 PROCEDURE — G8417 CALC BMI ABV UP PARAM F/U: HCPCS | Performed by: FAMILY MEDICINE

## 2021-08-26 ASSESSMENT — ENCOUNTER SYMPTOMS
CONSTIPATION: 0
SHORTNESS OF BREATH: 1
COUGH: 0
BLOOD IN STOOL: 0
ABDOMINAL PAIN: 0
CHEST TIGHTNESS: 0

## 2021-08-26 NOTE — PROGRESS NOTES
Subjective:      Patient ID: Madhu Villarreal is a 65 y.o. male. HPI  Patient in for yearly checkup on several medical issues. Hypertension-blood pressure 140/80 or below when he checks it at home or elsewhere. 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 Claudication-he states that after walking a certain distance he has discomfort in the left calf which has been investigated before and he does have some arterial circulation problems but apparently nothing was said about needing any type of surgery-then he states that he has the same thing predictable in the top of his buttocks and actually the calves and the buttocks bother him at the same time after that certain distance. COPD-he is on an inhaler and does okay most of the time. Clenton Reas Anxiety-on medication and does pretty well with the anxiety. Coronary artery disease-on medication and does see cardiologist occasionally. He does see endocrinologist once a year for hyperthyroidism. He is due for some lab work today. He has had an A1c as high as 6.6 but is not on any medication. He denies any other issues to discuss. Apparently has bought an A1c monitor for the house and he said he checked it yesterday and it was 5.8. Prior to Visit Medications :  Medication methIMAzole (TAPAZOLE) 5 MG tablet, Sig Take one tab daily, Taking? Yes, Authorizing Provider NEWTON Khan - CNP    Medication atorvastatin (LIPITOR) 80 MG tablet, Sig Take 1 tablet by mouth once daily, Taking? Yes, Authorizing Provider Godfrey Mejia, DO    Medication fluticasone-salmeterol (ADVAIR DISKUS) 250-50 MCG/DOSE AEPB, Sig Inhale 1 puff into the lungs every 12 hours, Taking? Yes, Authorizing Provider Godfrey Mejia, DO    Medication moexipril (UNIVASC) 15 MG tablet, Sig Take 1 tablet by mouth once daily, Taking?  Yes, Authorizing Provider Godfrey Mejia, DO    Medication sertraline (ZOLOFT) 100 MG tablet, Sig Take 1 tablet by mouth once daily, Taking? Yes, Authorizing Provider Godfrey Mejia, DO    Medication metoprolol (TOPROL-XL) 25 MG XL tablet, Sig Take 25 mg by mouth daily. , Taking? Yes, Authorizing Provider Historical Provider, MD    Medication aspirin 81 MG tablet, Sig Take 81 mg by mouth daily. otc, Taking? Yes, Authorizing Provider Historical Provider, MD      Past Medical History:  No date: CAD (coronary artery disease)  No date: Depression      Comment:  ADJUSTMENT DISORDER  10/10: Heart attack ([facility] [address]  No date: History of renal calculi  No date: Hyperlipidemia  No date: Hypertension  No date: Hyperthyroidism        Review of Systems    Review of Systems   Constitutional: Negative for fever and unexpected weight change. HENT: Negative for congestion and postnasal drip. Eyes: Negative for visual disturbance. Respiratory: Positive for shortness of breath. Negative for cough and chest tightness. Cardiovascular: Negative for chest pain, palpitations and leg swelling. See HPI for claudication. Gastrointestinal: Negative for abdominal pain, blood in stool and constipation. Genitourinary: Positive for frequency. Negative for dysuria and hematuria. Musculoskeletal: Negative for arthralgias and myalgias. Skin: Negative for rash. Neurological: Negative for tremors and headaches. Psychiatric/Behavioral: Negative for sleep disturbance. The patient is nervous/anxious. Objective:   Physical Exam      Physical Exam  Constitutional:       General: He is not in acute distress. Appearance: Normal appearance. He is well-developed. He is not ill-appearing. HENT:      Head: Normocephalic. Mouth/Throat:      Mouth: Mucous membranes are moist.      Pharynx: Oropharynx is clear. Eyes:      Conjunctiva/sclera: Conjunctivae normal.   Neck:      Thyroid: No thyromegaly. Vascular: No carotid bruit. Cardiovascular:      Rate and Rhythm: Normal rate and regular rhythm.       Heart sounds: Normal heart sounds. Pulmonary:      Effort: Pulmonary effort is normal.      Comments: Mild decrease in breath sounds but clear  Abdominal:      General: There is no distension. Palpations: Abdomen is soft. There is no mass. Tenderness: There is no abdominal tenderness. Musculoskeletal:         General: Normal range of motion. Cervical back: Neck supple. Right lower leg: No edema. Left lower leg: No edema. Lymphadenopathy:      Cervical: No cervical adenopathy. Skin:     General: Skin is warm and dry. Neurological:      Mental Status: He is alert and oriented to person, place, and time. Gait: Gait normal.   Psychiatric:         Mood and Affect: Mood normal.         Behavior: Behavior normal.         Thought Content: Thought content normal.         Judgment: Judgment normal.         Assessment:       Diagnosis Orders   1. Essential hypertension  CBC Auto Differential    Comprehensive Metabolic Panel    CREATININE, SERUM   2. Pulmonary emphysema, unspecified emphysema type (HCC)     3. Claudication (HCC)  CTA ABDOMEN PELVIS W CONTRAST   4. Anxiety     5. Coronary artery disease involving native coronary artery of native heart without angina pectoris     6. Hyperthyroidism     7. Special screening for malignant neoplasm of prostate  PSA screening   8. Vitamin D deficiency  Vitamin D 25 Hydroxy   9. Vitamin B 12 deficiency  Vitamin B12 & Folate   10. Hyperglycemia  Hemoglobin A1C         Plan:      Sobeida Coon was seen today for 1 year follow up. Diagnoses and all orders for this visit:    Essential hypertension  -     CBC Auto Differential  -     Comprehensive Metabolic Panel  -     CREATININE, SERUM; Future  Lab todaycontinue medications and no added salt dietlimit caffeine and preferably no alcohol. Notify me of any persistent elevation of blood pressure. Work on dropping a few pounds by reducing carbs and increasing activity.   Pulmonary emphysema, unspecified emphysema type ([address]  Continue inhalers and visits with the pulmonologist if you are still seeing him. Notify me of any increase in shortness of breath. Claudication [facility])  -     [address]va Costello [place] [address]; Future  Calf and buttocks claudicationrefer for CTA of the abdomen pelvis for evaluation. Anxiety  Continue medications and stay as active as possible. Notify me of any increase in anxiety. Coronary artery disease involving native coronary artery of native heart without angina pectoris  Continue medications and visit with the cardiologist when scheduled. Work on getting weight down by reducing carbs and increasing activity. Hyperthyroidism  Continue visits yearly with endocrinology department and blood work to be done today. Special screening for malignant neoplasm of prostate  -     PSA screening  Lab today  Vitamin D deficiency  -     Vitamin D 25 Hydroxy  Lab today  Vitamin B 12 deficiency  -     Vitamin B12 & Folate  Lab today  Hyperglycemia  -     Hemoglobin A1C  Lab today    This is been approximately 30-minute visit with the patient. Prior to and during the visit chart was reviewed for immunizations colonoscopy lab work, vascular studies of the lower extremity. See me 1 year but we will be in contact with results of the labs in the procedures it will be done.      Godfrey Mejia, DO

## 2021-08-26 NOTE — PATIENT INSTRUCTIONS
Essential hypertension  -     CBC Auto Differential  -     Comprehensive Metabolic Panel  -     CREATININE, SERUM; Future  Lab todaycontinue medications and no added salt dietlimit caffeine and preferably no alcohol. Notify me of any persistent elevation of blood pressure. Work on dropping a few pounds by reducing carbs and increasing activity. Pulmonary emphysema, unspecified emphysema type ([address]  Continue inhalers and visits with the pulmonologist if you are still seeing him. Notify me of any increase in shortness of breath. Claudication [facility])  -     [address]va Costello [place] [address]; Future  Calf and buttocks claudicationrefer for CTA of the abdomen pelvis for evaluation. Anxiety  Continue medications and stay as active as possible. Notify me of any increase in anxiety. Coronary artery disease involving native coronary artery of native heart without angina pectoris  Continue medications and visit with the cardiologist when scheduled. Work on getting weight down by reducing carbs and increasing activity. Hyperthyroidism  Continue visits yearly with endocrinology department and blood work to be done today. Special screening for malignant neoplasm of prostate  -     PSA screening  Lab today  Vitamin D deficiency  -     Vitamin D 25 Hydroxy  Lab today  Vitamin B 12 deficiency  -     Vitamin B12 & Folate  Lab today  Hyperglycemia  -     Hemoglobin A1C  Lab today    This is been approximately 30-minute visit with the patient. Prior to and during the visit chart was reviewed for immunizations colonoscopy lab work, vascular studies of the lower extremity. See me 1 year but we will be in contact with results of the labs in the procedures it will be done.      Godfrey Mejia, DO

## 2021-08-27 LAB
ESTIMATED AVERAGE GLUCOSE: 139.9 MG/DL
HBA1C MFR BLD: 6.5 %

## 2021-08-30 DIAGNOSIS — E05.90 HYPERTHYROIDISM: Primary | ICD-10-CM

## 2021-09-01 ENCOUNTER — HOSPITAL ENCOUNTER (OUTPATIENT)
Dept: CT IMAGING | Age: 71
Discharge: HOME | End: 2021-09-01
Payer: MEDICARE

## 2021-09-01 DIAGNOSIS — I73.9 CLAUDICATION (HCC): ICD-10-CM

## 2021-09-01 PROCEDURE — 6360000004 HC RX CONTRAST MEDICATION: Performed by: FAMILY MEDICINE

## 2021-09-01 PROCEDURE — 74174 CTA ABD&PLVS W/CONTRAST: CPT

## 2021-09-01 RX ADMIN — IOPAMIDOL 75 ML: 755 INJECTION, SOLUTION INTRAVENOUS at 09:08

## 2021-09-02 ENCOUNTER — TELEPHONE (OUTPATIENT)
Dept: FAMILY MEDICINE CLINIC | Age: 71
End: 2021-09-02

## 2021-09-02 DIAGNOSIS — N32.89 BLADDER MASS: Primary | ICD-10-CM

## 2021-09-02 DIAGNOSIS — I73.9 CLAUDICATION (HCC): ICD-10-CM

## 2021-09-02 NOTE — TELEPHONE ENCOUNTER
DR. Alis Langford is not in the office today. Bladder mass is seen, concerning for bladder cancer. Referral placed to Urology below. Please schedule ASAP. Severe atherosclerotic disease seen in left iliac artery. May need vascular consult in the future. I will go ahead and place referral as well in referrals. 1. Bilateral common iliac artery atherosclerotic disease with severe left and   moderate right stenosis. 2. Enhancing mass within the bladder is nonspecific but concerning for   malignancy.  Cystoscopic evaluation is suggested.    3. It is noted that the lower extremity arterial vessels were not included on   this exam.     Dr. Aleah Nuñez  [facility]  [address] [place] [address] [address] [place] [address]  Phone: [phone]  Fax: [phone]

## 2021-09-07 ENCOUNTER — TELEPHONE (OUTPATIENT)
Dept: FAMILY MEDICINE CLINIC | Age: 71
End: 2021-09-07

## 2021-09-07 NOTE — TELEPHONE ENCOUNTER
Pt states was called with CT results and was referred to urology and vascular.  He has an appointment with urology 9-13-21 and is wanting to know if he can wait on the vascular referral and take care of the urology issues first

## 2021-09-13 ENCOUNTER — TELEPHONE (OUTPATIENT)
Dept: VASCULAR SURGERY | Age: 71
End: 2021-09-13

## 2021-09-17 DIAGNOSIS — E05.90 HYPERTHYROIDISM: ICD-10-CM

## 2021-09-18 DIAGNOSIS — E05.90 HYPERTHYROIDISM: ICD-10-CM

## 2021-09-20 RX ORDER — METHIMAZOLE 5 MG/1
TABLET ORAL
Qty: 30 TABLET | Refills: 0 | Status: SHIPPED | OUTPATIENT
Start: 2021-09-20 | End: 2021-10-25 | Stop reason: SDUPTHER

## 2021-09-20 RX ORDER — METHIMAZOLE 5 MG/1
TABLET ORAL
Qty: 30 TABLET | Refills: 0 | OUTPATIENT
Start: 2021-09-20 | End: 2022-03-15

## 2021-09-23 ENCOUNTER — TELEPHONE (OUTPATIENT)
Dept: FAMILY MEDICINE CLINIC | Age: 71
End: 2021-09-23

## 2021-09-23 NOTE — TELEPHONE ENCOUNTER
----- Message from Alejandra Barton sent at 9/23/2021 12:54 PM EDT -----  Subject: Appointment Request    Reason for Call: Routine Pre-Op    QUESTIONS  Type of Appointment? Established Patient  Reason for appointment request? No appointments available during search  Additional Information for Provider? Patient needs to reschedule his appt   for today at 2:00 his [place]. He can not   make it in time. He needs this a pre op and ekg before 9-30-21 for his   surgery. Please call patient to reschedule. Canceled appt for today  ---------------------------------------------------------------------------  --------------  CALL BACK INFO  What is the best way for the office to contact you? OK to leave message on   voicemail  Preferred Call Back Phone Number? [phone]  ---------------------------------------------------------------------------  --------------  SCRIPT ANSWERS  Relationship to Patient? Self  Have your symptoms changed? No  Do you have questions for your provider that need to be answered prior to   scheduling your pre-op appointment? No  Have you been diagnosed with, awaiting test results for, or told that you   are suspected of having COVID-19 (Coronavirus)? (If patient has tested   negative or was tested as a requirement for work, school, or travel and   not based on symptoms, answer no)? No  Within the past two weeks have you developed any of the following symptoms   (answer no if symptoms have been present longer than 2 weeks or began   more than 2 weeks ago)? Fever or Chills, Cough, Shortness of breath or   difficulty breathing, Loss of taste or smell, Sore throat, Nasal   congestion, Sneezing or runny nose, Fatigue or generalized body aches   (answer no if pain is specific to a body part e.g. back pain), Diarrhea,   Headache? No  Have you had close contact with someone with COVID-19 in the last 14 days?    No  (Service Expert  click yes below to proceed with [facility] [employer] As Usual   Scheduling)?  Yes

## 2021-09-27 ENCOUNTER — OFFICE VISIT (OUTPATIENT)
Dept: FAMILY MEDICINE CLINIC | Age: 71
End: 2021-09-27
Payer: MEDICARE

## 2021-09-27 VITALS
WEIGHT: 198.6 LBS | DIASTOLIC BLOOD PRESSURE: 62 MMHG | OXYGEN SATURATION: 98 % | HEIGHT: 68 IN | HEART RATE: 75 BPM | BODY MASS INDEX: 30.1 KG/M2 | SYSTOLIC BLOOD PRESSURE: 158 MMHG

## 2021-09-27 DIAGNOSIS — E78.2 MIXED HYPERLIPIDEMIA: ICD-10-CM

## 2021-09-27 DIAGNOSIS — N32.89 BLADDER MASS: ICD-10-CM

## 2021-09-27 DIAGNOSIS — Z01.818 PRE-OP EVALUATION: Primary | ICD-10-CM

## 2021-09-27 PROCEDURE — 1123F ACP DISCUSS/DSCN MKR DOCD: CPT | Performed by: PHYSICIAN ASSISTANT

## 2021-09-27 PROCEDURE — G8427 DOCREV CUR MEDS BY ELIG CLIN: HCPCS | Performed by: PHYSICIAN ASSISTANT

## 2021-09-27 PROCEDURE — 4040F PNEUMOC VAC/ADMIN/RCVD: CPT | Performed by: PHYSICIAN ASSISTANT

## 2021-09-27 PROCEDURE — 93000 ELECTROCARDIOGRAM COMPLETE: CPT | Performed by: PHYSICIAN ASSISTANT

## 2021-09-27 PROCEDURE — 99214 OFFICE O/P EST MOD 30 MIN: CPT | Performed by: PHYSICIAN ASSISTANT

## 2021-09-27 PROCEDURE — G8417 CALC BMI ABV UP PARAM F/U: HCPCS | Performed by: PHYSICIAN ASSISTANT

## 2021-09-27 PROCEDURE — 3017F COLORECTAL CA SCREEN DOC REV: CPT | Performed by: PHYSICIAN ASSISTANT

## 2021-09-27 PROCEDURE — 1036F TOBACCO NON-USER: CPT | Performed by: PHYSICIAN ASSISTANT

## 2021-09-27 SDOH — ECONOMIC STABILITY: TRANSPORTATION INSECURITY
IN THE PAST 12 MONTHS, HAS THE LACK OF TRANSPORTATION KEPT YOU FROM MEDICAL APPOINTMENTS OR FROM GETTING MEDICATIONS?: NO

## 2021-09-27 SDOH — ECONOMIC STABILITY: TRANSPORTATION INSECURITY
IN THE PAST 12 MONTHS, HAS LACK OF TRANSPORTATION KEPT YOU FROM MEETINGS, WORK, OR FROM GETTING THINGS NEEDED FOR DAILY LIVING?: NO

## 2021-09-27 SDOH — ECONOMIC STABILITY: FOOD INSECURITY: WITHIN THE PAST 12 MONTHS, THE FOOD YOU BOUGHT JUST DIDN'T LAST AND YOU DIDN'T HAVE MONEY TO GET MORE.: NEVER TRUE

## 2021-09-27 SDOH — ECONOMIC STABILITY: FOOD INSECURITY: WITHIN THE PAST 12 MONTHS, YOU WORRIED THAT YOUR FOOD WOULD RUN OUT BEFORE YOU GOT MONEY TO BUY MORE.: NEVER TRUE

## 2021-09-27 ASSESSMENT — SOCIAL DETERMINANTS OF HEALTH (SDOH): HOW HARD IS IT FOR YOU TO PAY FOR THE VERY BASICS LIKE FOOD, HOUSING, MEDICAL CARE, AND HEATING?: NOT HARD AT ALL

## 2021-09-27 NOTE — PROGRESS NOTES
Preoperative Consultation      Yolanda Ware  YOB: 1950    Date of Service:  9/27/2021    Vitals:    09/27/21 0826   BP: (!) 150/62   Site: Right Upper Arm   Position: Sitting   Cuff Size: Medium Adult   Pulse: 75   SpO2: 98%   Weight: 198 lb 9.6 oz (90.1 kg)   Height: 5' 8\" (1.727 m)      Wt Readings from Last 2 Encounters:   09/27/21 198 lb 9.6 oz (90.1 kg)   08/26/21 197 lb 6.4 oz (89.5 kg)     BP Readings from Last 3 Encounters:   09/27/21 (!) 150/62   08/26/21 130/80   08/26/20 (!) 142/70        Chief Complaint   Patient presents with   [facility] Pre-op Exam     tumor removal from bladder being done with  at [facility] [place] on 9/30/21     Allergies   Allergen Reactions    Penicillins      Outpatient Medications Marked as Taking for the 9/27/21 encounter (Office Visit) with CHRIS Ballard   Medication Sig Dispense Refill    methIMAzole (TAPAZOLE) 5 MG tablet Take one tab daily 30 tablet 0    atorvastatin (LIPITOR) 80 MG tablet Take 1 tablet by mouth once daily 90 tablet 3    fluticasone-salmeterol (ADVAIR DISKUS) 250-50 MCG/DOSE AEPB Inhale 1 puff into the lungs every 12 hours 180 each 3    moexipril (UNIVASC) 15 MG tablet Take 1 tablet by mouth once daily 90 tablet 3    sertraline (ZOLOFT) 100 MG tablet Take 1 tablet by mouth once daily 90 tablet 0    metoprolol (TOPROL-XL) 25 MG XL tablet Take 25 mg by mouth daily.  aspirin 81 MG tablet Take 81 mg by mouth daily. otc         This patient presents to the office today for a preoperative consultation at the request of surgeon, Dr. Ben Aguilar, who plans on performing TURBT on September 30 at [facility] [place]. The current problem began 2 week ago, and symptoms have been unchanged with time.   Conservative therapy: No.    Planned anesthesia: General   Known anesthesia problems: None   Bleeding risk: No recent or remote history of abnormal bleeding  Personal or FH of DVT/PE: No    Patient objection to receiving blood products: No    Patient Active Problem List   Diagnosis    Hyperlipidemia    Depression    Hypertension    CAD (coronary artery disease)    History of renal calculi    Tobacco dependence    Vitamin D deficiency    Esophageal thickening    Hyperthyroidism    Elevated glucose    Thyroid nodule    Prediabetes    Pulmonary emphysema, unspecified emphysema type ([facility] [address]    [facility]       Past Medical History:   Diagnosis Date    CAD (coronary artery disease)     Depression     ADJUSTMENT DISORDER    Heart attack ([facility] [address] 10/10    History of renal calculi     Hyperlipidemia     Hypertension     Hyperthyroidism      Past Surgical History:   Procedure Laterality Date    CARDIAC SURGERY  2010 stents    COLONOSCOPY  14    colonoscopy and egd     Family History   Problem Relation Age of Onset    Hearing Loss Mother     Heart Disease Mother     High Blood Pressure Mother     High Cholesterol Mother     Heart Disease Father     High Blood Pressure Father     High Cholesterol Father     Arthritis Sister     High Cholesterol Sister     Stroke Maternal Aunt      Social History     Socioeconomic History    Marital status:      Spouse name: Not on file    Number of children: Not on file    Years of education: Not on file    Highest education level: Not on file   Occupational History    Not on file   Tobacco Use    Smoking status: Former Smoker     Packs/day: 1.00     Years: 20.00     Pack years: 20.00     Types: Cigarettes     Quit date: 2019     Years since quittin.6    Smokeless tobacco: Never Used   Vaping Use    Vaping Use: Never assessed   Substance and Sexual Activity    Alcohol use: No    Drug use: No    Sexual activity: Not on file   Other Topics Concern    Not on file   Social History Narrative    Not on file     Social Determinants of Health     Financial Resource Strain: Low Risk     Difficulty of Paying Living Expenses: Not hard at all   Food Insecurity: No Food Insecurity    Worried About Running Out of Food in the Last Year: Never true    Ran Out of Food in the Last Year: Never true   Transportation Needs: No Transportation Needs    Lack of Transportation (Medical): No    Lack of Transportation (Non-Medical): No   Physical Activity:     Days of Exercise per Week:     Minutes of Exercise per Session:    Stress:     Feeling of Stress :    Social Connections:     Frequency of Communication with Friends and Family:     Frequency of Social Gatherings with Friends and Family:     Attends Rastafarian Services:     Active Member of Clubs or Organizations:     Attends Club or Organization Meetings:     Marital Status:    Intimate Partner Violence:     Fear of Current or Ex-Partner:     Emotionally Abused:     Physically Abused:     Sexually Abused:        Review of Systems  A comprehensive review of systems was negative except for what was noted in the HPI. Physical Exam   Constitutional: He is oriented to person, place, and time. He appears well-developed and well-nourished. No distress. HENT:   Head: Normocephalic and atraumatic. Mouth/Throat: Uvula is midline, oropharynx is clear and moist and mucous membranes are normal.   Eyes: Conjunctivae and EOM are normal. Pupils are equal, round, and reactive to light. Neck: Trachea normal and normal range of motion. Neck supple. No JVD present. Carotid bruit is not present. No mass and no thyromegaly present. Cardiovascular: Normal rate, regular rhythm, normal heart sounds and intact distal pulses. Exam reveals no gallop and no friction rub. No murmur heard. Pulmonary/Chest: Effort normal and breath sounds normal. No respiratory distress. He has no wheezes. He has no rales. Abdominal: Soft. Normal aorta and bowel sounds are normal. He exhibits no distension and no mass. There is no hepatosplenomegaly. No tenderness. Musculoskeletal: He exhibits no edema and no tenderness. Neurological: He is alert and oriented to person, place, and time. He has normal strength. No cranial nerve deficit or sensory deficit. Coordination and gait normal.   Skin: Skin is warm and dry. No rash noted. No erythema. Psychiatric: He has a normal mood and affect.  His behavior is normal.     EKG Interpretation:  RBBB, unchanged from previous tracings-2014 ecg    Lab Review   Orders Only on 08/26/2021   Component Date Value    CREATININE 08/26/2021 1.4*    GFR Non- 08/26/2021 50*    GFR  08/26/2021 >60    Orders Only on 08/26/2021   Component Date Value    TSH 08/26/2021 2.55     T4 Free 08/26/2021 0.8*    T3, Free 08/26/2021 3.3    Office Visit on 08/26/2021   Component Date Value    WBC 08/26/2021 5.1     RBC 08/26/2021 5.34     Hemoglobin 08/26/2021 14.2     Hematocrit 08/26/2021 42.8     MCV 08/26/2021 80.1     MCH 08/26/2021 26.6     MCHC 08/26/2021 33.3     RDW 08/26/2021 17.2*    Platelets 22/18/3141 147     MPV 08/26/2021 8.4     Neutrophils % 08/26/2021 46.2     Lymphocytes % 08/26/2021 27.9     Monocytes % 08/26/2021 23.7     Eosinophils % 08/26/2021 1.4     Basophils % 08/26/2021 0.8     Neutrophils Absolute 08/26/2021 2.4     Lymphocytes Absolute 08/26/2021 1.4     Monocytes Absolute 08/26/2021 1.2     Eosinophils Absolute 08/26/2021 0.1     Basophils Absolute 08/26/2021 0.0     Sodium 08/26/2021 140     Potassium 08/26/2021 5.0     Chloride 08/26/2021 102     CO2 08/26/2021 24     Anion Gap 08/26/2021 14     Glucose 08/26/2021 111*    BUN 08/26/2021 18     CREATININE 08/26/2021 1.4*    GFR Non- 08/26/2021 50*    GFR  08/26/2021 >60     Calcium 08/26/2021 9.8     Total Protein 08/26/2021 6.9     Albumin 08/26/2021 4.2     Albumin/Globulin Ratio 08/26/2021 1.6     Total Bilirubin 08/26/2021 0.4     Alkaline Phosphatase 08/26/2021 97     ALT 08/26/2021 16     AST 08/26/2021 17     Globulin

## 2021-09-27 NOTE — TELEPHONE ENCOUNTER
"Elier Ellis is a 1 y.o. female presenting today for   Chief Complaint   Patient presents with   • Cough   • Earache   • Fever   • Nasal Congestion   • Sore Throat       Subjective    Cough  This is a new problem. The current episode started in the past 7 days. The problem has been waxing and waning. The cough is non-productive. Associated symptoms include ear pain, a fever and a sore throat. Pertinent negatives include no rash. Treatments tried: Tylenol, Ibu, Organic cold remedy.   Earache   Associated symptoms include coughing and a sore throat. Pertinent negatives include no diarrhea, rash or vomiting.   Fever   Associated symptoms include coughing, ear pain and a sore throat. Pertinent negatives include no diarrhea, rash or vomiting.   Sore Throat  Associated symptoms include coughing, a fever and a sore throat. Pertinent negatives include no rash or vomiting.        The following portions of the patient's history were reviewed and updated as appropriate: allergies, current medications, problem list, past medical history, past surgical history, family history, and social history.    Review of Systems   Constitutional: Positive for fever. Negative for appetite change.   HENT: Positive for ear pain and sore throat.    Respiratory: Positive for cough.    Gastrointestinal: Negative for diarrhea and vomiting.   Skin: Negative for rash.         Objective    Vitals:    09/27/21 0917   Pulse: 135   Resp: 22   Temp: (!) 102.2 °F (39 °C)   TempSrc: Temporal   SpO2: 97%   Weight: 17.5 kg (38 lb 8 oz)   Height: 105.4 cm (41.5\")   HC: 52 cm (20.47\")     Body mass index is 15.72 kg/m².  Nursing notes and vitals reviewed.    Physical Exam  Constitutional:       General: She is not in acute distress.     Appearance: Normal appearance. She is not toxic-appearing.   HENT:      Head: Normocephalic.      Right Ear: Ear canal and external ear normal. A middle ear effusion is present. Tympanic membrane is erythematous and bulging. " Pt scheduled for 9/27/21      Left Ear: Ear canal and external ear normal.      Nose: Mucosal edema and rhinorrhea present.      Mouth/Throat:      Lips: Pink.      Mouth: Mucous membranes are moist.      Palate: No mass and lesions.      Pharynx: Oropharynx is clear.   Cardiovascular:      Rate and Rhythm: Regular rhythm.      Heart sounds: S1 normal and S2 normal.   Pulmonary:      Effort: Pulmonary effort is normal.      Breath sounds: Normal breath sounds.   Abdominal:      General: Abdomen is flat. Bowel sounds are normal.      Palpations: Abdomen is soft. There is no hepatomegaly, splenomegaly or mass.      Tenderness: There is no abdominal tenderness.   Lymphadenopathy:      Cervical: No cervical adenopathy.   Skin:     General: Skin is warm and dry.      Findings: No rash.   Neurological:      Mental Status: She is alert.           Assessment and Plan    Diagnoses and all orders for this visit:    1. Nasal congestion (Primary)  -     COVID-19,[facility] ROUTINE, NP/OP SWAB IN TRANSPORT MEDIA OR ESWAB 72 HR TAT - Swab, Nasopharynx    2. Sore throat  -     POC Rapid Strep A    3. Viral respiratory illness    4. Non-recurrent acute suppurative otitis media of right ear without spontaneous rupture of tympanic membrane  -     amoxicillin (AMOXIL) 400 MG/5ML suspension; Take 9.8 mL by mouth 2 (Two) Times a Day for 10 days.  Dispense: 196 mL; Refill: 0      Discussed supportive care and emergent S&S.        Medications, including side effects, were discussed with the patient. Patient verbalized understanding.  The plan of care was discussed. All questions were answered. Patient verbalized understanding.        Return if symptoms worsen or fail to improve.

## 2021-09-27 NOTE — PATIENT INSTRUCTIONS
Take metoprolol on the morning of surgery. Increase metoprolol to 1.5 tablets daily- monitor both heart rate and blood pressure. Write them down if you can and follow up with me in 2 weeks for a BP check.

## 2021-10-11 NOTE — PROGRESS NOTES
Adonis Jered    Age 66 y.o.    male    1950    MRN [id number]    10/22/2021  Arrival Time_____________  OR Time____________60 [address]     Procedure(s):  CYSTOSCOPY, TRANSURETHRAL RESECTION OF BLADDER TUMOR, LEFT STENT REMOVAL                      General     Surgeon(s):  Mery Garcia, MD      DAY ADMIT ___  SDS/OP ___  OUTPT IN BED ___         Phone [phone] (home)    PCP _____________________ Phone_________________ Epic ( ) Epic CE ( ) Appt ________    ADDITIONAL INFO __________________________________ Cardio/Consult _____________    NOTES _____________________________________________________________________    ____________________________________________________________________________    PAT APPT DATE:________ TIME: ________  FAXED QAD: _______  (__) H&P w/ hospitalist  ____________________________________________________________________________    COVID TEST: Date/Location______________        NURSING HISTORY COMPLETE: _______  (__) CBC       (__) W/ DIFF ___________  (__)  ECHO    __________  (__) Hgb A1C    ___________  (__) CHEST X RAY   __________  (__) LIPID PROFILE  ___________  (__) EKG   __________  (__) PT/PTT   ___________  (__) PFT's   __________  (__) BMP   ___________  (__) CAROTIDS  __________  (__) CMP   ___________  (__) VEIN MAPPING  __________  (__) U/A   ___________  (__) HISTORY & PHYSICAL __________  (__) URINE C & S  ___________  (__) CARDIAC CLEARANCE __________  (__) U/A W/ FLEX  ___________  (__) PULM.  CLEARANCE __________  (__) SERUM PREGNANCY ___________  (__) Check Epic DOS orders __________  (__) TYPE & SCREEN ________ repeat ( ) (__)  __________________ __________  (__) ALBUMIN   ___________  (__)  __________________ __________  (__) TRANSFERRIN  ___________  (__)  __________________ __________  (__) LIVER PROFILE  ___________  (__)  __________________ __________  (__) CARBOXY HGB  ___________  (__) URINE PREG DOS __________  (__) NICOTINE & MET.  ___________  (__) BLOOD SUGAR DOS __________  (__) PREALBUMIN  ___________    (__) MRSA NASAL SWAB ___________  (__) BLOOD THINNERS __________  (__) ACE/ ARBS: _____________________    (__) BETABLOCKERS ___________________

## 2021-10-18 ENCOUNTER — ANESTHESIA EVENT (OUTPATIENT)
Dept: OPERATING ROOM | Age: 71
End: 2021-10-18
Payer: MEDICARE

## 2021-10-18 ENCOUNTER — HOSPITAL ENCOUNTER (OUTPATIENT)
Dept: PREADMISSION TESTING | Age: 71
Discharge: HOME | End: 2021-10-22
Payer: MEDICARE

## 2021-10-18 LAB
BACTERIA: ABNORMAL /HPF
BASOPHILS ABSOLUTE: 0 K/UL (ref 0–0.2)
BASOPHILS RELATIVE PERCENT: 0.8 %
BILIRUBIN URINE: ABNORMAL
BLOOD, URINE: ABNORMAL
CLARITY: ABNORMAL
COLOR: ABNORMAL
EOSINOPHILS ABSOLUTE: 0.1 K/UL (ref 0–0.6)
EOSINOPHILS RELATIVE PERCENT: 1.2 %
GLUCOSE URINE: NEGATIVE MG/DL
HCT VFR BLD CALC: 44.5 % (ref 40.5–52.5)
HEMOGLOBIN: 13.9 G/DL (ref 13.5–17.5)
KETONES, URINE: NEGATIVE MG/DL
LEUKOCYTE ESTERASE, URINE: ABNORMAL
LYMPHOCYTES ABSOLUTE: 1.5 K/UL (ref 1–5.1)
LYMPHOCYTES RELATIVE PERCENT: 33 %
MCH RBC QN AUTO: 25.7 PG (ref 26–34)
MCHC RBC AUTO-ENTMCNC: 31.3 G/DL (ref 31–36)
MCV RBC AUTO: 82.2 FL (ref 80–100)
MICROSCOPIC EXAMINATION: YES
MONOCYTES ABSOLUTE: 0.9 K/UL (ref 0–1.3)
MONOCYTES RELATIVE PERCENT: 19.9 %
NEUTROPHILS ABSOLUTE: 2.1 K/UL (ref 1.7–7.7)
NEUTROPHILS RELATIVE PERCENT: 45.1 %
NITRITE, URINE: POSITIVE
PDW BLD-RTO: 16.9 % (ref 12.4–15.4)
PH UA: 6 (ref 5–8)
PLATELET # BLD: 287 K/UL (ref 135–450)
PMV BLD AUTO: 8.4 FL (ref 5–10.5)
PROTEIN UA: 100 MG/DL
RBC # BLD: 5.41 M/UL (ref 4.2–5.9)
RBC UA: >100 /HPF (ref 0–4)
SPECIFIC GRAVITY UA: 1.02 (ref 1–1.03)
URINE TYPE: ABNORMAL
UROBILINOGEN, URINE: 1 E.U./DL
WBC # BLD: 4.7 K/UL (ref 4–11)
WBC UA: ABNORMAL /HPF (ref 0–5)

## 2021-10-18 PROCEDURE — 36415 COLL VENOUS BLD VENIPUNCTURE: CPT

## 2021-10-18 PROCEDURE — U0005 INFEC AGEN DETEC AMPLI PROBE: HCPCS

## 2021-10-18 PROCEDURE — 81001 URINALYSIS AUTO W/SCOPE: CPT

## 2021-10-18 PROCEDURE — 85025 COMPLETE CBC W/AUTO DIFF WBC: CPT

## 2021-10-18 PROCEDURE — U0003 INFECTIOUS AGENT DETECTION BY NUCLEIC ACID (DNA OR RNA); SEVERE ACUTE RESPIRATORY SYNDROME CORONAVIRUS 2 (SARS-COV-2) (CORONAVIRUS DISEASE [COVID-19]), AMPLIFIED PROBE TECHNIQUE, MAKING USE OF HIGH THROUGHPUT TECHNOLOGIES AS DESCRIBED BY CMS-2020-01-R: HCPCS

## 2021-10-18 PROCEDURE — 87086 URINE CULTURE/COLONY COUNT: CPT

## 2021-10-18 RX ORDER — MOEXIPRIL HCL 15 MG
TABLET ORAL
Qty: 90 TABLET | Refills: 0 | Status: SHIPPED | OUTPATIENT
Start: 2021-10-18 | End: 2022-04-14 | Stop reason: SDUPTHER

## 2021-10-18 NOTE — TELEPHONE ENCOUNTER
.  Refill Request     Last Seen: Last Seen Department: 9/27/2021  Last Seen by PCP: 8/26/2021    Last Written: 6-22-21 90 with 3     Next Appointment:   Future Appointments   Date Time Provider Yehuda Rodriguez   8/29/2022 10:00 AM DO KERRIE [place] - [facility]       Future appointment scheduled      Requested Prescriptions     Pending Prescriptions Disp Refills    moexipril (UNIVASC) 15 MG tablet [Pharmacy Med Name: Moexipril HCl 15 MG Oral Tablet] 90 tablet 0     Sig: Take 1 tablet by mouth once daily

## 2021-10-19 LAB
SARS-COV-2: NOT DETECTED
URINE CULTURE, ROUTINE: NORMAL

## 2021-10-19 NOTE — PROGRESS NOTES
Preoperative Screening for Elective Surgery/Invasive Procedures While COVID-19 present in the community     Have you had any of the following symptoms? o Fever, chills  o Cough  o Shortness of breath  o Muscle aches/pain  o Diarrhea  o Abdominal pain, nausea, vomiting  o Loss or decrease in taste and / or smell   Risk of Exposure  o Have you recently been hospitalized for COVID-19 or flu-like illness, if so when?  o Recently diagnosed with COVID-19, if so when?  o Recently tested for COVID-19, if so when?  o Have you been in close contact with a person or family member who currently has or recently had COVID-19? If yes, when and in what context?  o Do you live with anybody who in the last 14 days has had fever, chills, shortness of breath, muscle aches, flu-like illness?  o Do you have any close contacts or family members who are currently in the hospital for COVID-19 or flu-like illness? If yes, assess recent close contact with this person. Indicate if the patient has a positive screen by answering yes to one or more of the above questions. Patients who test positive or screen positive prior to surgery or on the day of surgery should be evaluated in conjunction with the surgeon/proceduralist/anesthesiologist to determine the urgency of the procedure.      NO TO ABOVE

## 2021-10-19 NOTE — PROGRESS NOTES
1. Do not eat or drink anything after 12 midnight prior to surgery. This includes no water, chewing gum mints, or ice chips. You may brush your teeth and gargle the day of surgery but DO NOT SWALLOW THE WATER. 2. Please see your family doctor/pediatrician for a history and physical and/or concerning medications. Bring any test results/reports from your physician's office. If you are under the care of a heart doctor or specialist please be aware that you may be asked to see him or her for clearance. 3. You may be asked to stop blood thinners such as Coumadin, Plavix, Fragmin, and Lovenox or Anti-inflammatories such as Aspirin, Ibuprofen, Advil, and Naproxen prior to your surgery. Please check with your doctor before stopping these or any other medications. 4. Do not smoke, and do not drink any alcoholic beverages 24 hours prior to surgery. 5. You MUST make arrangements for a responsible adult to take you home after your surgery. For your safety, you will not be allowed to leave alone or drive yourself home. Your surgery will be cancelled if you do not have a ride home. Also for your safety, it is strongly suggested someone stay with you the first 24 hrs after your surgery. 6. A parent/legal guardian must accompany a child scheduled for surgery and plan to stay at the hospital until the child is discharged. Please do not bring other children with you. 7. For your comfort,please wear simple, loose fitting clothing to the hospital.  Please do not bring valuables (money, credit cards, checkbooks, etc.) Do not wear any makeup (including no eye makeup) or nail polish on your fingers or toes. 8. For your safety, please DO NOT wear any jewelry or piercings on day of surgery. All body piercing jewelry must be removed. 9. If you have dentures, they will be removed before going to the OR; for your convenience we will provide you with a container.   If you wear contact lenses or glasses, they will be removed, they will be removed, please bring a case for them. 10. If appicable,Please see your family doctor/pediatrician for a history & physical and/or concerning medications. Bring any test results/reports from your physician's office. 11. Remember to bring Blood Bank bracelet to the hospital on the day of surgery. 12. If you have a Living Will and Durable Power of  for Healthcare, please bring in a copy. 15. Notify your Surgeon if you develop any illness between now and surgery  time, cough, cold, fever, sore throat, nausea, vomiting, etc.  Please notify your surgeon if you experience dizziness, shortness of breath or blurred vision between now & the time of your surgery   14. DO NOT shave your operative site 96 hours prior to surgery. For face & neck surgery, men may use an electric razor 48 hours prior to surgery. 15. Shower the night before surgery with _X__Antibacterial soap ___Hibiclens   16. To provide excellent care visitors will be limited to one in the room at any given time. 17.  Please bring picture ID and insurance card. 18.  Visit our web site for additional information:  [employer] CSD[facility]/surgery.            -OP

## 2021-10-22 ENCOUNTER — ANESTHESIA (OUTPATIENT)
Dept: OPERATING ROOM | Age: 71
End: 2021-10-22
Payer: MEDICARE

## 2021-10-22 ENCOUNTER — HOSPITAL ENCOUNTER (OUTPATIENT)
Age: 71
Setting detail: OUTPATIENT SURGERY
Discharge: HOME | End: 2021-10-22
Attending: UROLOGY | Admitting: UROLOGY
Payer: MEDICARE

## 2021-10-22 VITALS
HEIGHT: 68 IN | OXYGEN SATURATION: 92 % | BODY MASS INDEX: 29.55 KG/M2 | SYSTOLIC BLOOD PRESSURE: 171 MMHG | RESPIRATION RATE: 16 BRPM | DIASTOLIC BLOOD PRESSURE: 73 MMHG | TEMPERATURE: 97.5 F | WEIGHT: 195.01 LBS | HEART RATE: 75 BPM

## 2021-10-22 VITALS
RESPIRATION RATE: 13 BRPM | OXYGEN SATURATION: 100 % | DIASTOLIC BLOOD PRESSURE: 58 MMHG | SYSTOLIC BLOOD PRESSURE: 125 MMHG

## 2021-10-22 DIAGNOSIS — G89.18 POST-OP PAIN: Primary | ICD-10-CM

## 2021-10-22 DIAGNOSIS — C67.9 MALIGNANT NEOPLASM OF URINARY BLADDER, UNSPECIFIED SITE (HCC): ICD-10-CM

## 2021-10-22 LAB
ABO/RH: NORMAL
ANTIBODY SCREEN: NORMAL

## 2021-10-22 PROCEDURE — 3600000014 HC SURGERY LEVEL 4 ADDTL 15MIN: Performed by: UROLOGY

## 2021-10-22 PROCEDURE — 86901 BLOOD TYPING SEROLOGIC RH(D): CPT

## 2021-10-22 PROCEDURE — 6360000002 HC RX W HCPCS: Performed by: NURSE ANESTHETIST, CERTIFIED REGISTERED

## 2021-10-22 PROCEDURE — 6370000000 HC RX 637 (ALT 250 FOR IP): Performed by: ANESTHESIOLOGY

## 2021-10-22 PROCEDURE — 2580000003 HC RX 258: Performed by: UROLOGY

## 2021-10-22 PROCEDURE — 3700000000 HC ANESTHESIA ATTENDED CARE: Performed by: UROLOGY

## 2021-10-22 PROCEDURE — 2500000003 HC RX 250 WO HCPCS: Performed by: NURSE ANESTHETIST, CERTIFIED REGISTERED

## 2021-10-22 PROCEDURE — 86850 RBC ANTIBODY SCREEN: CPT

## 2021-10-22 PROCEDURE — 36415 COLL VENOUS BLD VENIPUNCTURE: CPT

## 2021-10-22 PROCEDURE — 2709999900 HC NON-CHARGEABLE SUPPLY: Performed by: UROLOGY

## 2021-10-22 PROCEDURE — 7100000000 HC PACU RECOVERY - FIRST 15 MIN: Performed by: UROLOGY

## 2021-10-22 PROCEDURE — 88307 TISSUE EXAM BY PATHOLOGIST: CPT

## 2021-10-22 PROCEDURE — 2500000003 HC RX 250 WO HCPCS: Performed by: ANESTHESIOLOGY

## 2021-10-22 PROCEDURE — 6360000002 HC RX W HCPCS: Performed by: UROLOGY

## 2021-10-22 PROCEDURE — 3600000004 HC SURGERY LEVEL 4 BASE: Performed by: UROLOGY

## 2021-10-22 PROCEDURE — 2580000003 HC RX 258: Performed by: ANESTHESIOLOGY

## 2021-10-22 PROCEDURE — 2720000010 HC SURG SUPPLY STERILE: Performed by: UROLOGY

## 2021-10-22 PROCEDURE — 7100000011 HC PHASE II RECOVERY - ADDTL 15 MIN: Performed by: UROLOGY

## 2021-10-22 PROCEDURE — 86900 BLOOD TYPING SEROLOGIC ABO: CPT

## 2021-10-22 PROCEDURE — 7100000001 HC PACU RECOVERY - ADDTL 15 MIN: Performed by: UROLOGY

## 2021-10-22 PROCEDURE — 7100000010 HC PHASE II RECOVERY - FIRST 15 MIN: Performed by: UROLOGY

## 2021-10-22 PROCEDURE — 6360000002 HC RX W HCPCS: Performed by: ANESTHESIOLOGY

## 2021-10-22 PROCEDURE — 3700000001 HC ADD 15 MINUTES (ANESTHESIA): Performed by: UROLOGY

## 2021-10-22 RX ORDER — MORPHINE SULFATE 2 MG/ML
2 INJECTION, SOLUTION INTRAMUSCULAR; INTRAVENOUS EVERY 5 MIN PRN
Status: DISCONTINUED | OUTPATIENT
Start: 2021-10-22 | End: 2021-10-22 | Stop reason: HOSPADM

## 2021-10-22 RX ORDER — OXYCODONE HYDROCHLORIDE AND ACETAMINOPHEN 5; 325 MG/1; MG/1
2 TABLET ORAL PRN
Status: COMPLETED | OUTPATIENT
Start: 2021-10-22 | End: 2021-10-22

## 2021-10-22 RX ORDER — MAGNESIUM HYDROXIDE 1200 MG/15ML
LIQUID ORAL PRN
Status: DISCONTINUED | OUTPATIENT
Start: 2021-10-22 | End: 2021-10-22 | Stop reason: ALTCHOICE

## 2021-10-22 RX ORDER — DIPHENHYDRAMINE HYDROCHLORIDE 50 MG/ML
12.5 INJECTION INTRAMUSCULAR; INTRAVENOUS
Status: DISCONTINUED | OUTPATIENT
Start: 2021-10-22 | End: 2021-10-22 | Stop reason: HOSPADM

## 2021-10-22 RX ORDER — LABETALOL HYDROCHLORIDE 5 MG/ML
5 INJECTION, SOLUTION INTRAVENOUS EVERY 10 MIN PRN
Status: DISCONTINUED | OUTPATIENT
Start: 2021-10-22 | End: 2021-10-22 | Stop reason: HOSPADM

## 2021-10-22 RX ORDER — PROPOFOL 10 MG/ML
INJECTION, EMULSION INTRAVENOUS PRN
Status: DISCONTINUED | OUTPATIENT
Start: 2021-10-22 | End: 2021-10-22 | Stop reason: SDUPTHER

## 2021-10-22 RX ORDER — DEXAMETHASONE SODIUM PHOSPHATE 4 MG/ML
INJECTION, SOLUTION INTRA-ARTICULAR; INTRALESIONAL; INTRAMUSCULAR; INTRAVENOUS; SOFT TISSUE PRN
Status: DISCONTINUED | OUTPATIENT
Start: 2021-10-22 | End: 2021-10-22 | Stop reason: SDUPTHER

## 2021-10-22 RX ORDER — PROMETHAZINE HYDROCHLORIDE 25 MG/ML
6.25 INJECTION, SOLUTION INTRAMUSCULAR; INTRAVENOUS
Status: DISCONTINUED | OUTPATIENT
Start: 2021-10-22 | End: 2021-10-22 | Stop reason: HOSPADM

## 2021-10-22 RX ORDER — MEPERIDINE HYDROCHLORIDE 50 MG/ML
12.5 INJECTION INTRAMUSCULAR; INTRAVENOUS; SUBCUTANEOUS EVERY 5 MIN PRN
Status: DISCONTINUED | OUTPATIENT
Start: 2021-10-22 | End: 2021-10-22 | Stop reason: HOSPADM

## 2021-10-22 RX ORDER — LIDOCAINE HYDROCHLORIDE 20 MG/ML
INJECTION, SOLUTION INFILTRATION; PERINEURAL PRN
Status: DISCONTINUED | OUTPATIENT
Start: 2021-10-22 | End: 2021-10-22 | Stop reason: SDUPTHER

## 2021-10-22 RX ORDER — ONDANSETRON 2 MG/ML
4 INJECTION INTRAMUSCULAR; INTRAVENOUS
Status: DISCONTINUED | OUTPATIENT
Start: 2021-10-22 | End: 2021-10-22 | Stop reason: HOSPADM

## 2021-10-22 RX ORDER — ROCURONIUM BROMIDE 10 MG/ML
INJECTION, SOLUTION INTRAVENOUS PRN
Status: DISCONTINUED | OUTPATIENT
Start: 2021-10-22 | End: 2021-10-22 | Stop reason: SDUPTHER

## 2021-10-22 RX ORDER — FENTANYL CITRATE 50 UG/ML
INJECTION, SOLUTION INTRAMUSCULAR; INTRAVENOUS PRN
Status: DISCONTINUED | OUTPATIENT
Start: 2021-10-22 | End: 2021-10-22 | Stop reason: SDUPTHER

## 2021-10-22 RX ORDER — OXYCODONE HYDROCHLORIDE AND ACETAMINOPHEN 5; 325 MG/1; MG/1
1 TABLET ORAL PRN
Status: COMPLETED | OUTPATIENT
Start: 2021-10-22 | End: 2021-10-22

## 2021-10-22 RX ORDER — SODIUM CHLORIDE 0.9 % (FLUSH) 0.9 %
10 SYRINGE (ML) INJECTION EVERY 12 HOURS SCHEDULED
Status: DISCONTINUED | OUTPATIENT
Start: 2021-10-22 | End: 2021-10-22 | Stop reason: HOSPADM

## 2021-10-22 RX ORDER — ONDANSETRON 2 MG/ML
INJECTION INTRAMUSCULAR; INTRAVENOUS PRN
Status: DISCONTINUED | OUTPATIENT
Start: 2021-10-22 | End: 2021-10-22 | Stop reason: SDUPTHER

## 2021-10-22 RX ORDER — CIPROFLOXACIN 500 MG/1
500 TABLET, FILM COATED ORAL 2 TIMES DAILY
Qty: 6 TABLET | Refills: 0 | Status: SHIPPED | OUTPATIENT
Start: 2021-10-22 | End: 2021-10-25

## 2021-10-22 RX ORDER — SODIUM CHLORIDE, SODIUM LACTATE, POTASSIUM CHLORIDE, CALCIUM CHLORIDE 600; 310; 30; 20 MG/100ML; MG/100ML; MG/100ML; MG/100ML
INJECTION, SOLUTION INTRAVENOUS CONTINUOUS
Status: DISCONTINUED | OUTPATIENT
Start: 2021-10-22 | End: 2021-10-22 | Stop reason: HOSPADM

## 2021-10-22 RX ORDER — LABETALOL HYDROCHLORIDE 5 MG/ML
INJECTION, SOLUTION INTRAVENOUS PRN
Status: DISCONTINUED | OUTPATIENT
Start: 2021-10-22 | End: 2021-10-22 | Stop reason: SDUPTHER

## 2021-10-22 RX ORDER — SODIUM CHLORIDE 0.9 % (FLUSH) 0.9 %
10 SYRINGE (ML) INJECTION PRN
Status: DISCONTINUED | OUTPATIENT
Start: 2021-10-22 | End: 2021-10-22 | Stop reason: HOSPADM

## 2021-10-22 RX ORDER — MAGNESIUM HYDROXIDE 1200 MG/15ML
LIQUID ORAL CONTINUOUS PRN
Status: COMPLETED | OUTPATIENT
Start: 2021-10-22 | End: 2021-10-22

## 2021-10-22 RX ORDER — HYDROCODONE BITARTRATE AND ACETAMINOPHEN 5; 325 MG/1; MG/1
1 TABLET ORAL EVERY 6 HOURS PRN
Qty: 12 TABLET | Refills: 0 | Status: SHIPPED | OUTPATIENT
Start: 2021-10-22 | End: 2021-10-25

## 2021-10-22 RX ORDER — SODIUM CHLORIDE 9 MG/ML
25 INJECTION, SOLUTION INTRAVENOUS PRN
Status: DISCONTINUED | OUTPATIENT
Start: 2021-10-22 | End: 2021-10-22 | Stop reason: HOSPADM

## 2021-10-22 RX ORDER — HYDRALAZINE HYDROCHLORIDE 20 MG/ML
5 INJECTION INTRAMUSCULAR; INTRAVENOUS EVERY 10 MIN PRN
Status: DISCONTINUED | OUTPATIENT
Start: 2021-10-22 | End: 2021-10-22 | Stop reason: HOSPADM

## 2021-10-22 RX ORDER — PHENYLEPHRINE HCL IN 0.9% NACL 1 MG/10 ML
SYRINGE (ML) INTRAVENOUS PRN
Status: DISCONTINUED | OUTPATIENT
Start: 2021-10-22 | End: 2021-10-22 | Stop reason: SDUPTHER

## 2021-10-22 RX ORDER — MORPHINE SULFATE 2 MG/ML
1 INJECTION, SOLUTION INTRAMUSCULAR; INTRAVENOUS EVERY 5 MIN PRN
Status: DISCONTINUED | OUTPATIENT
Start: 2021-10-22 | End: 2021-10-22 | Stop reason: HOSPADM

## 2021-10-22 RX ADMIN — MORPHINE SULFATE 2 MG: 2 INJECTION, SOLUTION INTRAMUSCULAR; INTRAVENOUS at 11:16

## 2021-10-22 RX ADMIN — ONDANSETRON 4 MG: 2 INJECTION INTRAMUSCULAR; INTRAVENOUS at 07:38

## 2021-10-22 RX ADMIN — PROPOFOL 150 MG: 10 INJECTION, EMULSION INTRAVENOUS at 07:32

## 2021-10-22 RX ADMIN — LABETALOL HYDROCHLORIDE 5 MG: 5 INJECTION, SOLUTION INTRAVENOUS at 07:59

## 2021-10-22 RX ADMIN — FENTANYL CITRATE 50 MCG: 50 INJECTION INTRAMUSCULAR; INTRAVENOUS at 07:31

## 2021-10-22 RX ADMIN — SUGAMMADEX 200 MG: 100 INJECTION, SOLUTION INTRAVENOUS at 08:05

## 2021-10-22 RX ADMIN — FENTANYL CITRATE 25 MCG: 50 INJECTION INTRAMUSCULAR; INTRAVENOUS at 07:53

## 2021-10-22 RX ADMIN — DEXAMETHASONE SODIUM PHOSPHATE 8 MG: 4 INJECTION, SOLUTION INTRAMUSCULAR; INTRAVENOUS at 07:38

## 2021-10-22 RX ADMIN — CEFAZOLIN 2000 MG: 10 INJECTION, POWDER, FOR SOLUTION INTRAVENOUS at 07:27

## 2021-10-22 RX ADMIN — HYDROMORPHONE HYDROCHLORIDE 0.5 MG: 1 INJECTION, SOLUTION INTRAMUSCULAR; INTRAVENOUS; SUBCUTANEOUS at 09:00

## 2021-10-22 RX ADMIN — FAMOTIDINE 20 MG: 10 INJECTION, SOLUTION INTRAVENOUS at 06:33

## 2021-10-22 RX ADMIN — HYDROMORPHONE HYDROCHLORIDE 0.5 MG: 1 INJECTION, SOLUTION INTRAMUSCULAR; INTRAVENOUS; SUBCUTANEOUS at 08:31

## 2021-10-22 RX ADMIN — LIDOCAINE HYDROCHLORIDE 80 MG: 20 INJECTION, SOLUTION INFILTRATION; PERINEURAL at 07:31

## 2021-10-22 RX ADMIN — SODIUM CHLORIDE, POTASSIUM CHLORIDE, SODIUM LACTATE AND CALCIUM CHLORIDE: 600; 310; 30; 20 INJECTION, SOLUTION INTRAVENOUS at 07:28

## 2021-10-22 RX ADMIN — ROCURONIUM BROMIDE 40 MG: 10 SOLUTION INTRAVENOUS at 07:32

## 2021-10-22 RX ADMIN — OXYCODONE AND ACETAMINOPHEN 1 TABLET: 5; 325 TABLET ORAL at 09:51

## 2021-10-22 RX ADMIN — Medication 100 MCG: at 07:43

## 2021-10-22 RX ADMIN — SODIUM CHLORIDE, POTASSIUM CHLORIDE, SODIUM LACTATE AND CALCIUM CHLORIDE: 600; 310; 30; 20 INJECTION, SOLUTION INTRAVENOUS at 06:32

## 2021-10-22 RX ADMIN — ROCURONIUM BROMIDE 10 MG: 10 SOLUTION INTRAVENOUS at 07:55

## 2021-10-22 RX ADMIN — FENTANYL CITRATE 25 MCG: 50 INJECTION INTRAMUSCULAR; INTRAVENOUS at 07:47

## 2021-10-22 ASSESSMENT — PULMONARY FUNCTION TESTS
PIF_VALUE: 18
PIF_VALUE: 17
PIF_VALUE: 19
PIF_VALUE: 20
PIF_VALUE: 19
PIF_VALUE: 19
PIF_VALUE: 1
PIF_VALUE: 20
PIF_VALUE: 1
PIF_VALUE: 19
PIF_VALUE: 18
PIF_VALUE: 1
PIF_VALUE: 17
PIF_VALUE: 27
PIF_VALUE: 19
PIF_VALUE: 19
PIF_VALUE: 0
PIF_VALUE: 18
PIF_VALUE: 19
PIF_VALUE: 7
PIF_VALUE: 18
PIF_VALUE: 19
PIF_VALUE: 4
PIF_VALUE: 6
PIF_VALUE: 21
PIF_VALUE: 2
PIF_VALUE: 19
PIF_VALUE: 19
PIF_VALUE: 1
PIF_VALUE: 19
PIF_VALUE: 18
PIF_VALUE: 19
PIF_VALUE: 20
PIF_VALUE: 20
PIF_VALUE: 18
PIF_VALUE: 19
PIF_VALUE: 1
PIF_VALUE: 1

## 2021-10-22 ASSESSMENT — PAIN SCALES - GENERAL
PAINLEVEL_OUTOF10: 4
PAINLEVEL_OUTOF10: 10
PAINLEVEL_OUTOF10: 7
PAINLEVEL_OUTOF10: 10

## 2021-10-22 ASSESSMENT — PAIN - FUNCTIONAL ASSESSMENT: PAIN_FUNCTIONAL_ASSESSMENT: 0-10

## 2021-10-22 NOTE — ANESTHESIA PRE PROCEDURE
Department of Anesthesiology  Preprocedure Note       Name:  Rhianna Hollins   Age:  66 y.o.  :  1950                                          MRN:  [id number]         Date:  10/22/2021      Surgeon: Dianne Chandra):  Jia Mojica MD    Procedure: Procedure(s):  CYSTOSCOPY, TRANSURETHRAL RESECTION OF BLADDER TUMOR, LEFT STENT REMOVAL    Medications prior to admission:   Prior to Admission medications    Medication Sig Start Date End Date Taking? Authorizing Provider   methIMAzole (TAPAZOLE) 5 MG tablet Take one tab daily 9/20/21 3/14/22 Yes Deshawn Camargo APRN - CNP   atorvastatin (LIPITOR) 80 MG tablet Take 1 tablet by mouth once daily  Patient taking differently: nightly Take 1 tablet by mouth once daily 21  Yes Godfrey Mejia DO   fluticasone-salmeterol (ADVAIR DISKUS) 250-50 MCG/DOSE AEPB Inhale 1 puff into the lungs every 12 hours 21  Yes Godfrey Mejia DO   sertraline (ZOLOFT) 100 MG tablet Take 1 tablet by mouth once daily 6/10/21  Yes Godfrey Mejia DO   metoprolol (TOPROL-XL) 25 MG XL tablet Take 25 mg by mouth daily.    Yes Historical Provider, MD   aspirin 81 MG tablet Take 81 mg by mouth nightly otc   Yes Historical Provider, MD   moexipril (UNIVASC) 15 MG tablet Take 1 tablet by mouth once daily  Patient taking differently: nightly Take 1 tablet by mouth once daily 10/18/21   Johan Carlos DO       Current medications:    Current Facility-Administered Medications   Medication Dose Route Frequency Provider Last Rate Last Admin    lactated ringers infusion   IntraVENous Continuous Shoaib Berg  mL/hr at 10/22/21 0632 New Bag at 10/22/21 3910    sodium chloride flush 0.9 % injection 10 mL  10 mL IntraVENous 2 times per day Shoaib Berg MD        sodium chloride flush 0.9 % injection 10 mL  10 mL IntraVENous PRN Shoaib Berg MD        0.9 % sodium chloride infusion  25 mL IntraVENous PRN Shoaib Berg MD        ceFAZolin (ANCEF) 2000 mg in dextrose 5 % 100 mL IVPB  2,000 mg IntraVENous On Call to 500 Hoda Mariee MD           Allergies: Allergies   Allergen Reactions    Penicillins Other (See Comments)     UNKNOWN, CHILD       Problem List:    Patient Active Problem List   Diagnosis Code    Hyperlipidemia E78.5    Depression F32. A    Hypertension I10    CAD (coronary artery disease) I25.10    History of renal calculi Z87.442    Tobacco dependence F17.200    Vitamin D deficiency E55.9    Esophageal thickening K22.89    Hyperthyroidism E05.90    Elevated glucose R73.09    Thyroid nodule E04.1    Prediabetes R73.03    Pulmonary emphysema, unspecified emphysema type (HCC) J43.9    Claudication ([facility]) I73.9       Past Medical History:        Diagnosis Date    CAD (coronary artery disease)     Cancer ([facility] [address]     BLADDER    COPD (chronic obstructive pulmonary disease) ([facility])     Depression     ADJUSTMENT DISORDER    Heart attack ([facility] [address] 10/10    History of renal calculi     Hyperlipidemia     Hypertension     Hyperthyroidism     RBBB        Past Surgical History:        Procedure Laterality Date    CARDIAC SURGERY      2 stents    COLONOSCOPY  14    colonoscopy and egd    CYSTOSCOPY         Social History:    Social History     Tobacco Use    Smoking status: Former Smoker     Packs/day: 1.00     Years: 20.00     Pack years: 20.00     Types: Cigarettes     Quit date: 2019     Years since quittin.7    Smokeless tobacco: Never Used   Substance Use Topics    Alcohol use:  No                                Counseling given: Not Answered      Vital Signs (Current):   Vitals:    10/19/21 0954 10/22/21 0620   BP:  (!) 164/76   Pulse:  69   Resp:  16   Temp:  98.1 °F (36.7 °C)   TempSrc:  Temporal   SpO2:  97%   Weight: 190 lb (86.2 kg) 195 lb 0.1 oz (88.5 kg)   Height: 5' 8\" (1.727 m) 5' 8\" (1.727 m)                                              BP Readings from Last 3 Encounters:   10/22/21 (!) 164/76   21 (!) 158/62 08/26/21 130/80       NPO Status: Time of last liquid consumption: 2350                        Time of last solid consumption: 2350                        Date of last liquid consumption: 10/22/21                        Date of last solid food consumption: 10/21/21    BMI:   Wt Readings from Last 3 Encounters:   10/22/21 195 lb 0.1 oz (88.5 kg)   09/27/21 198 lb 9.6 oz (90.1 kg)   08/26/21 197 lb 6.4 oz (89.5 kg)     Body mass index is 29.65 kg/m². CBC:   Lab Results   Component Value Date    WBC 4.7 10/18/2021    RBC 5.41 10/18/2021    HGB 13.9 10/18/2021    HCT 44.5 10/18/2021    MCV 82.2 10/18/2021    RDW 16.9 10/18/2021     10/18/2021       CMP:   Lab Results   Component Value Date     09/27/2021    K 4.6 09/27/2021     09/27/2021    CO2 24 09/27/2021    BUN 17 09/27/2021    CREATININE 1.2 09/27/2021    GFRAA >60 09/27/2021    GFRAA >60 03/19/2013    AGRATIO 1.6 08/26/2021    LABGLOM 59 09/27/2021    GLUCOSE 112 09/27/2021    PROT 6.9 08/26/2021    PROT 6.6 03/19/2013    CALCIUM 9.7 09/27/2021    BILITOT 0.4 08/26/2021    ALKPHOS 97 08/26/2021    AST 17 08/26/2021    ALT 16 08/26/2021       POC Tests: No results for input(s): POCGLU, POCNA, POCK, POCCL, POCBUN, POCHEMO, POCHCT in the last 72 hours.     Coags:   Lab Results   Component Value Date    PROTIME 11.1 02/09/2011    INR 1.02 02/09/2011    APTT 37.7 02/09/2011       HCG (If Applicable): No results found for: PREGTESTUR, PREGSERUM, HCG, HCGQUANT     ABGs: No results found for: PHART, PO2ART, WNP7HQE, FTA1YDC, BEART, V7FKUJDU     Type & Screen (If Applicable):  No results found for: LABABO, LABRH    Drug/Infectious Status (If Applicable):  No results found for: HIV, HEPCAB    COVID-19 Screening (If Applicable):   Lab Results   Component Value Date    COVID19 Not Detected 10/18/2021           Anesthesia Evaluation   no history of anesthetic complications:   Airway: Mallampati: II  TM distance: >3 FB   Neck ROM: limited  Mouth opening: > = 3 FB Dental:    (+) upper dentures and lower dentures      Pulmonary:   (+) COPD:                            ROS comment: H/o tob   Cardiovascular:    (+) hypertension:, past MI: > 6 months, CAD:, CABG/stent (cor stent x 2):, hyperlipidemia                  Neuro/Psych:   (+) psychiatric history:depression/anxiety             GI/Hepatic/Renal:             Endo/Other:    (+) hyperthyroidism::., malignancy/cancer. Abdominal:             Vascular: Other Findings:             Anesthesia Plan      general     ASA 3     (Risks, benefits and alternatives of GA discussed with pt. Questions answered. Willing to proceed.)  Induction: intravenous. Anesthetic plan and risks discussed with patient and spouse.                       Hillary Platt MD   10/22/2021

## 2021-10-22 NOTE — ANESTHESIA POSTPROCEDURE EVALUATION
Department of Anesthesiology  Postprocedure Note    Patient: Moustapha Waggoner  MRN: [id number]  YOB: 1950  Date of evaluation: 10/22/2021  Time:  2:48 PM     Procedure Summary     Date: 10/22/21 Room / Location: [address]    Anesthesia Start: 5835 Anesthesia Stop: 1397    Procedure: CYSTOSCOPY, TRANSURETHRAL RESECTION OF BLADDER TUMOR, LEFT STENT REMOVAL (Left Bladder) Diagnosis:       Malignant neoplasm of urinary bladder, unspecified site ([facility] [address]      (MALIGNANT NEOPLASM OF BLADDER, UNSPECIFIED)    Surgeons: Kat Scruggs MD Responsible Provider: Elva Zhang MD    Anesthesia Type: general ASA Status: 3          Anesthesia Type: general    Lu Phase I: Lu Score: 9    Lu Phase II: Lu Score: 10    Last vitals: Reviewed and per EMR flowsheets. Anesthesia Post Evaluation    Patient location during evaluation: PACU  Patient participation: complete - patient participated  Level of consciousness: awake and alert  Airway patency: patent  Nausea & Vomiting: no nausea and no vomiting  Complications: no  Cardiovascular status: blood pressure returned to baseline  Respiratory status: acceptable  Hydration status: euvolemic  Comments: VSS on transfer to phase 2 recovery. No anesthetic complications.

## 2021-10-22 NOTE — BRIEF OP NOTE
Brief Postoperative Note      Patient: Sarahi Alves  YOB: 1950  MRN: [id number]    Date of Procedure: 10/22/2021    Pre-Op Diagnosis: MALIGNANT NEOPLASM OF BLADDER, UNSPECIFIED, retained left ureteral stent    Post-Op Diagnosis: Same       Procedure(s):  CYSTOSCOPY, TRANSURETHRAL RESECTION OF BLADDER TUMOR, LEFT STENT REMOVAL    Surgeon(s):  Norm Santa MD    Assistant:  First Assistant: Mar Lan RN    Anesthesia: General    Estimated Blood Loss (mL): less than 50     Complications: None    Specimens:   ID Type Source Tests Collected by Time Destination   A : bladder tumor Tissue Tissue SURGICAL PATHOLOGY Norm Santa MD 10/22/2021 0757        Implants:  * No implants in log *      Drains: * No LDAs found *    Findings: left stent in place, inflammation in left trigone    Electronically signed by Norm Santa MD on 10/22/2021 at 8:18 AM

## 2021-10-22 NOTE — PROGRESS NOTES
Dr. Clary Kim notified that pt is unable to void and he said that he does not have to void prior to leaving. He said to discharge him.

## 2021-10-22 NOTE — OP NOTE
[address], [address]                                OPERATIVE REPORT    PATIENT NAME: Riya Robertson                        :        1950  MED REC NO:   [id number]                          ROOM:  ACCOUNT NO:   [de-identified]                           ADMIT DATE: 10/22/2021  PROVIDER:     Pamella Jaramillo. Lanie Trejo MD    DATE OF PROCEDURE:  10/22/2021    LOCATION:  [place] [address]:  Retained left double-J ureteral stent and  bladder cancer. POSTOPERATIVE DIAGNOSES:  Retained left double-J ureteral stent and  bladder cancer. PROCEDURE PERFORMED:  Cystoscopy, left stent removal, and transurethral  resection of the bladder tumor (small). SURGEON:  Pamella Jaramillo. Lanie Trejo MD    INDICATIONS FOR THE PROCEDURE:  The patient is a 75-year-old male, who  had a bladder tumor resection, a left ureteral stent. This pathology  was high-grade, no muscles involving the specimen. The risks and  benefits of the above-named procedures were discussed with the patient  and he agreed to proceed. DESCRIPTION OF THE PROCEDURE:  The patient had preoperative antibiotics  and general anesthesia, was in the lithotomy position. His genitalia  were prepped and draped in the usual sterile fashion. A 21-Welsh rigid  cystoscope was inserted into the patient's urethra. There was signs of  inflammation around the left ureteric orifice and the stent coming out  of this. I did not see any other tumors throughout the bladder. I then  placed a 24-Welsh continuous flow monopolar loop resectoscope and  resected this tissue around the left ureteric orifice ensuring to get  muscle in the specimen. I used cautery to stop any bleeding and I then  removed the stent. The patient was awoken and sent to the recovery  room. He tolerated the procedure well and given prescriptions for pain  pills and antibiotics.     PLAN:  The plan is, I will call him with results. ESTIMATED BLOOD LOSS:  10 mL.         Jordyn Aden MD    D: 10/22/2021 10:21:36       T: 10/22/2021 10:57:28     [id number]  Job#: [id number]     Doc#: [id number]    CC:

## 2021-10-25 DIAGNOSIS — E05.90 HYPERTHYROIDISM: ICD-10-CM

## 2021-10-26 RX ORDER — METHIMAZOLE 5 MG/1
TABLET ORAL
Qty: 30 TABLET | Refills: 0 | Status: SHIPPED | OUTPATIENT
Start: 2021-10-26 | End: 2021-11-29 | Stop reason: SDUPTHER

## 2021-11-01 LAB — PATHOLOGY/CYTOLOGY REPORT: NORMAL

## 2021-11-15 ENCOUNTER — CLINICAL DOCUMENTATION (OUTPATIENT)
Dept: OTHER | Age: 71
End: 2021-11-15

## 2021-11-29 DIAGNOSIS — E05.90 HYPERTHYROIDISM: ICD-10-CM

## 2021-11-29 RX ORDER — SERTRALINE HYDROCHLORIDE 100 MG/1
TABLET, FILM COATED ORAL
Qty: 90 TABLET | Refills: 0 | Status: SHIPPED | OUTPATIENT
Start: 2021-11-29 | End: 2022-04-14 | Stop reason: SDUPTHER

## 2021-11-29 RX ORDER — METHIMAZOLE 5 MG/1
TABLET ORAL
Qty: 30 TABLET | Refills: 0 | Status: SHIPPED | OUTPATIENT
Start: 2021-11-29 | End: 2021-12-31 | Stop reason: SDUPTHER

## 2021-11-29 NOTE — TELEPHONE ENCOUNTER
.  Refill Request     Last Seen: Last Seen Department: 9/27/2021  Last Seen by PCP: 8/26/2021    Last Written: 6-10-21 90 WITH 0     Next Appointment:   Future Appointments   Date Time Provider Yehuda Rodriguez   8/29/2022 10:00 AM DO KERRIE [place] - [facility]       Future appointment scheduled      Requested Prescriptions     Pending Prescriptions Disp Refills    sertraline (ZOLOFT) 100 MG tablet 90 tablet 0     Sig: Take 1 tablet by mouth once daily

## 2021-12-31 DIAGNOSIS — E05.90 HYPERTHYROIDISM: ICD-10-CM

## 2021-12-31 RX ORDER — METHIMAZOLE 5 MG/1
TABLET ORAL
Qty: 30 TABLET | Refills: 0 | Status: SHIPPED | OUTPATIENT
Start: 2021-12-31 | End: 2022-02-02 | Stop reason: SDUPTHER

## 2022-01-05 ENCOUNTER — TELEPHONE (OUTPATIENT)
Dept: FAMILY MEDICINE CLINIC | Age: 72
End: 2022-01-05

## 2022-01-05 NOTE — TELEPHONE ENCOUNTER
----- Message from Kathy Delacruz sent at 1/5/2022  3:30 PM EST -----  Subject: Appointment Request    Reason for Call: Routine Pre-Op    QUESTIONS  Type of Appointment? Established Patient  Reason for appointment request? Available appointments did not meet   patient need  Additional Information for Provider? Pt wants to obey a pre op appt,   surgery is on Feb 10, 2022, first availability with provider Dr. Chicho Parekh is on March 28, 2022. Pt would like to have the appointment with   CHRIS Grubbs. , Aarti [place] [phone]. Thank   you.  ---------------------------------------------------------------------------  --------------  CALL BACK INFO  What is the best way for the office to contact you? OK to leave message on   voicemail  Preferred Call Back Phone Number? [phone]  ---------------------------------------------------------------------------  --------------  SCRIPT ANSWERS  Relationship to Patient? Other  Representative Name? Lucian Paige (spouse)  Additional information verified (besides Name and Date of Birth)? Address  Specialty Confirmation? Primary Care  Do you have questions for your provider that need to be answered prior to   scheduling your pre-op appointment? No  Have you been diagnosed with, awaiting test results for, or told that you   are suspected of having COVID-19 (Coronavirus)? (If patient has tested   negative or was tested as a requirement for work, school, or travel and   not based on symptoms, answer no)? No  Within the past two weeks have you developed any of the following symptoms   (answer no if symptoms have been present longer than 2 weeks or began   more than 2 weeks ago)? Fever or Chills, Cough, Shortness of breath or   difficulty breathing, Loss of taste or smell, Sore throat, Nasal   congestion, Sneezing or runny nose, Fatigue or generalized body aches   (answer no if pain is specific to a body part e.g. back pain), Diarrhea,   Headache?  No  Have you had close contact with someone with COVID-19 in the last 14 days? No  (Service Expert  click yes below to proceed with [employer] As Usual   Scheduling)?  Yes

## 2022-01-15 DIAGNOSIS — E78.2 MIXED HYPERLIPIDEMIA: ICD-10-CM

## 2022-01-15 RX ORDER — ATORVASTATIN CALCIUM 80 MG/1
TABLET, FILM COATED ORAL
Qty: 90 TABLET | Refills: 3 | Status: SHIPPED | OUTPATIENT
Start: 2022-01-15

## 2022-01-15 NOTE — TELEPHONE ENCOUNTER
.  Refill Request     Last Seen: Last Seen Department: 9/27/2021  Last Seen by PCP: 8/26/2021    Last Written: 6/22/21 90 with 3     Next Appointment:   Future Appointments   Date Time Provider Yehuda Rodriguez   2/3/2022 11:30 AM CHRIS Denny  [place] - [facility]SE L   8/29/2022 10:00 AM Godfrey Mejia DO [facility] - [facility] L         Requested Prescriptions     Pending Prescriptions Disp Refills    atorvastatin (LIPITOR) 80 MG tablet 90 tablet 3     Sig: Take 1 tablet by mouth once daily

## 2022-02-02 ENCOUNTER — OFFICE VISIT (OUTPATIENT)
Dept: FAMILY MEDICINE CLINIC | Age: 72
End: 2022-02-02
Payer: MEDICARE

## 2022-02-02 VITALS
OXYGEN SATURATION: 96 % | WEIGHT: 194.8 LBS | DIASTOLIC BLOOD PRESSURE: 70 MMHG | HEART RATE: 74 BPM | SYSTOLIC BLOOD PRESSURE: 124 MMHG | BODY MASS INDEX: 29.62 KG/M2

## 2022-02-02 DIAGNOSIS — E05.90 HYPERTHYROIDISM: ICD-10-CM

## 2022-02-02 DIAGNOSIS — Z01.818 PREOP EXAMINATION: Primary | ICD-10-CM

## 2022-02-02 PROCEDURE — G8417 CALC BMI ABV UP PARAM F/U: HCPCS | Performed by: NURSE PRACTITIONER

## 2022-02-02 PROCEDURE — 99214 OFFICE O/P EST MOD 30 MIN: CPT | Performed by: NURSE PRACTITIONER

## 2022-02-02 PROCEDURE — 1123F ACP DISCUSS/DSCN MKR DOCD: CPT | Performed by: NURSE PRACTITIONER

## 2022-02-02 PROCEDURE — 93000 ELECTROCARDIOGRAM COMPLETE: CPT | Performed by: NURSE PRACTITIONER

## 2022-02-02 PROCEDURE — 3017F COLORECTAL CA SCREEN DOC REV: CPT | Performed by: NURSE PRACTITIONER

## 2022-02-02 PROCEDURE — G8484 FLU IMMUNIZE NO ADMIN: HCPCS | Performed by: NURSE PRACTITIONER

## 2022-02-02 PROCEDURE — G8427 DOCREV CUR MEDS BY ELIG CLIN: HCPCS | Performed by: NURSE PRACTITIONER

## 2022-02-02 PROCEDURE — 1036F TOBACCO NON-USER: CPT | Performed by: NURSE PRACTITIONER

## 2022-02-02 PROCEDURE — 4040F PNEUMOC VAC/ADMIN/RCVD: CPT | Performed by: NURSE PRACTITIONER

## 2022-02-02 RX ORDER — METHIMAZOLE 5 MG/1
TABLET ORAL
Qty: 30 TABLET | Refills: 0 | Status: SHIPPED | OUTPATIENT
Start: 2022-02-02 | End: 2022-03-24 | Stop reason: SDUPTHER

## 2022-02-02 RX ORDER — TAMSULOSIN HYDROCHLORIDE 0.4 MG/1
CAPSULE ORAL
COMMUNITY
Start: 2021-12-29

## 2022-02-02 RX ORDER — OXYBUTYNIN CHLORIDE 5 MG/1
TABLET ORAL
COMMUNITY
Start: 2022-01-05

## 2022-02-02 NOTE — PROGRESS NOTES
Preoperative Consultation      Jennie Bahena  YOB: 1950    Date of Service:  2/2/2022    Vitals:    02/02/22 1653   BP: 124/70   Site: Right Upper Arm   Position: Sitting   Cuff Size: Large Adult   Pulse: 74   SpO2: 96%   Weight: 194 lb 12.8 oz (88.4 kg)      Wt Readings from Last 2 Encounters:   02/02/22 194 lb 12.8 oz (88.4 kg)   10/22/21 195 lb 0.1 oz (88.5 kg)     BP Readings from Last 3 Encounters:   02/02/22 124/70   10/22/21 (!) 125/58   10/22/21 (!) 171/73        Chief Complaint   Patient presents with   Zachary Tracy Pre-op Exam     Urology 2/10/2022 for exploratory bladder procedure following recent treatment & removal of bladder cancer     Allergies   Allergen Reactions    Penicillins Other (See Comments)     UNKNOWN, CHILD     Outpatient Medications Marked as Taking for the 2/2/22 encounter (Office Visit) with NEWTON Briggs - CNP   Medication Sig Dispense Refill    methIMAzole (TAPAZOLE) 5 MG tablet Take one tab daily 30 tablet 0    oxybutynin (DITROPAN) 5 MG tablet TAKE 1 TABLET BY MOUTH TWICE DAILY      tamsulosin (FLOMAX) 0.4 MG capsule TAKE 1 CAPSULE BY MOUTH IN THE EVENING      atorvastatin (LIPITOR) 80 MG tablet Take 1 tablet by mouth once daily 90 tablet 3    sertraline (ZOLOFT) 100 MG tablet Take 1 tablet by mouth once daily 90 tablet 0    moexipril (UNIVASC) 15 MG tablet Take 1 tablet by mouth once daily (Patient taking differently: nightly Take 1 tablet by mouth once daily) 90 tablet 0    fluticasone-salmeterol (ADVAIR DISKUS) 250-50 MCG/DOSE AEPB Inhale 1 puff into the lungs every 12 hours 180 each 3    metoprolol (TOPROL-XL) 25 MG XL tablet Take 25 mg by mouth daily.  aspirin 81 MG tablet Take 81 mg by mouth nightly otc         This patient presents to the office today for a preoperative consultation at the request of surgeon, Dr. Delphine Stratton, who plans on performing Cystoscopy on February   10 at [facility].   The current problem began 6 months ago, and symptoms have been unchanged with time. Conservative therapy: N/A.     Planned anesthesia: General   Known anesthesia problems: None   Bleeding risk: No recent or remote history of abnormal bleeding  Personal or FH of DVT/PE: No    Patient objection to receiving blood products: No    Patient Active Problem List   Diagnosis    Hyperlipidemia    Depression    Hypertension    CAD (coronary artery disease)    History of renal calculi    Tobacco dependence    Vitamin D deficiency    Esophageal thickening    Hyperthyroidism    Elevated glucose    Thyroid nodule    Prediabetes    Pulmonary emphysema, unspecified emphysema type ([address]    Claudication ([address]       Past Medical History:   Diagnosis Date    CAD (coronary artery disease)     Cancer ([address]     BLADDER    COPD (chronic obstructive pulmonary disease) ([address]     Depression     ADJUSTMENT DISORDER    Heart attack ([address] 10/10    History of renal calculi     Hyperlipidemia     Hypertension     Hyperthyroidism     RBBB      Past Surgical History:   Procedure Laterality Date    CARDIAC SURGERY  2010    2 stents    COLONOSCOPY  5/2/14    colonoscopy and egd    CYSTOSCOPY      CYSTOSCOPY Left 10/22/2021    CYSTOSCOPY, TRANSURETHRAL RESECTION OF BLADDER TUMOR, LEFT STENT REMOVAL performed by Savi Hook MD at [facility] History   Problem Relation Age of Onset    Hearing Loss Mother     Heart Disease Mother     High Blood Pressure Mother     High Cholesterol Mother     Heart Disease Father     High Blood Pressure Father     High Cholesterol Father     Arthritis Sister     High Cholesterol Sister     Stroke Maternal Aunt      Social History     Socioeconomic History    Marital status:      Spouse name: Not on file    Number of children: Not on file    Years of education: Not on file    Highest education level: Not on file   Occupational History    Not on file   Tobacco Use    Smoking status: Former Smoker     Packs/day: 1.00     Years: 20.00     Pack years: 20.00     Types: Cigarettes     Quit date: 1/27/2019     Years since quitting: 3.0    Smokeless tobacco: Never Used   Vaping Use    Vaping Use: Not on file   Substance and Sexual Activity    Alcohol use: No    Drug use: No    Sexual activity: Not on file   Other Topics Concern    Not on file   Social History Narrative    Not on file     Social Determinants of Health     Financial Resource Strain: Low Risk     Difficulty of Paying Living Expenses: Not hard at all   Food Insecurity: No Food Insecurity    Worried About Running Out of Food in the Last Year: Never true    Tiffanie of Food in the Last Year: Never true   Transportation Needs: No Transportation Needs    Lack of Transportation (Medical): No    Lack of Transportation (Non-Medical): No   Physical Activity:     Days of Exercise per Week: Not on file    Minutes of Exercise per Session: Not on file   Stress:     Feeling of Stress : Not on file   Social Connections:     Frequency of Communication with Friends and Family: Not on file    Frequency of Social Gatherings with Friends and Family: Not on file    Attends Zoroastrianism Services: Not on file    Active Member of [address] [place] [address] or Organizations: Not on file    Attends Club or Organization Meetings: Not on file    Marital Status: Not on file   Intimate Partner Violence:     Fear of Current or Ex-Partner: Not on file    Emotionally Abused: Not on file    Physically Abused: Not on file    Sexually Abused: Not on file   Housing Stability:     Unable to Pay for Housing in the Last Year: Not on file    Number of [place] in the Last Year: Not on file    Unstable Housing in the Last Year: Not on file       Review of Systems  A comprehensive review of systems was negative except for what was noted in the HPI. Physical Exam   Constitutional: He is oriented to person, place, and time. He appears well-developed and well-nourished. No distress.    HENT:   Head: Normocephalic and atraumatic. Mouth/Throat: Uvula is midline, oropharynx is clear and moist and mucous membranes are normal.   Eyes: Conjunctivae and EOM are normal. Pupils are equal, round, and reactive to light. Neck: Trachea normal and normal range of motion. Neck supple. No JVD present. Carotid bruit is not present. No mass and no thyromegaly present. Cardiovascular: Normal rate, regular rhythm, normal heart sounds and intact distal pulses. Exam reveals no gallop and no friction rub. No murmur heard. Pulmonary/Chest: Effort normal and breath sounds normal. No respiratory distress. He has no wheezes. He has no rales. Abdominal: Soft. bowel sounds are normal. He exhibits no distension and no mass. There is no hepatosplenomegaly. No tenderness. Musculoskeletal: He exhibits no edema and no tenderness. Neurological: He is alert and oriented to person, place, and time. He has normal strength. No cranial nerve deficit or sensory deficit. Coordination and gait normal.   Skin: Skin is warm and dry. No rash noted. No erythema. Psychiatric: He has a normal mood and affect. His behavior is normal.     EKG Interpretation:  normal EKG, normal sinus rhythm, RBBB, unchanged from previous tracings. Lab Review   Lab Results   Component Value Date     02/07/2022    K 4.3 02/07/2022     02/07/2022    CO2 23 02/07/2022    BUN 12 02/07/2022    CREATININE 1.3 02/07/2022    GLUCOSE 100 02/07/2022    CALCIUM 9.8 02/07/2022     Lab Results   Component Value Date    CKTOTAL 69 04/08/2014    CKMB 0.29 02/09/2011    CKMB 3.51 10/26/2010    CKMBINDEX not calc 10/26/2010    TROPONINI 0.010 01/02/2014     Lab Results   Component Value Date    WBC 5.4 02/07/2022    HGB 14.1 02/07/2022    HCT 44.4 02/07/2022    MCV 79.7 02/07/2022     02/07/2022     Lab Results   Component Value Date    CHOL 170 09/27/2021    TRIG 155 09/27/2021    HDL 52 09/27/2021    HDL 42 02/09/2011           Assessment:       66 y.o. patient with planned surgery as above. Known risk factors for perioperative complications: Coronary artery disease, COPD, Hypertension, Peripheral vascular disease- intermittent claudication  Current medications which may produce withdrawal symptoms if withheld perioperatively: none      Plan:     1. Preoperative workup as follows: ECG, hemoglobin, hematocrit, electrolytes, creatinine, glucose  2. Change in medication regimen before surgery: Discontinue ASA, NSAIDs, Fish Oil, Vit E 7 days before surgery  3. Prophylaxis for cardiac events with perioperative beta-blockers: Currently taking  metoprolol  ACC/AHA indications for pre-operative beta-blocker use:    · Vascular surgery with history of postitive stress test  · Intermediate or high risk surgery with history of CAD   · Intermediate or high risk surgery with multiple clinical predictors of CAD- 2 of the following: history of compensated or prior heart failure, history of cerebrovascular disease, DM, or renal insufficiency    Routine administration of higher-dose, long-acting metoprolol in beta-blockernaïve patients on the day of surgery, and in the absence of dose titration is associated with an overall increase in mortality. Beta-blockers should be started days to weeks prior to surgery and titrated to pulse < 70.  4. Deep vein thrombosis prophylaxis: regimen to be chosen by surgical team  5.  No contraindications to planned surgery

## 2022-02-07 DIAGNOSIS — Z01.818 PREOP EXAMINATION: ICD-10-CM

## 2022-02-07 LAB
ANION GAP SERPL CALCULATED.3IONS-SCNC: 16 MMOL/L (ref 3–16)
BUN BLDV-MCNC: 12 MG/DL (ref 7–20)
CALCIUM SERPL-MCNC: 9.8 MG/DL (ref 8.3–10.6)
CHLORIDE BLD-SCNC: 104 MMOL/L (ref 99–110)
CO2: 23 MMOL/L (ref 21–32)
CREAT SERPL-MCNC: 1.3 MG/DL (ref 0.8–1.3)
GFR AFRICAN AMERICAN: >60
GFR NON-AFRICAN AMERICAN: 54
GLUCOSE BLD-MCNC: 100 MG/DL (ref 70–99)
HCT VFR BLD CALC: 44.4 % (ref 40.5–52.5)
HEMOGLOBIN: 14.1 G/DL (ref 13.5–17.5)
MCH RBC QN AUTO: 25.3 PG (ref 26–34)
MCHC RBC AUTO-ENTMCNC: 31.8 G/DL (ref 31–36)
MCV RBC AUTO: 79.7 FL (ref 80–100)
PDW BLD-RTO: 18.2 % (ref 12.4–15.4)
PLATELET # BLD: 150 K/UL (ref 135–450)
PMV BLD AUTO: 8 FL (ref 5–10.5)
POTASSIUM SERPL-SCNC: 4.3 MMOL/L (ref 3.5–5.1)
RBC # BLD: 5.57 M/UL (ref 4.2–5.9)
SODIUM BLD-SCNC: 143 MMOL/L (ref 136–145)
WBC # BLD: 5.4 K/UL (ref 4–11)

## 2022-03-08 ENCOUNTER — TELEPHONE (OUTPATIENT)
Dept: ORTHOPEDIC SURGERY | Age: 72
End: 2022-03-08

## 2022-03-08 NOTE — TELEPHONE ENCOUNTER
Attempted to contact patient to inform them about the [facility] knee joint pain program. Patient can call [phone] to find out more information or to schedule an appointment with a joint pain orthopedic specialist.

## 2022-03-24 DIAGNOSIS — E05.90 HYPERTHYROIDISM: ICD-10-CM

## 2022-03-24 RX ORDER — METHIMAZOLE 5 MG/1
TABLET ORAL
Qty: 90 TABLET | Refills: 1 | Status: SHIPPED | OUTPATIENT
Start: 2022-03-24 | End: 2022-06-20 | Stop reason: SDUPTHER

## 2022-04-14 RX ORDER — MOEXIPRIL HCL 15 MG
TABLET ORAL
Qty: 90 TABLET | Refills: 0 | Status: SHIPPED | OUTPATIENT
Start: 2022-04-14 | End: 2022-05-18 | Stop reason: SDUPTHER

## 2022-04-14 RX ORDER — SERTRALINE HYDROCHLORIDE 100 MG/1
TABLET, FILM COATED ORAL
Qty: 90 TABLET | Refills: 0 | Status: SHIPPED | OUTPATIENT
Start: 2022-04-14 | End: 2022-08-29 | Stop reason: SDUPTHER

## 2022-04-14 NOTE — TELEPHONE ENCOUNTER
.  Refill Request     Last Seen: Last Seen Department: 2/2/2022  Last Seen by PCP: Visit date not found    Last Written: 11-29-21 90 with 0     Next Appointment:   Future Appointments   Date Time Provider Yehuda Rodriguez   8/29/2022 10:00 AM DO [facility]RI[facility] [place] - [place]       Future appointment scheduled      Requested Prescriptions     Pending Prescriptions Disp Refills    sertraline (ZOLOFT) 100 MG tablet 90 tablet 0     Sig: Take 1 tablet by mouth once daily

## 2022-04-14 NOTE — TELEPHONE ENCOUNTER
.  Refill Request     Last Seen: Last Seen Department: 2/2/2022  Last Seen by PCP: 8/26/2021    Last Written: 10-18-21 90 with 0     Next Appointment:   Future Appointments   Date Time Provider Yehuda Rodriguez   8/29/2022 10:00 AM DO [facility] [place] - [facility]       Future appointment scheduled      Requested Prescriptions     Pending Prescriptions Disp Refills    moexipril (UNIVASC) 15 MG tablet 90 tablet 0

## 2022-05-18 ENCOUNTER — OFFICE VISIT (OUTPATIENT)
Dept: FAMILY MEDICINE CLINIC | Age: 72
End: 2022-05-18
Payer: MEDICARE

## 2022-05-18 ENCOUNTER — TELEPHONE (OUTPATIENT)
Dept: FAMILY MEDICINE CLINIC | Age: 72
End: 2022-05-18

## 2022-05-18 VITALS
BODY MASS INDEX: 29.5 KG/M2 | HEART RATE: 69 BPM | WEIGHT: 194 LBS | OXYGEN SATURATION: 96 % | DIASTOLIC BLOOD PRESSURE: 78 MMHG | SYSTOLIC BLOOD PRESSURE: 126 MMHG

## 2022-05-18 DIAGNOSIS — J43.9 PULMONARY EMPHYSEMA, UNSPECIFIED EMPHYSEMA TYPE (HCC): ICD-10-CM

## 2022-05-18 DIAGNOSIS — Z01.818 PREOP EXAMINATION: ICD-10-CM

## 2022-05-18 DIAGNOSIS — C67.9 MALIGNANT NEOPLASM OF URINARY BLADDER, UNSPECIFIED SITE (HCC): ICD-10-CM

## 2022-05-18 DIAGNOSIS — Z01.818 PREOP EXAMINATION: Primary | ICD-10-CM

## 2022-05-18 DIAGNOSIS — I73.9 CLAUDICATION (HCC): ICD-10-CM

## 2022-05-18 PROCEDURE — G8417 CALC BMI ABV UP PARAM F/U: HCPCS | Performed by: NURSE PRACTITIONER

## 2022-05-18 PROCEDURE — 1036F TOBACCO NON-USER: CPT | Performed by: NURSE PRACTITIONER

## 2022-05-18 PROCEDURE — 3017F COLORECTAL CA SCREEN DOC REV: CPT | Performed by: NURSE PRACTITIONER

## 2022-05-18 PROCEDURE — 99214 OFFICE O/P EST MOD 30 MIN: CPT | Performed by: NURSE PRACTITIONER

## 2022-05-18 PROCEDURE — G8427 DOCREV CUR MEDS BY ELIG CLIN: HCPCS | Performed by: NURSE PRACTITIONER

## 2022-05-18 PROCEDURE — 1123F ACP DISCUSS/DSCN MKR DOCD: CPT | Performed by: NURSE PRACTITIONER

## 2022-05-18 PROCEDURE — 3023F SPIROM DOC REV: CPT | Performed by: NURSE PRACTITIONER

## 2022-05-18 RX ORDER — MOEXIPRIL HCL 15 MG
TABLET ORAL
Qty: 135 TABLET | Refills: 1 | Status: SHIPPED | OUTPATIENT
Start: 2022-05-18

## 2022-05-18 NOTE — PROGRESS NOTES
remote history of abnormal bleeding  Personal or FH of DVT/PE: No   Patient objection to receiving blood products: No     Patient Active Problem List   Diagnosis    Hyperlipidemia    Depression    Hypertension    CAD (coronary artery disease)    History of renal calculi    Tobacco dependence    Vitamin D deficiency    Esophageal thickening    Hyperthyroidism    Elevated glucose    Thyroid nodule    Prediabetes    Pulmonary emphysema, unspecified emphysema type [facility]    Claudication [facility])       Past Medical History:   Diagnosis Date    CAD (coronary artery disease)     [facility]     BLADDER    COPD [facility] disease) [facility]     Depression     ADJUSTMENT DISORDER    Heart attack ([facility] 10/10    History of renal calculi     Hyperlipidemia     Hypertension     Hyperthyroidism     RBBB      Past Surgical History:   Procedure Laterality Date    CARDIAC SURGERY  2010    2 stents    COLONOSCOPY  5/2/14    colonoscopy and egd    CYSTOSCOPY      CYSTOSCOPY Left 10/22/2021    CYSTOSCOPY, TRANSURETHRAL RESECTION OF BLADDER TUMOR, LEFT STENT REMOVAL performed by Wallace Anna MD at [facility] History   Problem Relation Age of Onset    Hearing Loss Mother     Heart Disease Mother     High Blood Pressure Mother     High Cholesterol Mother     Heart Disease Father     High Blood Pressure Father     High Cholesterol Father     Arthritis Sister     High Cholesterol Sister     Stroke Maternal Aunt      Social History     Socioeconomic History    Marital status:      Spouse name: Not on file    Number of children: Not on file    Years of education: Not on file    Highest education level: Not on file   Occupational History    Not on file   Tobacco Use    Smoking status: Former Smoker     Packs/day: 1.00     Years: 20.00     Pack years: 20.00     Types: Cigarettes     Quit date: 1/27/2019     Years since quitting: 3.3    Smokeless tobacco: Never Used are normal. Pupils are equal, round, and reactive to light. Neck: Trachea normal and normal range of motion. Neck supple. No JVD present. Carotid bruit is not present. No mass and no thyromegaly present. Cardiovascular: Normal rate, regular rhythm, normal heart sounds and intact distal pulses. Exam reveals no gallop and no friction rub. No murmur heard. Pulmonary/Chest: Effort normal and breath sounds normal. No respiratory distress. He has no wheezes. He has no rales. Abdominal: Soft. bowel sounds are normal. He exhibits no distension and no mass. There is no hepatosplenomegaly. No tenderness. Musculoskeletal: He exhibits no edema and no tenderness. Neurological: He is alert and oriented to person, place, and time. He has normal strength. No cranial nerve deficit or sensory deficit. Coordination and gait normal.   Skin: Skin is warm and dry. No rash noted. No erythema. Psychiatric: He has a normal mood and affect. His behavior is normal.     EKG Interpretation:  Cardiac Clearance [facility]    Lab Review   Lab Results   Component Value Date     05/18/2022    K 4.8 05/18/2022     05/18/2022    CO2 26 05/18/2022    BUN 23 05/18/2022    CREATININE 1.5 05/18/2022    GLUCOSE 109 05/18/2022    CALCIUM 10.0 05/18/2022     Lab Results   Component Value Date    CKTOTAL 69 04/08/2014    CKMB 0.29 02/09/2011    CKMB 3.51 10/26/2010    CKMBINDEX not calc 10/26/2010    TROPONINI 0.010 01/02/2014     Lab Results   Component Value Date    WBC 5.3 05/18/2022    HGB 13.8 05/18/2022    HCT 42.7 05/18/2022    MCV 78.2 05/18/2022     05/18/2022     Lab Results   Component Value Date    CHOL 170 09/27/2021    TRIG 155 09/27/2021    HDL 52 09/27/2021    HDL 42 02/09/2011           Assessment:       66 y.o. patient with planned surgery as above.     Known risk factors for perioperative complications: Coronary artery disease, COPD, Hypertension, Peripheral vascular disease- claudication, former smoker  Current medications which may produce withdrawal symptoms if withheld perioperatively: none      Plan:     1. Preoperative workup as follows: hemoglobin, hematocrit, electrolytes, creatinine, glucose  2. Change in medication regimen before surgery: Discontinue NSAIDs, fish oil  7 days before surgery. Cardiac clearance encounter note per [facility], and was instructed to hold ASA per surgeon instructions. 3. Prophylaxis for cardiac events with perioperative beta-blockers: Currently taking metoprolol. ACC/AHA indications for pre-operative beta-blocker use:    · Vascular surgery with history of postitive stress test  · Intermediate or high risk surgery with history of CAD   · Intermediate or high risk surgery with multiple clinical predictors of CAD- 2 of the following: history of compensated or prior heart failure, history of cerebrovascular disease, DM, or renal insufficiency    Routine administration of higher-dose, long-acting metoprolol in beta-blocker-naïve patients on the day of surgery, and in the absence of dose titration is associated with an overall increase in mortality. Beta-blockers should be started days to weeks prior to surgery and titrated to pulse < 70.  4. Deep vein thrombosis prophylaxis: regimen to be chosen by surgical team  5. No contraindications to planned surgery-already has cardiac clearance from [facility].

## 2022-05-18 NOTE — TELEPHONE ENCOUNTER
Spoke with wife (on HIPAA) she is asking if Eduardo's new patient appointment with you can be over video?

## 2022-05-19 LAB
ANION GAP SERPL CALCULATED.3IONS-SCNC: 10 MMOL/L (ref 3–16)
BUN BLDV-MCNC: 23 MG/DL (ref 7–20)
CALCIUM SERPL-MCNC: 10 MG/DL (ref 8.3–10.6)
CHLORIDE BLD-SCNC: 103 MMOL/L (ref 99–110)
CO2: 26 MMOL/L (ref 21–32)
CREAT SERPL-MCNC: 1.5 MG/DL (ref 0.8–1.3)
GFR AFRICAN AMERICAN: 55
GFR NON-AFRICAN AMERICAN: 46
GLUCOSE BLD-MCNC: 109 MG/DL (ref 70–99)
HCT VFR BLD CALC: 42.7 % (ref 40.5–52.5)
HEMOGLOBIN: 13.8 G/DL (ref 13.5–17.5)
MCH RBC QN AUTO: 25.2 PG (ref 26–34)
MCHC RBC AUTO-ENTMCNC: 32.3 G/DL (ref 31–36)
MCV RBC AUTO: 78.2 FL (ref 80–100)
PDW BLD-RTO: 18.7 % (ref 12.4–15.4)
PLATELET # BLD: 199 K/UL (ref 135–450)
PMV BLD AUTO: 8.4 FL (ref 5–10.5)
POTASSIUM SERPL-SCNC: 4.8 MMOL/L (ref 3.5–5.1)
RBC # BLD: 5.47 M/UL (ref 4.2–5.9)
SODIUM BLD-SCNC: 139 MMOL/L (ref 136–145)
WBC # BLD: 5.3 K/UL (ref 4–11)

## 2022-05-21 DIAGNOSIS — N28.9 DECREASED RENAL FUNCTION: ICD-10-CM

## 2022-05-21 DIAGNOSIS — I10 ESSENTIAL HYPERTENSION: Primary | ICD-10-CM

## 2022-06-06 DIAGNOSIS — I10 ESSENTIAL HYPERTENSION: ICD-10-CM

## 2022-06-06 DIAGNOSIS — N28.9 DECREASED RENAL FUNCTION: ICD-10-CM

## 2022-06-06 LAB
ALBUMIN SERPL-MCNC: 4.1 G/DL (ref 3.4–5)
ANION GAP SERPL CALCULATED.3IONS-SCNC: 13 MMOL/L (ref 3–16)
BUN BLDV-MCNC: 19 MG/DL (ref 7–20)
CALCIUM SERPL-MCNC: 9.4 MG/DL (ref 8.3–10.6)
CHLORIDE BLD-SCNC: 105 MMOL/L (ref 99–110)
CO2: 24 MMOL/L (ref 21–32)
CREAT SERPL-MCNC: 1.3 MG/DL (ref 0.8–1.3)
GFR AFRICAN AMERICAN: >60
GFR NON-AFRICAN AMERICAN: 54
GLUCOSE BLD-MCNC: 118 MG/DL (ref 70–99)
PHOSPHORUS: 2.9 MG/DL (ref 2.5–4.9)
POTASSIUM SERPL-SCNC: 4.9 MMOL/L (ref 3.5–5.1)
SODIUM BLD-SCNC: 142 MMOL/L (ref 136–145)

## 2022-06-20 ENCOUNTER — TELEMEDICINE (OUTPATIENT)
Dept: FAMILY MEDICINE CLINIC | Age: 72
End: 2022-06-20
Payer: MEDICARE

## 2022-06-20 DIAGNOSIS — F32.A DEPRESSION, UNSPECIFIED DEPRESSION TYPE: ICD-10-CM

## 2022-06-20 DIAGNOSIS — I10 PRIMARY HYPERTENSION: ICD-10-CM

## 2022-06-20 DIAGNOSIS — J43.9 PULMONARY EMPHYSEMA, UNSPECIFIED EMPHYSEMA TYPE (HCC): ICD-10-CM

## 2022-06-20 DIAGNOSIS — E05.90 HYPERTHYROIDISM: Primary | ICD-10-CM

## 2022-06-20 DIAGNOSIS — C67.9 MALIGNANT NEOPLASM OF URINARY BLADDER, UNSPECIFIED SITE (HCC): ICD-10-CM

## 2022-06-20 PROCEDURE — G8427 DOCREV CUR MEDS BY ELIG CLIN: HCPCS | Performed by: PHYSICIAN ASSISTANT

## 2022-06-20 PROCEDURE — 3023F SPIROM DOC REV: CPT | Performed by: PHYSICIAN ASSISTANT

## 2022-06-20 PROCEDURE — 1123F ACP DISCUSS/DSCN MKR DOCD: CPT | Performed by: PHYSICIAN ASSISTANT

## 2022-06-20 PROCEDURE — 99214 OFFICE O/P EST MOD 30 MIN: CPT | Performed by: PHYSICIAN ASSISTANT

## 2022-06-20 PROCEDURE — 1036F TOBACCO NON-USER: CPT | Performed by: PHYSICIAN ASSISTANT

## 2022-06-20 PROCEDURE — 3017F COLORECTAL CA SCREEN DOC REV: CPT | Performed by: PHYSICIAN ASSISTANT

## 2022-06-20 PROCEDURE — G8417 CALC BMI ABV UP PARAM F/U: HCPCS | Performed by: PHYSICIAN ASSISTANT

## 2022-06-20 RX ORDER — METHIMAZOLE 5 MG/1
TABLET ORAL
Qty: 90 TABLET | Refills: 1 | Status: SHIPPED | OUTPATIENT
Start: 2022-06-20 | End: 2022-10-03 | Stop reason: SDUPTHER

## 2022-06-20 ASSESSMENT — PATIENT HEALTH QUESTIONNAIRE - PHQ9
1. LITTLE INTEREST OR PLEASURE IN DOING THINGS: 0
3. TROUBLE FALLING OR STAYING ASLEEP: 0
6. FEELING BAD ABOUT YOURSELF - OR THAT YOU ARE A FAILURE OR HAVE LET YOURSELF OR YOUR FAMILY DOWN: 0
9. THOUGHTS THAT YOU WOULD BE BETTER OFF DEAD, OR OF HURTING YOURSELF: 0
SUM OF ALL RESPONSES TO PHQ QUESTIONS 1-9: 0
4. FEELING TIRED OR HAVING LITTLE ENERGY: 0
SUM OF ALL RESPONSES TO PHQ QUESTIONS 1-9: 0
7. TROUBLE CONCENTRATING ON THINGS, SUCH AS READING THE NEWSPAPER OR WATCHING TELEVISION: 0
SUM OF ALL RESPONSES TO PHQ9 QUESTIONS 1 & 2: 0
2. FEELING DOWN, DEPRESSED OR HOPELESS: 0
5. POOR APPETITE OR OVEREATING: 0
10. IF YOU CHECKED OFF ANY PROBLEMS, HOW DIFFICULT HAVE THESE PROBLEMS MADE IT FOR YOU TO DO YOUR WORK, TAKE CARE OF THINGS AT HOME, OR GET ALONG WITH OTHER PEOPLE: 0
8. MOVING OR SPEAKING SO SLOWLY THAT OTHER PEOPLE COULD HAVE NOTICED. OR THE OPPOSITE, BEING SO FIGETY OR RESTLESS THAT YOU HAVE BEEN MOVING AROUND A LOT MORE THAN USUAL: 0

## 2022-06-20 ASSESSMENT — ENCOUNTER SYMPTOMS
RHINORRHEA: 0
NAUSEA: 0
SORE THROAT: 0
COUGH: 0
CONSTIPATION: 0
DIARRHEA: 0
ABDOMINAL PAIN: 0
VOMITING: 0
SHORTNESS OF BREATH: 0

## 2022-06-20 NOTE — PROGRESS NOTES
Shwetha Courtney (:  1950) is a Established patient, here for evaluation of the following:    Assessment & Plan   Below is the assessment and plan developed based on review of pertinent history, physical exam, labs, studies, and medications. 1. Hyperthyroidism  -     TSH; Future  -     T4, Free; Future  -     T3, Free; Future  -     methIMAzole (TAPAZOLE) 5 MG tablet; Take one tab daily, Disp-90 tablet, R-1No more refills , patient needs an appointmentNormal  -     Vicki Singh MD, Endocrinology, [facility]  2. Depression, unspecified depression type  - stable on zoloft, continue     3. Pulmonary emphysema, unspecified emphysema type ([address]  - continue inhalers    4. Malignant neoplasm of urinary bladder, unspecified site [facility])  - continue follow up with Dr. Nicole Ambriz. Consult notes reviewed. 5. Primary hypertension  - stable on current regimen, continue. Return in about 4 months (around 10/20/2022) for Annual wellness Diabetes Mellitus, Hyperlipidemia. Subjective   HPI     Patient presents to establish care. Bladder cancer- following with urology (Dr. Nicole Ambriz). Continues on flomax and oxybutynin. q 4 months surveillance cystoscopy. Anxiety and depression: stable on zoloft. Sleeping well, normal appetite. Hyperthyroidism: previously following with endo, needs new referral. Continues on methimazole. Due for labs. CAD s/p stenting: Follows with cards, continues on statin, lisinopril and metoprolol. History of DM: currently diet controlled. Has not been monitoring BG levels. Due for recheck. Review of Systems   Constitutional: Negative for activity change, chills and fever. HENT: Negative for congestion, ear pain, rhinorrhea and sore throat. Eyes: Negative for visual disturbance. Respiratory: Negative for cough and shortness of breath. Cardiovascular: Negative for chest pain and palpitations.    Gastrointestinal: Negative for abdominal pain, constipation, pertinent observable physical exam findings:-                 Sophia Brar, was evaluated through a synchronous (real-time) audio-video encounter. The patient (or guardian if applicable) is aware that this is a billable service, which includes applicable co-pays. This Virtual Visit was conducted with patient's (and/or legal guardian's) consent. The visit was conducted pursuant to the emergency declaration under the [address] [place] [address] authority and the AFAR and Countercepts General Act. Patient identification was verified, and a caregiver was present when appropriate. The patient was located at Home: [address] [address]. Provider was located at [facility] (Appt Dept): [address]  [address].         --CHRIS Claros

## 2022-07-07 ENCOUNTER — TELEPHONE (OUTPATIENT)
Dept: VASCULAR SURGERY | Age: 72
End: 2022-07-07

## 2022-07-07 DIAGNOSIS — I73.9 PERIPHERAL VASCULAR DISEASE, UNSPECIFIED (HCC): Primary | ICD-10-CM

## 2022-07-07 NOTE — TELEPHONE ENCOUNTER
Called patient regarding needing an arterial duplex scan prior to visit. Scheduling lm for vascular lab regarding scheduling arterial duplex for tomorrow prior to apt. Vascular lab did not answer so she left message and will call me back.  tal

## 2022-07-08 ENCOUNTER — OFFICE VISIT (OUTPATIENT)
Dept: VASCULAR SURGERY | Age: 72
End: 2022-07-08
Payer: MEDICARE

## 2022-07-08 ENCOUNTER — HOSPITAL ENCOUNTER (OUTPATIENT)
Dept: VASCULAR LAB | Age: 72
Discharge: HOME | End: 2022-07-08
Payer: MEDICARE

## 2022-07-08 VITALS
DIASTOLIC BLOOD PRESSURE: 58 MMHG | HEIGHT: 68 IN | WEIGHT: 194 LBS | BODY MASS INDEX: 29.4 KG/M2 | SYSTOLIC BLOOD PRESSURE: 130 MMHG

## 2022-07-08 DIAGNOSIS — I73.9 PERIPHERAL VASCULAR DISEASE, UNSPECIFIED (HCC): ICD-10-CM

## 2022-07-08 DIAGNOSIS — I73.9 SEVERE CLAUDICATION (HCC): Primary | ICD-10-CM

## 2022-07-08 PROCEDURE — 93925 LOWER EXTREMITY STUDY: CPT

## 2022-07-08 PROCEDURE — G8427 DOCREV CUR MEDS BY ELIG CLIN: HCPCS | Performed by: SURGERY

## 2022-07-08 PROCEDURE — 99203 OFFICE O/P NEW LOW 30 MIN: CPT | Performed by: SURGERY

## 2022-07-08 PROCEDURE — G8417 CALC BMI ABV UP PARAM F/U: HCPCS | Performed by: SURGERY

## 2022-07-08 NOTE — PROGRESS NOTES
Outpatient Consultation / H&P    Date of Consultation:  7/8/2022    PCP:  CHRIS Mcgrath     Referring Provider:  Dr. Laura Melo     Chief Complaint:   Chief Complaint   Patient presents with    Other     patient ref by fernando Mckeon        History of Present Illness: We are asked to see this patient in consultation by Dr. Laura Melo regarding bilateral claudication. Ernestina Mesa is a 66 y.o. male who has a several year history of worsening claudication, particularly of the left. He reports pain in the hip /thigh and buttocks that develops soon after walking. Pain progresses to include cramping of the calf. He does get similar symptoms on right but with delayed onset and decreased severity. He did undergo a CTA last   September that did show Iliac artery stenosis but treatment/referral delayed due to additional findings of bladder CA. He has now undergone multiple cystoscopies with [facility] treatments. Past Medical History:  Past Medical History:   Diagnosis Date    CAD (coronary artery disease)     Cancer ([facility] [address]     BLADDER    COPD (chronic obstructive pulmonary disease) ([facility] [address] [facility]     Depression     ADJUSTMENT DISORDER    Heart attack ([facility] [address] 10/10    History of renal calculi     Hyperlipidemia     Hypertension     Hyperthyroidism     RBBB        Past Surgical History:  Past Surgical History:   Procedure Laterality Date    CARDIAC SURGERY  2010    2 stents    COLONOSCOPY  5/2/14    colonoscopy and egd    CYSTOSCOPY      CYSTOSCOPY Left 10/22/2021    CYSTOSCOPY, TRANSURETHRAL RESECTION OF BLADDER TUMOR, LEFT STENT REMOVAL performed by Katharina Saini MD at [address] Medications:   Prior to Admission medications    Medication Sig Start Date End Date Taking?  Authorizing Provider   methIMAzole (TAPAZOLE) 5 MG tablet Take one tab daily 6/20/22 12/11/22 Yes CHRIS Mcgrath   moexipril [facility]) 15 MG tablet Take 1.5 tab by mouth daily 5/18/22  Yes NEWTON Isaac - CNP   sertraline (ZOLOFT) 100 MG tablet Take 1 tablet by mouth once daily 4/14/22  Yes Ayden Cool APRN - CNP   oxybutynin (DITROPAN) 5 MG tablet TAKE 1 TABLET BY MOUTH TWICE DAILY 1/5/22  Yes Historical Provider, MD   tamsulosin (FLOMAX) 0.4 MG capsule TAKE 1 CAPSULE BY MOUTH IN THE EVENING 12/29/21  Yes Historical Provider, MD   atorvastatin (LIPITOR) 80 MG tablet Take 1 tablet by mouth once daily 1/15/22  Yes Rayo Roach APRN - AMRIANO   fluticasone-salmeterol (ADVAIR DISKUS) 250-50 MCG/DOSE AEPB Inhale 1 puff into the lungs every 12 hours 6/22/21  Yes Godfrey Mejia DO   metoprolol (TOPROL-XL) 25 MG XL tablet Take 25 mg by mouth daily. Yes Historical Provider, MD   aspirin 81 MG tablet Take 81 mg by mouth nightly otc   Yes Historical Provider, MD        Allergies:  Penicillins      Social History:      Social History     Socioeconomic History    Marital status:      Spouse name: Not on file    Number of children: Not on file    Years of education: Not on file    Highest education level: Not on file   Occupational History    Not on file   Tobacco Use    Smoking status: Former Smoker     Packs/day: 1.00     Years: 20.00     Pack years: 20.00     Types: Cigarettes     Quit date: 1/27/2019     Years since quitting: 3.4    Smokeless tobacco: Never Used   Vaping Use    Vaping Use: Not on file   Substance and Sexual Activity    Alcohol use: No    Drug use: No    Sexual activity: Not on file   Other Topics Concern    Not on file   Social History Narrative    Not on file     Social Determinants of Health     Financial Resource Strain: Low Risk     Difficulty of Paying Living Expenses: Not hard at all   Food Insecurity: No Food Insecurity    Worried About [address] in the Last Year: Never true    [address] [employer] in the Last Year: Never true   Transportation Needs: No Transportation Needs    Lack of Transportation (Medical): No    Lack of Transportation (Non-Medical):  No Physical Activity:     Days of Exercise per Week: Not on file    Minutes of Exercise per Session: Not on file   Stress:     Feeling of Stress : Not on file   Social Connections:     Frequency of Communication with Friends and Family: Not on file    Frequency of Social Gatherings with Friends and Family: Not on file    Attends Rastafari Services: Not on file    Active Member of [address] [place] [address] [employer] or Organizations: Not on file    Attends Club or Organization Meetings: Not on file    Marital Status: Not on file   Intimate Partner Violence:     Fear of Current or Ex-Partner: Not on file    Emotionally Abused: Not on file    Physically Abused: Not on file    Sexually Abused: Not on file   Housing Stability:     Unable to Pay for Housing in the Last Year: Not on file    Number of [place] in the Last Year: Not on file    Unstable Housing in the Last Year: Not on file       Family History:        Problem Relation Age of Onset    Hearing Loss Mother     Heart Disease Mother     High Blood Pressure Mother     High Cholesterol Mother     Heart Disease Father     High Blood Pressure Father     High Cholesterol Father     Arthritis Sister     High Cholesterol Sister     Stroke Maternal Aunt        Review of Systems:  A 14 point review of systems was completed. Pertinent positives identified in the HPI, all other review of systems negative. Physical Examination:    BP (!) 130/58 (Site: Right Upper Arm)   Ht 5' 8\" (1.727 m)   Wt 194 lb (88 kg)   BMI 29.50 kg/m²     Weight: 194 lb (88 kg)       General appearance: NAD  Eyes: PERRLA  Neck: no JVD, no lymphadenopathy. Respiratory: effort is unlabored, no crackles, wheezes or rubs. Cardiovascular: regular, no murmur. No carotid bruits. No edema or varicosities. Pulses:    femoral DP PT   RIGHT 1 1 doppler   LEFT - doppler doppler   GI: abdomen soft, nondistended, no organomegaly.   Musculoskeletal: strength and tone normal.  Extremities: warm and pink.  Skin: no dermatitis or ulceration. Neuro/psychiatric: grossly intact. MEDICAL DECISION MAKING/TESTING      Arterial duplex:    Right   The right ankle/brachial index is 0.63 (the DP is 65 and the PT is 70 mmHg). The common femoral artery demonstrates multiphasic flow indicating no   significant aortic-iliac inflow disease. There is atherosclerotic plaque seen within the common femoral artery,   proximal - distal superficial femoral artery and popliteal artery consistent   with < 50% stenosis. There is abnormal monophasic flow demonstrated in the proximal - distal   femoral artery, popliteal artery, posterior tibial artery, peroneal artery and   anterior tibial artery. The calf arteries are patent. Left   The left ankle/brachial index is 0.36 (the DP is 40 and the PT is 36 mmHg). The common femoral artery demonstrates abnormal monophasic flow indicating   significant aortic-iliac inflow disease. There is atherosclerotic plaque seen within the common femoral artery,   proximal - distal superficial femoral artery and popliteal artery consistent   with < 50% stenosis. The calf arteries are patent. Previous exam on 09/04/2020, right HARRIS: 0.8 left HARRIS: 0.5. Assessment:      Diagnosis Orders   1. Severe claudication (HCC)       Quite likely that iliac stenosis has progressed to occlusion. Recommendations/Plan: Aortogram with BLE angiogram possible LLE intervention. I have discussed all risks (including but not limited to bleeding, thrombosis, contrast allergy, renal failure, and early and late failure of intervention), benefits and alternatives of catheter-based angiography. Patient freely consents and is eager to proceed. All questions and expectations addressed.       Lise Martinez MD, FACS

## 2022-07-12 ENCOUNTER — HOSPITAL ENCOUNTER (OUTPATIENT)
Dept: CARDIAC CATH/INVASIVE PROCEDURES | Age: 72
Discharge: HOME | End: 2022-07-12
Attending: SURGERY | Admitting: SURGERY
Payer: MEDICARE

## 2022-07-12 VITALS — WEIGHT: 190.3 LBS | BODY MASS INDEX: 28.94 KG/M2

## 2022-07-12 LAB
ANION GAP SERPL CALCULATED.3IONS-SCNC: 13 MMOL/L (ref 3–16)
BUN BLDV-MCNC: 37 MG/DL (ref 7–20)
CALCIUM SERPL-MCNC: 10.1 MG/DL (ref 8.3–10.6)
CHLORIDE BLD-SCNC: 101 MMOL/L (ref 99–110)
CO2: 24 MMOL/L (ref 21–32)
CREAT SERPL-MCNC: 1.9 MG/DL (ref 0.8–1.3)
GFR AFRICAN AMERICAN: 42
GFR NON-AFRICAN AMERICAN: 35
GLUCOSE BLD-MCNC: 137 MG/DL (ref 70–99)
HCT VFR BLD CALC: 42.3 % (ref 40.5–52.5)
HEMOGLOBIN: 13.6 G/DL (ref 13.5–17.5)
MCH RBC QN AUTO: 25.2 PG (ref 26–34)
MCHC RBC AUTO-ENTMCNC: 32.2 G/DL (ref 31–36)
MCV RBC AUTO: 78.3 FL (ref 80–100)
PDW BLD-RTO: 18.5 % (ref 12.4–15.4)
PLATELET # BLD: 169 K/UL (ref 135–450)
PMV BLD AUTO: 8.1 FL (ref 5–10.5)
POTASSIUM REFLEX MAGNESIUM: 4.1 MMOL/L (ref 3.5–5.1)
RBC # BLD: 5.4 M/UL (ref 4.2–5.9)
SODIUM BLD-SCNC: 138 MMOL/L (ref 136–145)
WBC # BLD: 6.6 K/UL (ref 4–11)

## 2022-07-12 PROCEDURE — C1725 CATH, TRANSLUMIN NON-LASER: HCPCS

## 2022-07-12 PROCEDURE — C1876 STENT, NON-COA/NON-COV W/DEL: HCPCS

## 2022-07-12 PROCEDURE — C1769 GUIDE WIRE: HCPCS

## 2022-07-12 PROCEDURE — C1894 INTRO/SHEATH, NON-LASER: HCPCS

## 2022-07-12 PROCEDURE — 75625 CONTRAST EXAM ABDOMINL AORTA: CPT | Performed by: SURGERY

## 2022-07-12 PROCEDURE — 75716 ARTERY X-RAYS ARMS/LEGS: CPT

## 2022-07-12 PROCEDURE — 6360000004 HC RX CONTRAST MEDICATION

## 2022-07-12 PROCEDURE — C1887 CATHETER, GUIDING: HCPCS

## 2022-07-12 PROCEDURE — 37224 HC FEM POP TERRITORY PLASTY: CPT

## 2022-07-12 PROCEDURE — 80048 BASIC METABOLIC PNL TOTAL CA: CPT

## 2022-07-12 PROCEDURE — 2500000003 HC RX 250 WO HCPCS

## 2022-07-12 PROCEDURE — 75625 CONTRAST EXAM ABDOMINL AORTA: CPT

## 2022-07-12 PROCEDURE — 6370000000 HC RX 637 (ALT 250 FOR IP)

## 2022-07-12 PROCEDURE — 37224 PR REVSC OPN/PRG FEM/POP W/ANGIOPLASTY UNI: CPT | Performed by: SURGERY

## 2022-07-12 PROCEDURE — 37221 HC ILIAC TERRITORY PLASTY STENT: CPT

## 2022-07-12 PROCEDURE — 99152 MOD SED SAME PHYS/QHP 5/>YRS: CPT | Performed by: SURGERY

## 2022-07-12 PROCEDURE — 75716 ARTERY X-RAYS ARMS/LEGS: CPT | Performed by: SURGERY

## 2022-07-12 PROCEDURE — C1760 CLOSURE DEV, VASC: HCPCS

## 2022-07-12 PROCEDURE — 75774 ARTERY X-RAY EACH VESSEL: CPT

## 2022-07-12 PROCEDURE — C2623 CATH, TRANSLUMIN, DRUG-COAT: HCPCS

## 2022-07-12 PROCEDURE — 37221 PR REVSC OPN/PRQ ILIAC ART W/STNT PLMT & ANGIOPLSTY: CPT | Performed by: SURGERY

## 2022-07-12 PROCEDURE — 2709999900 HC NON-CHARGEABLE SUPPLY

## 2022-07-12 PROCEDURE — 85027 COMPLETE CBC AUTOMATED: CPT

## 2022-07-12 PROCEDURE — 6360000002 HC RX W HCPCS

## 2022-07-12 PROCEDURE — 76937 US GUIDE VASCULAR ACCESS: CPT | Performed by: SURGERY

## 2022-07-12 RX ORDER — MIDAZOLAM HYDROCHLORIDE 1 MG/ML
INJECTION INTRAMUSCULAR; INTRAVENOUS
Status: COMPLETED | OUTPATIENT
Start: 2022-07-12 | End: 2022-07-12

## 2022-07-12 RX ORDER — FENTANYL CITRATE 50 UG/ML
INJECTION, SOLUTION INTRAMUSCULAR; INTRAVENOUS
Status: COMPLETED | OUTPATIENT
Start: 2022-07-12 | End: 2022-07-12

## 2022-07-12 RX ORDER — CLOPIDOGREL BISULFATE 75 MG/1
75 TABLET ORAL DAILY
Qty: 30 TABLET | Refills: 3 | Status: SHIPPED | OUTPATIENT
Start: 2022-07-13

## 2022-07-12 RX ORDER — HEPARIN SODIUM 1000 [USP'U]/ML
INJECTION, SOLUTION INTRAVENOUS; SUBCUTANEOUS
Status: COMPLETED | OUTPATIENT
Start: 2022-07-12 | End: 2022-07-12

## 2022-07-12 RX ORDER — CLOPIDOGREL BISULFATE 75 MG/1
150 TABLET ORAL ONCE
Status: COMPLETED | OUTPATIENT
Start: 2022-07-12 | End: 2022-07-12

## 2022-07-12 RX ORDER — CLOPIDOGREL BISULFATE 75 MG/1
75 TABLET ORAL DAILY
Status: DISCONTINUED | OUTPATIENT
Start: 2022-07-13 | End: 2022-07-12 | Stop reason: HOSPADM

## 2022-07-12 RX ADMIN — FENTANYL CITRATE 50 MCG: 50 INJECTION, SOLUTION INTRAMUSCULAR; INTRAVENOUS at 10:23

## 2022-07-12 RX ADMIN — MIDAZOLAM HYDROCHLORIDE 2 MG: 1 INJECTION INTRAMUSCULAR; INTRAVENOUS at 10:23

## 2022-07-12 RX ADMIN — CLOPIDOGREL BISULFATE 150 MG: 75 TABLET ORAL at 11:32

## 2022-07-12 RX ADMIN — HEPARIN SODIUM 5000 UNITS: 1000 INJECTION, SOLUTION INTRAVENOUS; SUBCUTANEOUS at 10:34

## 2022-07-12 NOTE — H&P
History and Physical / Pre-Sedation Assessment    Patient:  Geovanni Carrasco  :   1950    Intended Procedure: Aortogram with BLE angiogram, possible intervention      HPI: Severe LLE claudication, moderate RLE  Nurses notes reviewed and agreed. Medications reviewed  Allergies:   Penicillins        Physical Exam:   CURRENT VITALS:  There were no vitals taken for this visit. Airway:Normal  Cardiac:Normal  Pulmonary:Normal  Abdomen:Normal        Pre-Procedure Assessment/Plan:  ASA 2 - Patient with mild systemic disease with no functional limitations    Mallampati Airway Assessment:  Mallampati Class II - (soft palate, fauces & uvula are visible)    Level of Sedation Plan: Moderate sedation    Post Procedure plan: Return to same level of care    I assessed the patient and find that the patient is in satisfactory condition to proceed with the planned procedure and sedation plan. I have explained the risk, benefits, and alternatives to the procedure. The patient understands and agrees to proceed.   Yes    James Cuellar

## 2022-07-12 NOTE — PROGRESS NOTES
Vascular    Aortogram with BLE angiogram, Stenting L Iliac and Angioplasty L Pop performed. Note dictated.

## 2022-07-13 NOTE — OP NOTE
[facility] [place] [facility]\"" [address]                                OPERATIVE REPORT    PATIENT NAME: Eligio Recinos                        :        1950  MED REC NO:   [id number]                          ROOM:  ACCOUNT NO:   [de-identified]                           ADMIT DATE: 2022  PROVIDER:     Bettina Lopez MD    DATE OF PROCEDURE:  2022    PREOPERATIVE DIAGNOSIS:  Severe left lower extremity claudication with  moderate symptoms in the right lower extremity. POSTOPERATIVE DIAGNOSIS:  Severe left lower extremity claudication with  moderate symptoms in the right lower extremity. PROCEDURES PERFORMED:  1. Ultrasound-guided right femoral venous access. 2.  Insertion of catheter in the aorta. 3.  Abdominal aortogram.  4.  Bilateral lower extremity angiograms. 5.  Angioplasty of high-grade left popliteal artery stenosis. 6.  Angioplasty and stenting of the left common iliac artery stenosis. SURGEON:  Bettina Lopez MD    ANESTHESIA:  Local with moderate monitored sedation. INDICATIONS:  The patient is a 77-year-old male with a significant  disabling left lower extremity claudication. He is brought to the angio  suite at this time to undergo angiography with possible intervention. OPERATIVE PROCEDURE:  The patient is brought to the angio suite and  placed in the supine position. Under my direct supervision, Versed and  fentanyl were administered for moderate sedation. The patient was  monitored by an independent trained nurse observer using continuous  blood pressure, EKG and pulse oximetry. I spent approximately 52  minutes face-to-face with the patient providing and directing sedation. Ultrasound was used to identify the right common femoral artery. This  was patent and pulsatile. Under direct visualization, this was accessed  and a 5-Moroccan introducer sheath was placed.   Bentson wire was advanced  into the

## 2022-08-05 ENCOUNTER — OFFICE VISIT (OUTPATIENT)
Dept: VASCULAR SURGERY | Age: 72
End: 2022-08-05

## 2022-08-05 VITALS
BODY MASS INDEX: 28.79 KG/M2 | WEIGHT: 190 LBS | HEIGHT: 68 IN | SYSTOLIC BLOOD PRESSURE: 120 MMHG | DIASTOLIC BLOOD PRESSURE: 60 MMHG

## 2022-08-05 DIAGNOSIS — Z09 POSTOPERATIVE EXAMINATION: Primary | ICD-10-CM

## 2022-08-05 PROCEDURE — 99024 POSTOP FOLLOW-UP VISIT: CPT | Performed by: SURGERY

## 2022-08-08 ENCOUNTER — TELEPHONE (OUTPATIENT)
Dept: FAMILY MEDICINE CLINIC | Age: 72
End: 2022-08-08

## 2022-08-29 RX ORDER — SERTRALINE HYDROCHLORIDE 100 MG/1
TABLET, FILM COATED ORAL
Qty: 90 TABLET | Refills: 0 | Status: SHIPPED | OUTPATIENT
Start: 2022-08-29

## 2022-09-14 ENCOUNTER — TELEPHONE (OUTPATIENT)
Dept: FAMILY MEDICINE CLINIC | Age: 72
End: 2022-09-14

## 2022-10-03 ENCOUNTER — OFFICE VISIT (OUTPATIENT)
Dept: FAMILY MEDICINE CLINIC | Age: 72
End: 2022-10-03
Payer: MEDICARE

## 2022-10-03 VITALS
HEART RATE: 60 BPM | SYSTOLIC BLOOD PRESSURE: 118 MMHG | BODY MASS INDEX: 27.67 KG/M2 | DIASTOLIC BLOOD PRESSURE: 50 MMHG | OXYGEN SATURATION: 94 % | WEIGHT: 182 LBS

## 2022-10-03 DIAGNOSIS — E04.1 THYROID NODULE: ICD-10-CM

## 2022-10-03 DIAGNOSIS — Z01.818 PREOP EXAMINATION: ICD-10-CM

## 2022-10-03 DIAGNOSIS — Z01.818 PREOP EXAMINATION: Primary | ICD-10-CM

## 2022-10-03 DIAGNOSIS — E05.90 HYPERTHYROIDISM: ICD-10-CM

## 2022-10-03 PROCEDURE — G8484 FLU IMMUNIZE NO ADMIN: HCPCS | Performed by: NURSE PRACTITIONER

## 2022-10-03 PROCEDURE — G8417 CALC BMI ABV UP PARAM F/U: HCPCS | Performed by: NURSE PRACTITIONER

## 2022-10-03 PROCEDURE — 1123F ACP DISCUSS/DSCN MKR DOCD: CPT | Performed by: NURSE PRACTITIONER

## 2022-10-03 PROCEDURE — 3017F COLORECTAL CA SCREEN DOC REV: CPT | Performed by: NURSE PRACTITIONER

## 2022-10-03 PROCEDURE — 99214 OFFICE O/P EST MOD 30 MIN: CPT | Performed by: NURSE PRACTITIONER

## 2022-10-03 PROCEDURE — 1036F TOBACCO NON-USER: CPT | Performed by: NURSE PRACTITIONER

## 2022-10-03 PROCEDURE — G8427 DOCREV CUR MEDS BY ELIG CLIN: HCPCS | Performed by: NURSE PRACTITIONER

## 2022-10-03 RX ORDER — METHIMAZOLE 5 MG/1
TABLET ORAL
Qty: 90 TABLET | Refills: 1 | Status: SHIPPED | OUTPATIENT
Start: 2022-10-03 | End: 2023-03-26

## 2022-10-03 ASSESSMENT — ANXIETY QUESTIONNAIRES
2. NOT BEING ABLE TO STOP OR CONTROL WORRYING: 0
7. FEELING AFRAID AS IF SOMETHING AWFUL MIGHT HAPPEN: 0
6. BECOMING EASILY ANNOYED OR IRRITABLE: 0
GAD7 TOTAL SCORE: 0
1. FEELING NERVOUS, ANXIOUS, OR ON EDGE: 0
3. WORRYING TOO MUCH ABOUT DIFFERENT THINGS: 0
4. TROUBLE RELAXING: 0
IF YOU CHECKED OFF ANY PROBLEMS ON THIS QUESTIONNAIRE, HOW DIFFICULT HAVE THESE PROBLEMS MADE IT FOR YOU TO DO YOUR WORK, TAKE CARE OF THINGS AT HOME, OR GET ALONG WITH OTHER PEOPLE: NOT DIFFICULT AT ALL
5. BEING SO RESTLESS THAT IT IS HARD TO SIT STILL: 0

## 2022-10-03 ASSESSMENT — PATIENT HEALTH QUESTIONNAIRE - PHQ9
4. FEELING TIRED OR HAVING LITTLE ENERGY: 0
6. FEELING BAD ABOUT YOURSELF - OR THAT YOU ARE A FAILURE OR HAVE LET YOURSELF OR YOUR FAMILY DOWN: 0
SUM OF ALL RESPONSES TO PHQ9 QUESTIONS 1 & 2: 0
1. LITTLE INTEREST OR PLEASURE IN DOING THINGS: 0
2. FEELING DOWN, DEPRESSED OR HOPELESS: 0
SUM OF ALL RESPONSES TO PHQ QUESTIONS 1-9: 0
5. POOR APPETITE OR OVEREATING: 0
9. THOUGHTS THAT YOU WOULD BE BETTER OFF DEAD, OR OF HURTING YOURSELF: 0
8. MOVING OR SPEAKING SO SLOWLY THAT OTHER PEOPLE COULD HAVE NOTICED. OR THE OPPOSITE, BEING SO FIGETY OR RESTLESS THAT YOU HAVE BEEN MOVING AROUND A LOT MORE THAN USUAL: 0
7. TROUBLE CONCENTRATING ON THINGS, SUCH AS READING THE NEWSPAPER OR WATCHING TELEVISION: 0
3. TROUBLE FALLING OR STAYING ASLEEP: 0
SUM OF ALL RESPONSES TO PHQ QUESTIONS 1-9: 0

## 2022-10-03 NOTE — PROGRESS NOTES
Preoperative Consultation      Trice Mayen  YOB: 1950    Date of Service:  10/3/2022    Vitals:    10/03/22 1302   BP: (!) 118/50   Site: Right Upper Arm   Position: Sitting   Cuff Size: Large Adult   Pulse: 60   SpO2: 94%   Weight: 182 lb (82.6 kg)      Wt Readings from Last 2 Encounters:   10/03/22 182 lb (82.6 kg)   08/05/22 190 lb (86.2 kg)     BP Readings from Last 3 Encounters:   10/03/22 (!) 118/50   08/05/22 120/60   07/08/22 (!) 130/58        Chief Complaint   Patient presents with    Pre-op Exam     Pre op exam for urology check. Allergies   Allergen Reactions    Penicillins Other (See Comments)     UNKNOWN, CHILD     Outpatient Medications Marked as Taking for the 10/3/22 encounter (Office Visit) with NEWTON Sanchez CNP   Medication Sig Dispense Refill    methIMAzole (TAPAZOLE) 5 MG tablet Take one tab daily 90 tablet 1    sertraline (ZOLOFT) 100 MG tablet Take 1 tablet by mouth once daily 90 tablet 0    clopidogrel (PLAVIX) 75 MG tablet Take 1 tablet by mouth daily 30 tablet 3    moexipril (UNIVASC) 15 MG tablet Take 1.5 tab by mouth daily 135 tablet 1    oxybutynin (DITROPAN) 5 MG tablet TAKE 1 TABLET BY MOUTH TWICE DAILY      tamsulosin (FLOMAX) 0.4 MG capsule TAKE 1 CAPSULE BY MOUTH IN THE EVENING      atorvastatin (LIPITOR) 80 MG tablet Take 1 tablet by mouth once daily 90 tablet 3    fluticasone-salmeterol (ADVAIR DISKUS) 250-50 MCG/DOSE AEPB Inhale 1 puff into the lungs every 12 hours 180 each 3    metoprolol (TOPROL-XL) 25 MG XL tablet Take 25 mg by mouth daily. aspirin 81 MG tablet Take 81 mg by mouth nightly otc         This patient presents to the office today for a preoperative consultation at the request of surgeon, Dr. Gene Lowry, who plans on performing Cystoscopy on October 12 at [facility]. The current problem began 1 year ago, and symptoms have been unchanged with time. Conservative therapy: N/A.     Planned anesthesia: General   Known anesthesia problems: None   Bleeding risk: No recent or remote history of abnormal bleeding  Personal or FH of DVT/PE: No    Patient objection to receiving blood products: No    Patient Active Problem List   Diagnosis    Hyperlipidemia    Depression    Hypertension    CAD (coronary artery disease)    History of renal calculi    Tobacco dependence    Vitamin D deficiency    Esophageal thickening    Hyperthyroidism    Elevated glucose    Thyroid nodule    Prediabetes    Pulmonary emphysema, unspecified emphysema type (HCC)    Claudication in peripheral vascular disease (HCC)    Malignant neoplasm of urinary bladder, unspecified site [facility])       Past Medical History:   Diagnosis Date    CAD (coronary artery disease)     Cancer ([facility] [address]     BLADDER    COPD (chronic obstructive pulmonary disease) ([facility] [address]     Depression     ADJUSTMENT DISORDER    Heart attack ([facility] [address] 10/10    History of renal calculi     Hyperlipidemia     Hypertension     Hyperthyroidism     RBBB      Past Surgical History:   Procedure Laterality Date    CARDIAC SURGERY  2010    2 stents    COLONOSCOPY  5/2/14    colonoscopy and egd    CYSTOSCOPY      CYSTOSCOPY Left 10/22/2021    CYSTOSCOPY, TRANSURETHRAL RESECTION OF BLADDER TUMOR, LEFT STENT REMOVAL performed by [facility]dan Turner MD at [facility] History   Problem Relation Age of Onset    Hearing Loss Mother     Heart Disease Mother     High Blood Pressure Mother     High Cholesterol Mother     Heart Disease Father     High Blood Pressure Father     High Cholesterol Father     Arthritis Sister     High Cholesterol Sister     Stroke Maternal Aunt      Social History     Socioeconomic History    Marital status:      Spouse name: Not on file    Number of children: Not on file    Years of education: Not on file    Highest education level: Not on file   Occupational History    Not on file   Tobacco Use    Smoking status: Former     Packs/day: 1.00     Years: 20.00     Pack years: 20.00 Types: Cigarettes     Quit date: 1/27/2019     Years since quitting: 3.6    Smokeless tobacco: Never   Vaping Use    Vaping Use: Not on file   Substance and Sexual Activity    Alcohol use: No    Drug use: No    Sexual activity: Not on file   Other Topics Concern    Not on file   Social History Narrative    Not on file     Social Determinants of Health     Financial Resource Strain: Not on file   Food Insecurity: Not on file   Transportation Needs: Not on file   Physical Activity: Unknown    Days of Exercise per Week: 0 days    Minutes of Exercise per Session: Not on file   Stress: Not on file   Social Connections: Not on file   Intimate Partner Violence: Not on file   Housing Stability: Not on file       Review of Systems  A comprehensive review of systems was negative except for what was noted in the HPI. Physical Exam   Constitutional: He is oriented to person, place, and time. He appears well-developed and well-nourished. No distress. HENT:   Head: Normocephalic and atraumatic. Mouth/Throat: Uvula is midline, oropharynx is clear and moist and mucous membranes are normal.   Eyes: Conjunctivae and EOM are normal. Pupils are equal, round, and reactive to light. Neck: Trachea normal and normal range of motion. Neck supple. No JVD present. Carotid bruit is not present. No mass and no thyromegaly present. Cardiovascular: Normal rate, regular rhythm, normal heart sounds and intact distal pulses. Exam reveals no gallop and no friction rub. No murmur heard. Pulmonary/Chest: Effort normal and breath sounds normal. No respiratory distress. He has no wheezes. He has no rales. Abdominal: Soft. bowel sounds are normal. He exhibits no distension and no mass. There is no hepatosplenomegaly. No tenderness. Musculoskeletal: He exhibits no edema and no tenderness. Neurological: He is alert and oriented to person, place, and time. He has normal strength. No cranial nerve deficit or sensory deficit. Coordination and gait normal.   Skin: Skin is warm and dry. No rash noted. No erythema. Psychiatric: He has a normal mood and affect. His behavior is normal.     EKG Interpretation:Will need Cardiac Clearance from cardiology-[facility] 9/8/22. Lab Review   Lab Results   Component Value Date/Time     10/03/2022 02:21 PM    K 4.9 10/05/2022 03:22 PM    K 4.1 07/12/2022 09:18 AM     10/03/2022 02:21 PM    CO2 25 10/03/2022 02:21 PM    BUN 17 10/03/2022 02:21 PM    CREATININE 1.3 10/03/2022 02:21 PM    GLUCOSE 104 10/03/2022 02:21 PM    CALCIUM 10.4 10/03/2022 02:21 PM     Lab Results   Component Value Date/Time    CKTOTAL 69 04/08/2014 11:28 AM    CKMB 0.29 02/09/2011 08:40 AM    CKMB 3.51 10/26/2010 05:56 AM    CKMBINDEX not calc 10/26/2010 05:56 AM    TROPONINI 0.010 01/02/2014 10:33 AM     Lab Results   Component Value Date/Time    WBC 5.8 10/03/2022 02:21 PM    HGB 13.6 10/03/2022 02:21 PM    HCT 42.9 10/03/2022 02:21 PM    MCV 80.9 10/03/2022 02:21 PM     10/03/2022 02:21 PM     Lab Results   Component Value Date/Time    CHOL 170 10/05/2022 03:22 PM    TRIG 176 10/05/2022 03:22 PM    HDL 39 10/05/2022 03:22 PM    HDL 42 02/09/2011 01:28 PM           Assessment:       74 y.o. patient with planned surgery as above. Known risk factors for perioperative complications: Coronary artery disease, COPD, Hypertension, Peripheral vascular disease- claudication-stents, Renal dysfunction  HTN, CAD, pulmonary emphysema, Former Tobacco use-quit 3 years ago. Current medications which may produce withdrawal symptoms if withheld perioperatively: none      Plan:     1. Preoperative workup as follows: hemoglobin, hematocrit, electrolytes, creatinine, glucose  2. Change in medication regimen before surgery: Discontinue ASA 7 days before surgery, Discontinue NSAIDs 7 days before surgery  3.  Prophylaxis for cardiac events with perioperative beta-blockers:   ACC/AHA indications for pre-operative beta-blocker use:  Not indicated  Vascular surgery with history of postitive stress test  Intermediate or high risk surgery with history of CAD   Intermediate or high risk surgery with multiple clinical predictors of CAD- 2 of the following: history of compensated or prior heart failure, history of cerebrovascular disease, DM, or renal insufficiency    Routine administration of higher-dose, long-acting metoprolol in beta-blocker-naïve patients on the day of surgery, and in the absence of dose titration is associated with an overall increase in mortality. Beta-blockers should be started days to weeks prior to surgery and titrated to pulse < 70.  4. Deep vein thrombosis prophylaxis: regimen to be chosen by surgical team  5. No contraindications to planned surgery  Recommend cardiac clearance.

## 2022-10-04 LAB
ANION GAP SERPL CALCULATED.3IONS-SCNC: 12 MMOL/L (ref 3–16)
BUN BLDV-MCNC: 17 MG/DL (ref 7–20)
CALCIUM SERPL-MCNC: 10.4 MG/DL (ref 8.3–10.6)
CHLORIDE BLD-SCNC: 104 MMOL/L (ref 99–110)
CO2: 25 MMOL/L (ref 21–32)
CREAT SERPL-MCNC: 1.3 MG/DL (ref 0.8–1.3)
GFR AFRICAN AMERICAN: >60
GFR NON-AFRICAN AMERICAN: 54
GLUCOSE BLD-MCNC: 104 MG/DL (ref 70–99)
HCT VFR BLD CALC: 42.9 % (ref 40.5–52.5)
HEMOGLOBIN: 13.6 G/DL (ref 13.5–17.5)
MCH RBC QN AUTO: 25.7 PG (ref 26–34)
MCHC RBC AUTO-ENTMCNC: 31.8 G/DL (ref 31–36)
MCV RBC AUTO: 80.9 FL (ref 80–100)
PDW BLD-RTO: 18.2 % (ref 12.4–15.4)
PLATELET # BLD: 230 K/UL (ref 135–450)
PMV BLD AUTO: 8.4 FL (ref 5–10.5)
POTASSIUM SERPL-SCNC: 5.9 MMOL/L (ref 3.5–5.1)
RBC # BLD: 5.31 M/UL (ref 4.2–5.9)
SODIUM BLD-SCNC: 141 MMOL/L (ref 136–145)
T3 FREE: 3 PG/ML (ref 2.3–4.2)
T4 FREE: 0.8 NG/DL (ref 0.9–1.8)
TSH SERPL DL<=0.05 MIU/L-ACNC: 2.09 UIU/ML (ref 0.27–4.2)
WBC # BLD: 5.8 K/UL (ref 4–11)

## 2022-10-05 ENCOUNTER — TELEPHONE (OUTPATIENT)
Dept: FAMILY MEDICINE CLINIC | Age: 72
End: 2022-10-05

## 2022-10-05 DIAGNOSIS — E87.5 HYPERKALEMIA: ICD-10-CM

## 2022-10-05 DIAGNOSIS — Z12.5 SCREENING FOR PROSTATE CANCER: ICD-10-CM

## 2022-10-05 DIAGNOSIS — E87.5 HYPERKALEMIA: Primary | ICD-10-CM

## 2022-10-05 DIAGNOSIS — E78.2 MIXED HYPERLIPIDEMIA: Primary | ICD-10-CM

## 2022-10-05 DIAGNOSIS — E78.2 MIXED HYPERLIPIDEMIA: ICD-10-CM

## 2022-10-05 LAB
CHOLESTEROL, TOTAL: 170 MG/DL (ref 0–199)
HDLC SERPL-MCNC: 39 MG/DL (ref 40–60)
LDL CHOLESTEROL CALCULATED: 96 MG/DL
POTASSIUM SERPL-SCNC: 4.9 MMOL/L (ref 3.5–5.1)
PROSTATE SPECIFIC ANTIGEN: 3.43 NG/ML (ref 0–4)
TRIGL SERPL-MCNC: 176 MG/DL (ref 0–150)
VLDLC SERPL CALC-MCNC: 35 MG/DL

## 2022-10-19 DIAGNOSIS — E05.90 HYPERTHYROIDISM: Primary | ICD-10-CM

## 2022-10-19 DIAGNOSIS — E04.1 THYROID NODULE: ICD-10-CM

## 2022-11-13 SDOH — HEALTH STABILITY: PHYSICAL HEALTH: ON AVERAGE, HOW MANY DAYS PER WEEK DO YOU ENGAGE IN MODERATE TO STRENUOUS EXERCISE (LIKE A BRISK WALK)?: 0 DAYS

## 2022-11-13 ASSESSMENT — LIFESTYLE VARIABLES
HOW OFTEN DO YOU HAVE A DRINK CONTAINING ALCOHOL: 1
HOW MANY STANDARD DRINKS CONTAINING ALCOHOL DO YOU HAVE ON A TYPICAL DAY: 0
HOW MANY STANDARD DRINKS CONTAINING ALCOHOL DO YOU HAVE ON A TYPICAL DAY: PATIENT DOES NOT DRINK
HOW OFTEN DO YOU HAVE SIX OR MORE DRINKS ON ONE OCCASION: 1
HOW OFTEN DO YOU HAVE A DRINK CONTAINING ALCOHOL: NEVER

## 2022-11-13 ASSESSMENT — PATIENT HEALTH QUESTIONNAIRE - PHQ9
SUM OF ALL RESPONSES TO PHQ QUESTIONS 1-9: 0
1. LITTLE INTEREST OR PLEASURE IN DOING THINGS: 0
SUM OF ALL RESPONSES TO PHQ QUESTIONS 1-9: 0
SUM OF ALL RESPONSES TO PHQ9 QUESTIONS 1 & 2: 0
2. FEELING DOWN, DEPRESSED OR HOPELESS: 0

## 2022-11-14 ENCOUNTER — OFFICE VISIT (OUTPATIENT)
Dept: FAMILY MEDICINE CLINIC | Age: 72
End: 2022-11-14

## 2022-11-14 VITALS
OXYGEN SATURATION: 94 % | HEIGHT: 68 IN | TEMPERATURE: 98.1 F | DIASTOLIC BLOOD PRESSURE: 60 MMHG | WEIGHT: 188 LBS | HEART RATE: 69 BPM | BODY MASS INDEX: 28.49 KG/M2 | SYSTOLIC BLOOD PRESSURE: 130 MMHG

## 2022-11-14 DIAGNOSIS — Z87.891 PERSONAL HISTORY OF TOBACCO USE: ICD-10-CM

## 2022-11-14 DIAGNOSIS — Z00.00 MEDICARE ANNUAL WELLNESS VISIT, SUBSEQUENT: ICD-10-CM

## 2022-11-14 DIAGNOSIS — Z23 NEED FOR INFLUENZA VACCINATION: ICD-10-CM

## 2022-11-14 DIAGNOSIS — E11.9 TYPE 2 DIABETES MELLITUS WITHOUT COMPLICATION, UNSPECIFIED WHETHER LONG TERM INSULIN USE (HCC): Primary | ICD-10-CM

## 2022-11-14 LAB
CREATININE URINE POCT: NORMAL
HBA1C MFR BLD: 6.3 %
MICROALBUMIN/CREAT 24H UR: NORMAL MG/G{CREAT}
MICROALBUMIN/CREAT UR-RTO: NORMAL

## 2022-11-14 RX ORDER — ALBUTEROL SULFATE 90 UG/1
2 AEROSOL, METERED RESPIRATORY (INHALATION) 4 TIMES DAILY PRN
Qty: 18 G | Refills: 5 | Status: SHIPPED | OUTPATIENT
Start: 2022-11-14

## 2022-11-14 SDOH — ECONOMIC STABILITY: INCOME INSECURITY: IN THE LAST 12 MONTHS, WAS THERE A TIME WHEN YOU WERE NOT ABLE TO PAY THE MORTGAGE OR RENT ON TIME?: NO

## 2022-11-14 SDOH — ECONOMIC STABILITY: FOOD INSECURITY: WITHIN THE PAST 12 MONTHS, YOU WORRIED THAT YOUR FOOD WOULD RUN OUT BEFORE YOU GOT MONEY TO BUY MORE.: NEVER TRUE

## 2022-11-14 SDOH — ECONOMIC STABILITY: HOUSING INSECURITY
IN THE LAST 12 MONTHS, WAS THERE A TIME WHEN YOU DID NOT HAVE A STEADY PLACE TO SLEEP OR SLEPT IN A SHELTER (INCLUDING NOW)?: NO

## 2022-11-14 SDOH — ECONOMIC STABILITY: FOOD INSECURITY: WITHIN THE PAST 12 MONTHS, THE FOOD YOU BOUGHT JUST DIDN'T LAST AND YOU DIDN'T HAVE MONEY TO GET MORE.: NEVER TRUE

## 2022-11-14 SDOH — ECONOMIC STABILITY: HOUSING INSECURITY: IN THE LAST 12 MONTHS, HOW MANY PLACES HAVE YOU LIVED?: 1

## 2022-11-14 ASSESSMENT — SOCIAL DETERMINANTS OF HEALTH (SDOH): HOW HARD IS IT FOR YOU TO PAY FOR THE VERY BASICS LIKE FOOD, HOUSING, MEDICAL CARE, AND HEATING?: NOT HARD AT ALL

## 2022-11-14 NOTE — PATIENT INSTRUCTIONS
Personalized Preventive Plan for Francisco Solis - 11/14/2022  Medicare offers a range of preventive health benefits. Some of the tests and screenings are paid in full while other may be subject to a deductible, co-insurance, and/or copay. Some of these benefits include a comprehensive review of your medical history including lifestyle, illnesses that may run in your family, and various assessments and screenings as appropriate. After reviewing your medical record and screening and assessments performed today your provider may have ordered immunizations, labs, imaging, and/or referrals for you. A list of these orders (if applicable) as well as your Preventive Care list are included within your After Visit Summary for your review. Other Preventive Recommendations:    A preventive eye exam performed by an eye specialist is recommended every 1-2 years to screen for glaucoma; cataracts, macular degeneration, and other eye disorders. A preventive dental visit is recommended every 6 months. Try to get at least 150 minutes of exercise per week or 10,000 steps per day on a pedometer . Order or download the FREE \"Exercise & Physical Activity: Your Everyday Guide\" from [employer]. Call 2-266.175.4611 or search [employer] online. You need 0582-3156 mg of calcium and 5429-0652 IU of vitamin D per day. It is possible to meet your calcium requirement with diet alone, but a vitamin D supplement is usually necessary to meet this goal.  When exposed to the sun, use a sunscreen that protects against both UVA and UVB radiation with an SPF of 30 or greater. Reapply every 2 to 3 hours or after sweating, drying off with a towel, or swimming. Always wear a seat belt when traveling in a car. Always wear a helmet when riding a bicycle or motorcycle. Learning About Lung Cancer Screening  What is screening for lung cancer?      Lung cancer screening is a way to find some lung cancers early, before a person has any symptoms of the cancer. Lung cancer screening may help those who have the highest risk for lung cancer--people age 48 and older who are or were heavy smokers. For most people, who aren't at increased risk, screening for lung cancer probably isn't helpful. Screening won't prevent cancer. And it may not find all lung cancers. Lung cancer screening may lower the risk of dying from lung cancer in a small number of people. How is it done? Lung cancer screening is done with a low-dose CT (computed tomography) scan. A CT scan uses X-rays, or radiation, to make detailed pictures of your body. Experts recommend that screening be done in medical centers that focus on finding and treating lung cancer. Who is screening recommended for? Lung cancer screening is recommended for people age 48 and older who are or were heavy smokers. That means people with a smoking history of at least 20 pack years. A pack year is a way to measure how heavy a smoker you are or were. To figure out your pack years, multiply how many packs a day on average (assuming 20 cigarettes per pack) you have smoked by how many years you have smoked. For example: If you smoked 1 pack a day for 20 years, that's 1 times 20. So you have a smoking history of 20 pack years. If you smoked 2 packs a day for 10 years, that's 2 times 10. So you have a smoking history of 20 pack years. Experts agree that screening is for people who have a high risk of lung cancer. But experts don't agree on what high risk means. Some say people age 48 or older with at least a 20-pack-year smoking history are high risk. Others say it's people age 54 or older with a 30-pack-year history. To see if you could benefit from screening, first find out if you are at high risk for lung cancer. Your doctor can help you decide your lung cancer risk. What are the risks of screening? CT screening for lung cancer isn't perfect.  It can show an abnormal result when it turns out there wasn't any cancer. This is called a false-positive result. This means you may need more tests to make sure you don't have cancer. These tests can be harmful and cause a lot of worry. These tests may include more CT scans and invasive testing like a lung biopsy. In a biopsy, the doctor takes a sample of tissue from inside your lung so it can be looked at under a microscope. A biopsy is the only way to tell if you have lung cancer. If the biopsy finds cancer, you and your doctor will have to decide how or whether to treat it. Some lung cancers found on CT scans are harmless and would not have caused a problem if they had not been found through screening. But because doctors can't tell which ones will turn out to be harmless, most will be treated. This means that you may get treatment--including surgery, radiation, or chemotherapy--that you don't need. There is a risk of damage to cells or tissue from being exposed to radiation, including the small amounts used in CTs, X-rays, and other medical tests. Over time, exposure to radiation may cause cancer and other health problems. But in most cases, the risk of getting cancer from being exposed to small amounts of radiation is low. It's not a reason to avoid these tests for most people. What are the benefits of screening? Your scan may be normal (negative). For some people who are at higher risk, screening lowers the chance of dying of lung cancer. How much and how long you smoked helps to determine your risk level. Screening can find some cancers early, when treatment may be more likely to work. What happens after screening? The results of your CT scan will be sent to your doctor. Someone from your care team will explain the results of your scan and answer any questions you may have. If you need any follow-up, he or she will help you understand what to do next.   After a lung cancer screening, you can go back to your usual activities right away.  A lung cancer screening test can't tell if you have lung cancer. If your results are positive, your doctor can't tell whether an abnormal finding is a harmless nodule, cancer, or something else without doing more tests. What can you do to help prevent lung cancer? Some lung cancers can't be prevented. But if you smoke, quitting smoking is the best step you can take to prevent lung cancer. If you want to quit, your doctor can recommend medicines or other ways to help. Follow-up care is a key part of your treatment and safety. Be sure to make and go to all appointments, and call your doctor if you are having problems. It's also a good idea to know your test results and keep a list of the medicines you take. Where can you learn more? Go to [email or web address] org and sign in to your [employer] account. Enter X135 in the [employer] box to learn more about \"Learning About Lung Cancer Screening. \"     If you do not have an account, please click on the \"Sign Up Now\" link. Current as of: May 4, 2022               Content Version: 13.4  © 6716-7621 Healthwise, Incorporated. Care instructions adapted under license by [facility] ([facility]). If you have questions about a medical condition or this instruction, always ask your healthcare professional. [address] any warranty or liability for your use of this information.

## 2022-11-14 NOTE — PROGRESS NOTES
Medicare Annual Wellness Visit    Pavan Forrest is here for Medicare AWV and Diabetes    Assessment & Plan   Type 2 diabetes mellitus without complication, unspecified whether long term insulin use (HCC)  -     POCT glycosylated hemoglobin (Hb A1C) 6.3, well controlled, continue current regimen  -     POCT microalbumin  -     Diabetic Foot Exam  Need for influenza vaccination  -     Influenza, FLUAD, (age 72 y+), IM, Preservative Free, 0.5 mL  Personal history of tobacco use  -     MT VISIT TO DISCUSS LUNG CA SCREEN W LDCT  -     CT Lung Screen (Annual); Future  Medicare annual wellness visit, subsequent    Recommendations for Preventive Services Due: see orders and patient instructions/AVS.  Recommended screening schedule for the next 5-10 years is provided to the patient in written form: see Patient Instructions/AVS.     Return for Medicare Annual Wellness Visit in 1 year. Subjective   The following acute and/or chronic problems were also addressed today:  Couple of seborrheic keratoses that have popped up. Not irritable. Has not rescheduled with endocrinology. For hyperthyroid follow up. Patient's complete Health Risk Assessment and screening values have been reviewed and are found in Flowsheets. The following problems were reviewed today and where indicated follow up appointments were made and/or referrals ordered.     Positive Risk Factor Screenings with Interventions:             General Health and ACP:  General  In general, how would you say your health is?: Fair  In the past 7 days, have you experienced any of the following: New or Increased Pain, New or Increased Fatigue, Loneliness, Social Isolation, Stress or Anger?: No  Do you get the social and emotional support that you need?: Yes  Do you have a Living Will?: (!) No    Advance Directives       Power of  Living Will ACP-Advance Directive ACP-Power of     Not on File Not on File Not on File Not on hospitals Risk Interventions:  No Living Will: Patient referred to [place] Habits/Nutrition:  Physical Activity: Unknown    Days of Exercise per Week: 0 days    Minutes of Exercise per Session: Not on file     Have you lost any weight without trying in the past 3 months?: No  Body mass index: (!) 28.58  Have you seen the dentist within the past year?: (!) No  Health Habits/Nutrition Interventions:  Dental exam overdue:  dentures, follows up for fittings    Hearing/Vision:  Do you or your family notice any trouble with your hearing that hasn't been managed with hearing aids?: No  Do you have difficulty driving, watching TV, or doing any of your daily activities because of your eyesight?: No  Have you had an eye exam within the past year?: (!) No  No results found. Hearing/Vision Interventions:  Vision concerns:  patient encouraged to make appointment with his/her eye specialist            Objective   Vitals:    11/14/22 1306   BP: 130/60   Site: Right Upper Arm   Position: Sitting   Cuff Size: Medium Adult   Pulse: 69   Temp: 98.1 °F (36.7 °C)   TempSrc: Oral   SpO2: 94%   Weight: 188 lb (85.3 kg)   Height: 5' 8\" (1.727 m)      Body mass index is 28.59 kg/m².       General Appearance: alert and oriented to person, place and time, well developed and well- nourished, in no acute distress  Skin: warm and dry, no rash or erythema  Head: normocephalic and atraumatic  Eyes: pupils equal, round, and reactive to light, extraocular eye movements intact, conjunctivae normal  ENT: tympanic membrane, external ear and ear canal normal bilaterally, nose without deformity, nasal mucosa and turbinates normal without polyps  Neck: supple and non-tender without mass, no thyromegaly or thyroid nodules, no cervical lymphadenopathy  Pulmonary/Chest: clear to auscultation bilaterally- no wheezes, rales or rhonchi, normal air movement, no respiratory distress  Cardiovascular: normal rate, regular rhythm, normal S1 and S2, no murmurs, rubs, clicks, or gallops, distal pulses intact, no carotid bruits  Abdomen: soft, non-tender, non-distended, normal bowel sounds, no masses or organomegaly  Extremities: no cyanosis, clubbing or edema  Musculoskeletal: normal range of motion, no joint swelling, deformity or tenderness  Neurologic: reflexes normal and symmetric, no cranial nerve deficit, gait, coordination and speech normal       Allergies   Allergen Reactions    Penicillins Other (See Comments)     UNKNOWN, CHILD     Prior to Visit Medications    Medication Sig Taking? Authorizing Provider   albuterol sulfate HFA (VENTOLIN HFA) 108 (90 Base) MCG/ACT inhaler Inhale 2 puffs into the lungs 4 times daily as needed for Wheezing Yes CHRIS uG   methIMAzole (TAPAZOLE) 5 MG tablet Take one tab daily Yes NEWTON Hylton CNP   sertraline (ZOLOFT) 100 MG tablet Take 1 tablet by mouth once daily Yes NEWTON Hylton CNP   clopidogrel (PLAVIX) 75 MG tablet Take 1 tablet by mouth daily Yes Catrachita Boateng MD   moexipril (UNIVASC) 15 MG tablet Take 1.5 tab by mouth daily Yes NEWTON Hylton CNP   oxybutynin (DITROPAN) 5 MG tablet TAKE 1 TABLET BY MOUTH TWICE DAILY Yes Historical Provider, MD   tamsulosin (FLOMAX) 0.4 MG capsule TAKE 1 CAPSULE BY MOUTH IN THE EVENING Yes Historical Provider, MD   atorvastatin (LIPITOR) 80 MG tablet Take 1 tablet by mouth once daily Yes Jyl Pump, APRRICHIE - MARIANO   fluticasone-salmeterol (ADVAIR DISKUS) 250-50 MCG/DOSE AEPB Inhale 1 puff into the lungs every 12 hours Yes Godfrey Mejia DO   metoprolol (TOPROL-XL) 25 MG XL tablet Take 25 mg by mouth daily.  Yes Historical Provider, MD   aspirin 81 MG tablet Take 81 mg by mouth nightly otc Yes Historical Provider, MD Marquez (Including outside providers/suppliers regularly involved in providing care):   Patient Care Team:  CHRIS Gu as PCP - General (Physician Assistant)  CHRIS Gu as PCP - [address] Empaneled Provider     Reviewed and updated this visit:  Tobacco  Allergies  Meds  Problems  Med Hx  Surg Hx  Soc Hx  Fam Hx               Discussed with the patient the current USPSTF guidelines released March 9, 2021 for screening for lung cancer. For adults aged 48 to [de-identified] years who have a 20 pack-year smoking history and currently smoke or have quit within the past 15 years the grade B recommendation is to:  Screen for lung cancer with low-dose computed tomography (LDCT) every year. Stop screening once a person has not smoked for 15 years or has a health problem that limits life expectancy or the ability to have lung surgery. The patient  reports that he quit smoking about 3 years ago. His smoking use included cigarettes. He has a 20.00 pack-year smoking history. He has never used smokeless tobacco.. Discussed with patient the risks and benefits of screening, including over-diagnosis, false positive rate, and total radiation exposure. The patient currently exhibits no signs or symptoms suggestive of lung cancer. Discussed with patient the importance of compliance with yearly annual lung cancer screenings and willingness to undergo diagnosis and treatment if screening scan is positive. In addition, the patient was counseled regarding the importance of remaining smoke free and/or total smoking cessation.     Also reviewed the following if the patient has Medicare that as of February 10, 2022, Medicare only covers LDCT screening in patients aged 51-72 with at least a 20 pack-year smoking history who currently smoke or have quit in the last 15 years

## 2022-12-20 NOTE — TELEPHONE ENCOUNTER
Refill Request     CONFIRM preferrred pharmacy with the patient. If Mail Order Rx - Pend for 90 day refill. Last Seen: Last Seen Department: 11/14/2022  Last Seen by PCP: 10/3/2022    Last Written: 08/29/2022 90 tablet 0 refills     If no future appointment scheduled, route STAFF MESSAGE with patient name to the [facility] for scheduling. Next Appointment:   Future Appointments   Date Time Provider Yehuda Rodriguez   11/15/2023  1:00 PM CHRIS Myers - JOSE L       Message sent to [address] [place] [address] to schedule appt with patient?   NO      Requested Prescriptions     Pending Prescriptions Disp Refills    sertraline (ZOLOFT) 100 MG tablet 90 tablet 0     Sig: Take 1 tablet by mouth once daily

## 2022-12-21 RX ORDER — SERTRALINE HYDROCHLORIDE 100 MG/1
TABLET, FILM COATED ORAL
Qty: 90 TABLET | Refills: 3 | OUTPATIENT
Start: 2022-12-21

## 2022-12-21 RX ORDER — SERTRALINE HYDROCHLORIDE 100 MG/1
TABLET, FILM COATED ORAL
Qty: 90 TABLET | Refills: 3 | Status: SHIPPED | OUTPATIENT
Start: 2022-12-21

## 2022-12-21 NOTE — TELEPHONE ENCOUNTER
Refill Request     CONFIRM preferrred pharmacy with the patient. If Mail Order Rx - Pend for 90 day refill. Last Seen: Last Seen Department: 11/14/2022  Last Seen by PCP: 10/3/2022    Last Written: 08/29/2022 90 tablet 0 refills     If no future appointment scheduled, route STAFF MESSAGE with patient name to the [facility] for scheduling. Next Appointment:   Future Appointments   Date Time Provider Yehuda Rodriguez   11/15/2023  1:00 PM CHRIS Wharton  [place] - DYD       Message sent to [address] [place] [address] to schedule appt with patient?   NO      Requested Prescriptions     Pending Prescriptions Disp Refills    sertraline (ZOLOFT) 100 MG tablet 90 tablet 0     Sig: Take 1 tablet by mouth once daily

## 2023-01-12 RX ORDER — MOEXIPRIL HCL 15 MG
TABLET ORAL
Qty: 135 TABLET | Refills: 1 | Status: SHIPPED | OUTPATIENT
Start: 2023-01-12 | End: 2023-01-25 | Stop reason: RX

## 2023-01-24 ENCOUNTER — PATIENT MESSAGE (OUTPATIENT)
Dept: FAMILY MEDICINE CLINIC | Age: 73
End: 2023-01-24

## 2023-01-25 RX ORDER — LISINOPRIL 20 MG/1
30 TABLET ORAL DAILY
Qty: 135 TABLET | Refills: 1 | Status: SHIPPED | OUTPATIENT
Start: 2023-01-25

## 2023-03-02 ENCOUNTER — OFFICE VISIT (OUTPATIENT)
Dept: FAMILY MEDICINE CLINIC | Age: 73
End: 2023-03-02
Payer: MEDICARE

## 2023-03-02 VITALS
DIASTOLIC BLOOD PRESSURE: 82 MMHG | SYSTOLIC BLOOD PRESSURE: 134 MMHG | BODY MASS INDEX: 28.67 KG/M2 | OXYGEN SATURATION: 92 % | WEIGHT: 189.2 LBS | HEART RATE: 88 BPM | TEMPERATURE: 98 F | HEIGHT: 68 IN

## 2023-03-02 DIAGNOSIS — U07.1 COVID-19: Primary | ICD-10-CM

## 2023-03-02 DIAGNOSIS — R05.2 SUBACUTE COUGH: ICD-10-CM

## 2023-03-02 PROCEDURE — 99212 OFFICE O/P EST SF 10 MIN: CPT | Performed by: PHYSICIAN ASSISTANT

## 2023-03-02 PROCEDURE — 3017F COLORECTAL CA SCREEN DOC REV: CPT | Performed by: PHYSICIAN ASSISTANT

## 2023-03-02 PROCEDURE — 3075F SYST BP GE 130 - 139MM HG: CPT | Performed by: PHYSICIAN ASSISTANT

## 2023-03-02 PROCEDURE — 1036F TOBACCO NON-USER: CPT | Performed by: PHYSICIAN ASSISTANT

## 2023-03-02 PROCEDURE — 1123F ACP DISCUSS/DSCN MKR DOCD: CPT | Performed by: PHYSICIAN ASSISTANT

## 2023-03-02 PROCEDURE — G8427 DOCREV CUR MEDS BY ELIG CLIN: HCPCS | Performed by: PHYSICIAN ASSISTANT

## 2023-03-02 PROCEDURE — 3079F DIAST BP 80-89 MM HG: CPT | Performed by: PHYSICIAN ASSISTANT

## 2023-03-02 PROCEDURE — G8484 FLU IMMUNIZE NO ADMIN: HCPCS | Performed by: PHYSICIAN ASSISTANT

## 2023-03-02 PROCEDURE — G8417 CALC BMI ABV UP PARAM F/U: HCPCS | Performed by: PHYSICIAN ASSISTANT

## 2023-03-02 ASSESSMENT — PATIENT HEALTH QUESTIONNAIRE - PHQ9
SUM OF ALL RESPONSES TO PHQ QUESTIONS 1-9: 0
SUM OF ALL RESPONSES TO PHQ QUESTIONS 1-9: 0
5. POOR APPETITE OR OVEREATING: 0
4. FEELING TIRED OR HAVING LITTLE ENERGY: 0
3. TROUBLE FALLING OR STAYING ASLEEP: 0
SUM OF ALL RESPONSES TO PHQ QUESTIONS 1-9: 0
2. FEELING DOWN, DEPRESSED OR HOPELESS: 0
10. IF YOU CHECKED OFF ANY PROBLEMS, HOW DIFFICULT HAVE THESE PROBLEMS MADE IT FOR YOU TO DO YOUR WORK, TAKE CARE OF THINGS AT HOME, OR GET ALONG WITH OTHER PEOPLE: 0
1. LITTLE INTEREST OR PLEASURE IN DOING THINGS: 0
6. FEELING BAD ABOUT YOURSELF - OR THAT YOU ARE A FAILURE OR HAVE LET YOURSELF OR YOUR FAMILY DOWN: 0
8. MOVING OR SPEAKING SO SLOWLY THAT OTHER PEOPLE COULD HAVE NOTICED. OR THE OPPOSITE, BEING SO FIGETY OR RESTLESS THAT YOU HAVE BEEN MOVING AROUND A LOT MORE THAN USUAL: 0
SUM OF ALL RESPONSES TO PHQ QUESTIONS 1-9: 0
9. THOUGHTS THAT YOU WOULD BE BETTER OFF DEAD, OR OF HURTING YOURSELF: 0
SUM OF ALL RESPONSES TO PHQ9 QUESTIONS 1 & 2: 0
7. TROUBLE CONCENTRATING ON THINGS, SUCH AS READING THE NEWSPAPER OR WATCHING TELEVISION: 0

## 2023-03-02 ASSESSMENT — ANXIETY QUESTIONNAIRES
3. WORRYING TOO MUCH ABOUT DIFFERENT THINGS: 0
1. FEELING NERVOUS, ANXIOUS, OR ON EDGE: 0
2. NOT BEING ABLE TO STOP OR CONTROL WORRYING: 0
7. FEELING AFRAID AS IF SOMETHING AWFUL MIGHT HAPPEN: 0
4. TROUBLE RELAXING: 0
6. BECOMING EASILY ANNOYED OR IRRITABLE: 0
IF YOU CHECKED OFF ANY PROBLEMS ON THIS QUESTIONNAIRE, HOW DIFFICULT HAVE THESE PROBLEMS MADE IT FOR YOU TO DO YOUR WORK, TAKE CARE OF THINGS AT HOME, OR GET ALONG WITH OTHER PEOPLE: NOT DIFFICULT AT ALL
GAD7 TOTAL SCORE: 0
5. BEING SO RESTLESS THAT IT IS HARD TO SIT STILL: 0

## 2023-03-02 ASSESSMENT — ENCOUNTER SYMPTOMS
NAUSEA: 0
SORE THROAT: 0
SHORTNESS OF BREATH: 0
VOMITING: 0
CONSTIPATION: 0
ABDOMINAL PAIN: 0
RHINORRHEA: 0
COUGH: 1
DIARRHEA: 0

## 2023-03-02 NOTE — PROGRESS NOTES
3/2/2023  Eduardo Alicia (: 1950)  72 y.o.    ASSESSMENT and PLAN:  Eduardo was seen today for other and cough.    Diagnoses and all orders for this visit:    COVID-19  Subacute cough  -Normal temperature in office today-encouraged to use sublingual thermometer rather than temporal scanner.  - Overall feeling better, discussed risk for secondary bacterial infection following COVID.  Patient to keep close eye on cough.  He will notify the office with increased congestion worsening cough shortness of breath chest pain and fever.      Return if symptoms worsen or fail to improve.    HPI    Patient comes in for evaluation of low temperature.   Reports that he tested positive for covid about 3 weeks ago   Has been monitoring his temperature and reports it has been low intermittently 92 sporadically throughout the week. Has read his wife's temp normally.   Reports 3 weeks ago was feeling tired with body aches and chills nasal congestion and cough.  Has felt progressively better.  Still with lingering cough which is responding to Mucinex well.  Has been monitoring his temperature denies any readings higher than 98.  Denies chest pain shortness of breath wheezing.  Has not had to use his inhaler.      Review of Systems   Constitutional:  Positive for fatigue. Negative for activity change, chills and fever.   HENT:  Negative for congestion, ear pain, rhinorrhea and sore throat.    Eyes:  Negative for visual disturbance.   Respiratory:  Positive for cough. Negative for shortness of breath.    Cardiovascular:  Negative for chest pain and palpitations.   Gastrointestinal:  Negative for abdominal pain, constipation, diarrhea, nausea and vomiting.   Genitourinary:  Negative for difficulty urinating and dysuria.   Musculoskeletal:  Negative for arthralgias and myalgias.   Skin:  Negative for rash.   Neurological:  Negative for dizziness, weakness and numbness.   Psychiatric/Behavioral:  Negative for sleep disturbance.   Allergies, past medical history, family history, and social history reviewed and unchanged from previous encounter. Current Outpatient Medications   Medication Sig Dispense Refill    lisinopril (PRINIVIL;ZESTRIL) 20 MG tablet Take 1.5 tablets by mouth daily 135 tablet 1    sertraline (ZOLOFT) 100 MG tablet Take 1 tablet by mouth once daily 90 tablet 3    albuterol sulfate HFA (VENTOLIN HFA) 108 (90 Base) MCG/ACT inhaler Inhale 2 puffs into the lungs 4 times daily as needed for Wheezing 18 g 5    methIMAzole (TAPAZOLE) 5 MG tablet Take one tab daily 90 tablet 1    oxybutynin (DITROPAN) 5 MG tablet TAKE 1 TABLET BY MOUTH TWICE DAILY      tamsulosin (FLOMAX) 0.4 MG capsule TAKE 1 CAPSULE BY MOUTH IN THE EVENING      atorvastatin (LIPITOR) 80 MG tablet Take 1 tablet by mouth once daily 90 tablet 3    fluticasone-salmeterol (ADVAIR DISKUS) 250-50 MCG/DOSE AEPB Inhale 1 puff into the lungs every 12 hours 180 each 3    metoprolol (TOPROL-XL) 25 MG XL tablet Take 25 mg by mouth daily. aspirin 81 MG tablet Take 81 mg by mouth nightly otc      clopidogrel (PLAVIX) 75 MG tablet Take 1 tablet by mouth daily 30 tablet 3     No current facility-administered medications for this visit. Vitals:    03/02/23 1412   BP: 134/82   Site: Right Upper Arm   Position: Sitting   Cuff Size: Medium Adult   Pulse: 88   Temp: 98 °F (36.7 °C)   TempSrc: Oral   SpO2: 92%   Weight: 189 lb 3.2 oz (85.8 kg)   Height: 5' 8\" (1.727 m)     Estimated body mass index is 28.77 kg/m² as calculated from the following:    Height as of this encounter: 5' 8\" (1.727 m). Weight as of this encounter: 189 lb 3.2 oz (85.8 kg). Physical Exam  Constitutional:       Appearance: Normal appearance. HENT:      Head: Normocephalic and atraumatic. Nose: Nose normal.      Mouth/Throat:      Mouth: Mucous membranes are dry. Eyes:      Extraocular Movements: Extraocular movements intact.    Cardiovascular:      Rate and Rhythm: Normal rate and regular rhythm. Pulmonary:      Effort: Pulmonary effort is normal.      Breath sounds: Normal breath sounds. Neurological:      General: No focal deficit present. Mental Status: He is alert and oriented to person, place, and time.    Psychiatric:         Mood and Affect: Mood normal.         Behavior: Behavior normal.

## 2023-03-07 ENCOUNTER — TELEPHONE (OUTPATIENT)
Dept: FAMILY MEDICINE CLINIC | Age: 73
End: 2023-03-07

## 2023-03-07 DIAGNOSIS — R05.2 SUBACUTE COUGH: Primary | ICD-10-CM

## 2023-03-07 NOTE — TELEPHONE ENCOUNTER
Pts wife called In stating that in the past the pt has had cough syrup prescribed to him but she was wondering if the pt can have this sent in again for his current cough?

## 2023-03-08 RX ORDER — PROMETHAZINE HYDROCHLORIDE AND CODEINE PHOSPHATE 6.25; 1 MG/5ML; MG/5ML
5 SYRUP ORAL NIGHTLY PRN
Qty: 120 ML | Refills: 0 | Status: SHIPPED | OUTPATIENT
Start: 2023-03-08 | End: 2023-03-11

## 2023-03-08 NOTE — TELEPHONE ENCOUNTER
I informed Eduardo's wife Sarah Clemons of this and she stated that the cough syrup that you talked to him about during his visit is the one he is requesting. She stated that it has steroids in it. Is this the correct cough syrup?

## 2023-03-08 NOTE — TELEPHONE ENCOUNTER
Cough medication sent to the pharmacy- I am unable to see if this is what he was on in the past. It does have codeine in it which can cause drowsiness.

## 2023-03-09 RX ORDER — PREDNISONE 10 MG/1
40 TABLET ORAL DAILY
Qty: 20 TABLET | Refills: 0 | Status: SHIPPED | OUTPATIENT
Start: 2023-03-09 | End: 2023-03-14

## 2023-03-09 NOTE — TELEPHONE ENCOUNTER
The medication we discussed during the appointment was a steroid burst, 5 days of an oral tablet. I have sent  this to the pharmacy. Can also take the cough syrup if he needs it.

## 2023-03-09 NOTE — TELEPHONE ENCOUNTER
Pt calling in stating the pharmacy called him yesterday and told him the cough medication he wanted is not made anymore so pt would need an alternative.

## 2023-03-09 NOTE — TELEPHONE ENCOUNTER
Please have patient start the steroid that was sent in this morning, can take OTC robitussin. If no improvement after the steroid, notify the office.

## 2023-03-13 DIAGNOSIS — E78.2 MIXED HYPERLIPIDEMIA: ICD-10-CM

## 2023-03-13 RX ORDER — ATORVASTATIN CALCIUM 80 MG/1
TABLET, FILM COATED ORAL
Qty: 90 TABLET | Refills: 1 | Status: SHIPPED | OUTPATIENT
Start: 2023-03-13

## 2023-03-13 NOTE — TELEPHONE ENCOUNTER
Refill Request     CONFIRM preferred pharmacy with the patient. If Mail Order Rx - Pend for 90 day refill. Last Seen: Last Seen Department: 3/2/2023  Last Seen by PCP: Visit date not found    Last Written: 01/15/2022 90 tablet 3 refills     If no future appointment scheduled, route STAFF MESSAGE with patient name to the [facility] for scheduling. Next Appointment:   Future Appointments   Date Time Provider Yehuda Rodriguez   11/15/2023  1:00 PM CHRIS Cal[place]oun [place] - DYJR       Message sent to [facility] to schedule appt with patient?   NO      Requested Prescriptions     Pending Prescriptions Disp Refills    atorvastatin (LIPITOR) 80 MG tablet [Pharmacy Med Name: Atorvastatin Calcium 80 MG Oral Tablet] 90 tablet 0     Sig: Take 1 tablet by mouth once daily

## 2023-03-22 ENCOUNTER — TELEPHONE (OUTPATIENT)
Dept: FAMILY MEDICINE CLINIC | Age: 73
End: 2023-03-22

## 2023-04-03 ENCOUNTER — TELEPHONE (OUTPATIENT)
Dept: FAMILY MEDICINE CLINIC | Age: 73
End: 2023-04-03

## 2023-04-03 ENCOUNTER — OFFICE VISIT (OUTPATIENT)
Dept: FAMILY MEDICINE CLINIC | Age: 73
End: 2023-04-03
Payer: MEDICARE

## 2023-04-03 VITALS
BODY MASS INDEX: 28.89 KG/M2 | SYSTOLIC BLOOD PRESSURE: 110 MMHG | HEART RATE: 78 BPM | WEIGHT: 190.6 LBS | DIASTOLIC BLOOD PRESSURE: 52 MMHG | HEIGHT: 68 IN | TEMPERATURE: 97.9 F | OXYGEN SATURATION: 94 %

## 2023-04-03 DIAGNOSIS — C67.9 MALIGNANT NEOPLASM OF URINARY BLADDER, UNSPECIFIED SITE (HCC): ICD-10-CM

## 2023-04-03 DIAGNOSIS — Z01.818 PRE-OP EXAM: Primary | ICD-10-CM

## 2023-04-03 DIAGNOSIS — Z01.818 PRE-OP EXAM: ICD-10-CM

## 2023-04-03 LAB
ANION GAP SERPL CALCULATED.3IONS-SCNC: 13 MMOL/L (ref 3–16)
BILIRUBIN, POC: NEGATIVE
BLOOD URINE, POC: NEGATIVE
BUN SERPL-MCNC: 23 MG/DL (ref 7–20)
CALCIUM SERPL-MCNC: 9 MG/DL (ref 8.3–10.6)
CHLORIDE SERPL-SCNC: 107 MMOL/L (ref 99–110)
CLARITY, POC: ABNORMAL
CO2 SERPL-SCNC: 23 MMOL/L (ref 21–32)
COLOR, POC: YELLOW
CREAT SERPL-MCNC: 1.4 MG/DL (ref 0.8–1.3)
DEPRECATED RDW RBC AUTO: 19.4 % (ref 12.4–15.4)
GFR SERPLBLD CREATININE-BSD FMLA CKD-EPI: 53 ML/MIN/{1.73_M2}
GLUCOSE SERPL-MCNC: 101 MG/DL (ref 70–99)
GLUCOSE URINE, POC: NEGATIVE
HCT VFR BLD AUTO: 40.2 % (ref 40.5–52.5)
HGB BLD-MCNC: 13 G/DL (ref 13.5–17.5)
KETONES, POC: NEGATIVE
LEUKOCYTE EST, POC: ABNORMAL
MCH RBC QN AUTO: 25.7 PG (ref 26–34)
MCHC RBC AUTO-ENTMCNC: 32.5 G/DL (ref 31–36)
MCV RBC AUTO: 79 FL (ref 80–100)
NITRITE, POC: NEGATIVE
PH, POC: 6
PLATELET # BLD AUTO: 126 K/UL (ref 135–450)
PMV BLD AUTO: 8.7 FL (ref 5–10.5)
POTASSIUM SERPL-SCNC: 4.6 MMOL/L (ref 3.5–5.1)
PROTEIN, POC: NEGATIVE
RBC # BLD AUTO: 5.08 M/UL (ref 4.2–5.9)
SODIUM SERPL-SCNC: 143 MMOL/L (ref 136–145)
SPECIFIC GRAVITY, POC: >=1.03
UROBILINOGEN, POC: ABNORMAL
WBC # BLD AUTO: 5.3 K/UL (ref 4–11)

## 2023-04-03 PROCEDURE — 3078F DIAST BP <80 MM HG: CPT | Performed by: PHYSICIAN ASSISTANT

## 2023-04-03 PROCEDURE — 99214 OFFICE O/P EST MOD 30 MIN: CPT | Performed by: PHYSICIAN ASSISTANT

## 2023-04-03 PROCEDURE — 3074F SYST BP LT 130 MM HG: CPT | Performed by: PHYSICIAN ASSISTANT

## 2023-04-03 PROCEDURE — 93000 ELECTROCARDIOGRAM COMPLETE: CPT | Performed by: PHYSICIAN ASSISTANT

## 2023-04-03 PROCEDURE — 3017F COLORECTAL CA SCREEN DOC REV: CPT | Performed by: PHYSICIAN ASSISTANT

## 2023-04-03 PROCEDURE — 1036F TOBACCO NON-USER: CPT | Performed by: PHYSICIAN ASSISTANT

## 2023-04-03 PROCEDURE — 1123F ACP DISCUSS/DSCN MKR DOCD: CPT | Performed by: PHYSICIAN ASSISTANT

## 2023-04-03 PROCEDURE — G8427 DOCREV CUR MEDS BY ELIG CLIN: HCPCS | Performed by: PHYSICIAN ASSISTANT

## 2023-04-03 PROCEDURE — G8417 CALC BMI ABV UP PARAM F/U: HCPCS | Performed by: PHYSICIAN ASSISTANT

## 2023-04-03 PROCEDURE — 81002 URINALYSIS NONAUTO W/O SCOPE: CPT | Performed by: PHYSICIAN ASSISTANT

## 2023-04-03 NOTE — TELEPHONE ENCOUNTER
I have a call out to Dr. Kvng Li office regarding Eduardo's upcoming surgery, calling to see if he has cardiac clearance and if he is okay to stop his blood thinner for surgery. I requested that they fax the response to both our office and [facility].

## 2023-04-04 LAB — BACTERIA UR CULT: NORMAL

## 2023-04-06 DIAGNOSIS — D50.9 IRON DEFICIENCY ANEMIA, UNSPECIFIED IRON DEFICIENCY ANEMIA TYPE: ICD-10-CM

## 2023-04-06 DIAGNOSIS — D69.6 THROMBOCYTOPENIA (HCC): Primary | ICD-10-CM

## 2023-06-02 DIAGNOSIS — D50.9 IRON DEFICIENCY ANEMIA, UNSPECIFIED IRON DEFICIENCY ANEMIA TYPE: ICD-10-CM

## 2023-06-02 DIAGNOSIS — D69.6 THROMBOCYTOPENIA (HCC): ICD-10-CM

## 2023-06-02 LAB
BASOPHILS # BLD: 0 K/UL (ref 0–0.2)
BASOPHILS NFR BLD: 0.9 %
DEPRECATED RDW RBC AUTO: 19 % (ref 12.4–15.4)
EOSINOPHIL # BLD: 0.1 K/UL (ref 0–0.6)
EOSINOPHIL NFR BLD: 1.7 %
HCT VFR BLD AUTO: 39.8 % (ref 40.5–52.5)
HGB BLD-MCNC: 13 G/DL (ref 13.5–17.5)
IRON SATN MFR SERPL: 22 % (ref 20–50)
IRON SERPL-MCNC: 79 UG/DL (ref 59–158)
LYMPHOCYTES # BLD: 1.1 K/UL (ref 1–5.1)
LYMPHOCYTES NFR BLD: 20.4 %
MCH RBC QN AUTO: 26.2 PG (ref 26–34)
MCHC RBC AUTO-ENTMCNC: 32.6 G/DL (ref 31–36)
MCV RBC AUTO: 80.2 FL (ref 80–100)
MONOCYTES # BLD: 1.3 K/UL (ref 0–1.3)
MONOCYTES NFR BLD: 24.5 %
NEUTROPHILS # BLD: 2.8 K/UL (ref 1.7–7.7)
NEUTROPHILS NFR BLD: 52.5 %
PLATELET # BLD AUTO: 126 K/UL (ref 135–450)
PMV BLD AUTO: 9 FL (ref 5–10.5)
RBC # BLD AUTO: 4.96 M/UL (ref 4.2–5.9)
TIBC SERPL-MCNC: 355 UG/DL (ref 260–445)
WBC # BLD AUTO: 5.4 K/UL (ref 4–11)

## 2023-07-24 ENCOUNTER — HOSPITAL ENCOUNTER (OUTPATIENT)
Dept: CT IMAGING | Age: 73
Discharge: HOME | End: 2023-07-24
Payer: MEDICARE

## 2023-07-24 DIAGNOSIS — Z87.891 PERSONAL HISTORY OF TOBACCO USE: ICD-10-CM

## 2023-07-24 PROCEDURE — 71271 CT THORAX LUNG CANCER SCR C-: CPT

## 2023-07-26 RX ORDER — LISINOPRIL 20 MG/1
30 TABLET ORAL DAILY
Qty: 135 TABLET | Refills: 1 | Status: SHIPPED | OUTPATIENT
Start: 2023-07-26

## 2023-07-26 NOTE — TELEPHONE ENCOUNTER
.Refill Request     CONFIRM preferred pharmacy with the patient. If Mail Order Rx - Pend for 90 day refill. Last Seen: Last Seen Department: 4/3/2023  Last Seen by PCP: 8/26/2021    Last Written: 6-22-21 180 with 3     If no future appointment scheduled:  Review the last OV with PCP and review information for follow-up visit,  Route STAFF MESSAGE with patient name to the [facility] for scheduling with the following information:            -  Timing of next visit           -  Visit type ie Physical, OV, etc           -  Diagnoses/Reason ie. COPD, HTN - Do not use MEDICATION, Follow-up or CHECK UP - Give reason for visit      Next Appointment:   Future Appointments   Date Time Provider [address]   11/15/2023  1:00 PM CHRIS Irizarry  [place] - JOSE L       Message sent to [address] [place] [address] to schedule appt with patient? N/A      Requested Prescriptions     Pending Prescriptions Disp Refills    fluticasone-salmeterol (ADVAIR) 250-50 MCG/DOSE AEPB 180 each 3     Sig: Inhale 1 puff into the lungs in the morning and 1 puff in the evening.

## 2023-07-26 NOTE — TELEPHONE ENCOUNTER
.Refill Request     CONFIRM preferred pharmacy with the patient. If Mail Order Rx - Pend for 90 day refill. Last Seen: Last Seen Department: 4/3/2023  Last Seen by PCP: 4/3/2023    Last Written: 1-25-23 135 with 1     If no future appointment scheduled:  Review the last OV with PCP and review information for follow-up visit,  Route STAFF MESSAGE with patient name to the [facility] for scheduling with the following information:            -  Timing of next visit           -  Visit type ie Physical, OV, etc           -  Diagnoses/Reason ie. COPD, HTN - Do not use MEDICATION, Follow-up or CHECK UP - Give reason for visit      Next Appointment:   Future Appointments   Date Time Provider [facility] [address]   11/15/2023  1:00 PM CHRIS Mcdermott  Del - JOSE L       Message sent to [address] [place] [address] to schedule appt with patient?   N/A      Requested Prescriptions     Pending Prescriptions Disp Refills    lisinopril (PRINIVIL;ZESTRIL) 20 MG tablet 135 tablet 1     Sig: Take 1.5 tablets by mouth daily

## 2023-07-30 ENCOUNTER — TELEPHONE (OUTPATIENT)
Dept: CASE MANAGEMENT | Age: 73
End: 2023-07-30

## 2023-07-30 DIAGNOSIS — R91.1 LUNG NODULE: Primary | ICD-10-CM

## 2023-07-31 ENCOUNTER — TELEPHONE (OUTPATIENT)
Dept: FAMILY MEDICINE CLINIC | Age: 73
End: 2023-07-31

## 2023-07-31 NOTE — TELEPHONE ENCOUNTER
Referral placed for [facility] [address] [place] [address] [phone] spoke with Anna-needed to schedule ASAP for spiculated lung mass on Ct screen-she stated a message had to be sent back for physicians and they will decide which physician needs scheduled with and will call back with who scheduled and time

## 2023-07-31 NOTE — TELEPHONE ENCOUNTER
Please review  Lung Nodule findings   Please provide schedule appointment time for patient ( New Patient ) Referral In Chart  Family practice called requesting Patient ASAP appointment needed  Which provider can add patient on to their schedule  [facility] [phone] is requesting a call back to schedule patient   Spoke with Johnny Holt w/ [facility]

## 2023-08-03 ENCOUNTER — OFFICE VISIT (OUTPATIENT)
Dept: PULMONOLOGY | Age: 73
End: 2023-08-03
Payer: MEDICARE

## 2023-08-03 ENCOUNTER — TELEPHONE (OUTPATIENT)
Dept: PULMONOLOGY | Age: 73
End: 2023-08-03

## 2023-08-03 VITALS
OXYGEN SATURATION: 95 % | SYSTOLIC BLOOD PRESSURE: 116 MMHG | HEART RATE: 90 BPM | TEMPERATURE: 97.3 F | DIASTOLIC BLOOD PRESSURE: 50 MMHG | BODY MASS INDEX: 28.25 KG/M2 | HEIGHT: 68 IN | WEIGHT: 186.4 LBS | RESPIRATION RATE: 18 BRPM

## 2023-08-03 DIAGNOSIS — Z87.891 HISTORY OF TOBACCO ABUSE: Primary | ICD-10-CM

## 2023-08-03 DIAGNOSIS — Z87.891 HISTORY OF TOBACCO ABUSE: ICD-10-CM

## 2023-08-03 DIAGNOSIS — R91.1 LUNG NODULE: Primary | ICD-10-CM

## 2023-08-03 DIAGNOSIS — C67.9 MALIGNANT NEOPLASM OF URINARY BLADDER, UNSPECIFIED SITE (HCC): ICD-10-CM

## 2023-08-03 DIAGNOSIS — J43.9 PULMONARY EMPHYSEMA, UNSPECIFIED EMPHYSEMA TYPE (HCC): ICD-10-CM

## 2023-08-03 DIAGNOSIS — J44.9 CHRONIC OBSTRUCTIVE PULMONARY DISEASE, UNSPECIFIED COPD TYPE (HCC): ICD-10-CM

## 2023-08-03 PROCEDURE — 3074F SYST BP LT 130 MM HG: CPT | Performed by: INTERNAL MEDICINE

## 2023-08-03 PROCEDURE — 3023F SPIROM DOC REV: CPT | Performed by: INTERNAL MEDICINE

## 2023-08-03 PROCEDURE — G8427 DOCREV CUR MEDS BY ELIG CLIN: HCPCS | Performed by: INTERNAL MEDICINE

## 2023-08-03 PROCEDURE — 3078F DIAST BP <80 MM HG: CPT | Performed by: INTERNAL MEDICINE

## 2023-08-03 PROCEDURE — 99204 OFFICE O/P NEW MOD 45 MIN: CPT | Performed by: INTERNAL MEDICINE

## 2023-08-03 PROCEDURE — 3017F COLORECTAL CA SCREEN DOC REV: CPT | Performed by: INTERNAL MEDICINE

## 2023-08-03 PROCEDURE — G8417 CALC BMI ABV UP PARAM F/U: HCPCS | Performed by: INTERNAL MEDICINE

## 2023-08-03 PROCEDURE — 1123F ACP DISCUSS/DSCN MKR DOCD: CPT | Performed by: INTERNAL MEDICINE

## 2023-08-03 PROCEDURE — 94664 DEMO&/EVAL PT USE INHALER: CPT | Performed by: INTERNAL MEDICINE

## 2023-08-03 PROCEDURE — 1036F TOBACCO NON-USER: CPT | Performed by: INTERNAL MEDICINE

## 2023-08-03 RX ORDER — FLUTICASONE FUROATE, UMECLIDINIUM BROMIDE AND VILANTEROL TRIFENATATE 200; 62.5; 25 UG/1; UG/1; UG/1
1 POWDER RESPIRATORY (INHALATION) DAILY
Qty: 1 EACH | Refills: 0 | COMMUNITY
Start: 2023-08-03

## 2023-08-03 ASSESSMENT — ENCOUNTER SYMPTOMS
ALLERGIC/IMMUNOLOGIC NEGATIVE: 1
EYES NEGATIVE: 1
GASTROINTESTINAL NEGATIVE: 1
RESPIRATORY NEGATIVE: 1

## 2023-08-03 NOTE — PROGRESS NOTES
Jarrod Adam (: 1950 ) is a 74 y.o. male here for an evaluation of   Chief Complaint   Patient presents with    [facility]     R/b Soha Krishna    Pulmonary Nodule         ASSESSMENT/PLAN:   Diagnosis Orders   1. Lung nodule        2. Pulmonary emphysema, unspecified emphysema type ([address])        3. Chronic obstructive pulmonary disease, unspecified COPD type ([address])        4. History of tobacco abuse        5. Malignant neoplasm of urinary bladder, unspecified site [facility])          Lung nodule    CT scan done 2023  IMPRESSION:  New 12 x 12 mm spiculated right upper lobe pulmonary nodule. Moderate centrilobular emphysema. Findings of COPD. Bilateral hyperattenuating renal cortical lesions slightly increased in size  since 2015 examination. Although these are indeterminate on this  examination, hemorrhagic/proteinaceous cysts are favored given their slow  interval growth compared 2015. However, definitive evaluation would require  renal mass protocol CT or MRI to exclude enhancing or nodular component. Patient has a history of T1 high-grade papillary urethral carcinoma diagnosed in 2021. Plan  Will do   PET scan  [facility]    Will call once this are done    If + then surgery  If Negative then will go for repeat Ct scan in 3 months          COPD  Will get PFT for this and also potential surgery    Continue with  Albuterol 2puffs as needed    Will stop   Advair      I will give sample of  Trelegy 200 1 puff once a day  Shown how to use    Call me in 2 weeks            No follow-ups on file. SUBJECTIVE/OBJECTIVE:    Consult from Elsi Noel  Initially seen on 8/3/2023  Re: Lung nodule        Patient has a history of T1 high-grade papillary urethral carcinoma diagnosed in 2021.         Quit smoking   2019  50+ pky of smoking      Some cough with phelgm  Has dx of COPD    Currently on   Advair 250/50 bid  Albuterol    Since           Review of Systems

## 2023-08-03 NOTE — PATIENT INSTRUCTIONS
ASSESSMENT/PLAN:   Diagnosis Orders   1. Lung nodule        2. Pulmonary emphysema, unspecified emphysema type ([address])        3. Chronic obstructive pulmonary disease, unspecified COPD type ([address])        4. History of tobacco abuse        5. Malignant neoplasm of urinary bladder, unspecified site [facility])          Lung nodule    CT scan done 7/28/2023  IMPRESSION:  New 12 x 12 mm spiculated right upper lobe pulmonary nodule. Moderate centrilobular emphysema. Findings of COPD. Bilateral hyperattenuating renal cortical lesions slightly increased in size  since 2015 examination. Although these are indeterminate on this  examination, hemorrhagic/proteinaceous cysts are favored given their slow  interval growth compared 2015. However, definitive evaluation would require  renal mass protocol CT or MRI to exclude enhancing or nodular component. Patient has a history of T1 high-grade papillary urethral carcinoma diagnosed in October 2021. Plan  Will do   PET scan  [facility]    Will call once this are done    If + then surgery  If Negative then will go for repeat Ct scan in 3 months          COPD  Will get PFT for this and also potential surgery    Continue with  Albuterol 2puffs as needed    Will stop   Advair      I will give sample of  Trelegy 200 1 puff once a day  Shown how to use    Call me in 2 weeks    Remember to bring a list of pulmonary medications and any CPAP or BiPAP machines to your next appointment with the office. Please keep all of your future appointments scheduled by [facility] office. Out of respect for other patients and providers, you may be asked to reschedule your appointment if you arrive later than your scheduled appointment time. Appointments cancelled less than 24hrs in advance will be considered a no show.  Patients with three missed appointments within 1 year or four missed appointments within 2 years can be dismissed from the

## 2023-08-03 NOTE — PROGRESS NOTES
MA Communication: The following orders are received by verbal communication from Hemant Tarango MD    Orders include:  PFT    And  PET CT Our office is scheduling   [facility]     Follow up with Dr. Robert Jolly 3 months     [employer] Customer service  [phone]  If you do not hear from them within a  week call to check the status of order.     PET CT scheduled August 18,2023  arrival 12:45pm  at [facility]

## 2023-08-04 RX ORDER — LORAZEPAM 1 MG/1
1 TABLET ORAL EVERY 6 HOURS PRN
Qty: 3 TABLET | Refills: 0 | Status: SHIPPED | OUTPATIENT
Start: 2023-08-04 | End: 2023-08-05

## 2023-08-04 NOTE — TELEPHONE ENCOUNTER
I will give him 1 mg Ativan 3 tablets to take with him
Patient informed
Patient is scheduled on August 18th for his PET CT   Patient stated he will need something to relax him for the test   Requesting whatever sedation recommended be sent to [facility]
N/A

## 2023-08-18 ENCOUNTER — HOSPITAL ENCOUNTER (OUTPATIENT)
Dept: PET IMAGING | Age: 73
Discharge: HOME | End: 2023-08-18
Payer: MEDICARE

## 2023-08-18 DIAGNOSIS — R91.1 LUNG NODULE: ICD-10-CM

## 2023-08-18 DIAGNOSIS — C67.9 MALIGNANT NEOPLASM OF URINARY BLADDER, UNSPECIFIED SITE (HCC): ICD-10-CM

## 2023-08-18 PROCEDURE — 3430000000 HC RX DIAGNOSTIC RADIOPHARMACEUTICAL: Performed by: INTERNAL MEDICINE

## 2023-08-18 PROCEDURE — A9552 F18 FDG: HCPCS | Performed by: INTERNAL MEDICINE

## 2023-08-18 PROCEDURE — 78815 PET IMAGE W/CT SKULL-THIGH: CPT

## 2023-08-18 RX ORDER — FLUDEOXYGLUCOSE F 18 200 MCI/ML
17.33 INJECTION, SOLUTION INTRAVENOUS
Status: COMPLETED | OUTPATIENT
Start: 2023-08-18 | End: 2023-08-18

## 2023-08-18 RX ORDER — FLUTICASONE FUROATE, UMECLIDINIUM BROMIDE AND VILANTEROL TRIFENATATE 200; 62.5; 25 UG/1; UG/1; UG/1
1 POWDER RESPIRATORY (INHALATION) DAILY
Qty: 1 EACH | Refills: 11 | Status: SHIPPED | OUTPATIENT
Start: 2023-08-18

## 2023-08-18 RX ADMIN — FLUDEOXYGLUCOSE F 18 17.33 MILLICURIE: 200 INJECTION, SOLUTION INTRAVENOUS at 12:19

## 2023-08-21 ENCOUNTER — TELEPHONE (OUTPATIENT)
Dept: PULMONOLOGY | Age: 73
End: 2023-08-21

## 2023-08-21 DIAGNOSIS — Z87.891 HISTORY OF TOBACCO ABUSE: ICD-10-CM

## 2023-08-21 DIAGNOSIS — R91.1 LUNG NODULE: Primary | ICD-10-CM

## 2023-08-21 NOTE — TELEPHONE ENCOUNTER
IMPRESSION:  1. Decreased size and conspicuity of previously described spiculated nodule  in the right upper lobe. There is also no significant activity. This is  suspected to have been an area of either atelectasis or mild  inflammation/infection on the prior study. Recommend CT follow-up in 6-12  months to ensure stability/resolution. 2. Centrilobular emphysema is otherwise noted. 3. Limited characterization of complex renal cysts with recommendations  previously provided.   No activity on current exam.      Biodesix was intermediate        However with the PET scan showing a decrease in size and no activity would then repeat CT scan in 3 months  We will go ahead and plan on doing a repeat CT scan in 3 months patient has an appointment with me in December  We will have the CT scan just prior to coming in to see me    Patient is also done well with the Trelegy and that has been called in for him      Order will be in the computer for the CAT scan

## 2023-08-22 ENCOUNTER — HOSPITAL ENCOUNTER (INPATIENT)
Age: 73
LOS: 10 days | Discharge: HOME | End: 2023-09-01
Attending: EMERGENCY MEDICINE | Admitting: STUDENT IN AN ORGANIZED HEALTH CARE EDUCATION/TRAINING PROGRAM
Payer: MEDICARE

## 2023-08-22 ENCOUNTER — APPOINTMENT (OUTPATIENT)
Dept: GENERAL RADIOLOGY | Age: 73
End: 2023-08-22
Payer: MEDICARE

## 2023-08-22 DIAGNOSIS — I25.10 CORONARY ARTERY DISEASE INVOLVING NATIVE CORONARY ARTERY OF NATIVE HEART WITHOUT ANGINA PECTORIS: ICD-10-CM

## 2023-08-22 DIAGNOSIS — I25.10 CORONARY ARTERY DISEASE, UNSPECIFIED VESSEL OR LESION TYPE, UNSPECIFIED WHETHER ANGINA PRESENT, UNSPECIFIED WHETHER NATIVE OR TRANSPLANTED HEART: ICD-10-CM

## 2023-08-22 DIAGNOSIS — Z95.1 S/P CABG (CORONARY ARTERY BYPASS GRAFT): ICD-10-CM

## 2023-08-22 DIAGNOSIS — R07.9 CHEST PAIN, UNSPECIFIED TYPE: Primary | ICD-10-CM

## 2023-08-22 LAB
ALBUMIN SERPL-MCNC: 4.3 G/DL (ref 3.4–5)
ALBUMIN/GLOB SERPL: 1.5 {RATIO} (ref 1.1–2.2)
ALP SERPL-CCNC: 87 U/L (ref 40–129)
ALT SERPL-CCNC: 20 U/L (ref 10–40)
ANION GAP SERPL CALCULATED.3IONS-SCNC: 12 MMOL/L (ref 3–16)
ANION GAP SERPL CALCULATED.3IONS-SCNC: 9 MMOL/L (ref 3–16)
ANTI-XA UNFRAC HEPARIN: 0.51 IU/ML (ref 0.3–0.7)
ANTI-XA UNFRAC HEPARIN: 0.64 IU/ML (ref 0.3–0.7)
APTT BLD: 33.1 SEC (ref 22.7–35.9)
AST SERPL-CCNC: 17 U/L (ref 15–37)
BASOPHILS # BLD: 0.1 K/UL (ref 0–0.2)
BASOPHILS NFR BLD: 1.4 %
BILIRUB SERPL-MCNC: 0.3 MG/DL (ref 0–1)
BUN SERPL-MCNC: 43 MG/DL (ref 7–20)
BUN SERPL-MCNC: 44 MG/DL (ref 7–20)
CALCIUM SERPL-MCNC: 9.5 MG/DL (ref 8.3–10.6)
CALCIUM SERPL-MCNC: 9.7 MG/DL (ref 8.3–10.6)
CHLORIDE SERPL-SCNC: 105 MMOL/L (ref 99–110)
CHLORIDE SERPL-SCNC: 108 MMOL/L (ref 99–110)
CHOLEST SERPL-MCNC: 144 MG/DL (ref 0–199)
CO2 SERPL-SCNC: 22 MMOL/L (ref 21–32)
CO2 SERPL-SCNC: 23 MMOL/L (ref 21–32)
CREAT SERPL-MCNC: 2 MG/DL (ref 0.8–1.3)
CREAT SERPL-MCNC: 2.2 MG/DL (ref 0.8–1.3)
DEPRECATED RDW RBC AUTO: 17.8 % (ref 12.4–15.4)
EKG ATRIAL RATE: 77 BPM
EKG ATRIAL RATE: 94 BPM
EKG DIAGNOSIS: NORMAL
EKG DIAGNOSIS: NORMAL
EKG P AXIS: 75 DEGREES
EKG P AXIS: 79 DEGREES
EKG P-R INTERVAL: 126 MS
EKG P-R INTERVAL: 130 MS
EKG Q-T INTERVAL: 348 MS
EKG Q-T INTERVAL: 372 MS
EKG QRS DURATION: 120 MS
EKG QRS DURATION: 122 MS
EKG QTC CALCULATION (BAZETT): 420 MS
EKG QTC CALCULATION (BAZETT): 435 MS
EKG R AXIS: 70 DEGREES
EKG R AXIS: 88 DEGREES
EKG T AXIS: 59 DEGREES
EKG T AXIS: 62 DEGREES
EKG VENTRICULAR RATE: 77 BPM
EKG VENTRICULAR RATE: 94 BPM
EOSINOPHIL # BLD: 0.1 K/UL (ref 0–0.6)
EOSINOPHIL NFR BLD: 1.3 %
EST. AVERAGE GLUCOSE BLD GHB EST-MCNC: 134.1 MG/DL
GFR SERPLBLD CREATININE-BSD FMLA CKD-EPI: 30 ML/MIN/{1.73_M2}
GFR SERPLBLD CREATININE-BSD FMLA CKD-EPI: 34 ML/MIN/{1.73_M2}
GLUCOSE SERPL-MCNC: 121 MG/DL (ref 70–99)
GLUCOSE SERPL-MCNC: 129 MG/DL (ref 70–99)
HBA1C MFR BLD: 6.3 %
HCT VFR BLD AUTO: 42.3 % (ref 40.5–52.5)
HDLC SERPL-MCNC: 31 MG/DL (ref 40–60)
HGB BLD-MCNC: 13.8 G/DL (ref 13.5–17.5)
INR PPP: 1.09 (ref 0.84–1.16)
LDLC SERPL CALC-MCNC: 75 MG/DL
LV EF: 43 %
LVEF MODALITY: NORMAL
LYMPHOCYTES # BLD: 1.4 K/UL (ref 1–5.1)
LYMPHOCYTES NFR BLD: 22.8 %
MCH RBC QN AUTO: 26.7 PG (ref 26–34)
MCHC RBC AUTO-ENTMCNC: 32.7 G/DL (ref 31–36)
MCV RBC AUTO: 81.5 FL (ref 80–100)
MONOCYTES # BLD: 1.3 K/UL (ref 0–1.3)
MONOCYTES NFR BLD: 21.7 %
NEUTROPHILS # BLD: 3.1 K/UL (ref 1.7–7.7)
NEUTROPHILS NFR BLD: 52.8 %
NT-PROBNP SERPL-MCNC: 123 PG/ML (ref 0–449)
PLATELET # BLD AUTO: 200 K/UL (ref 135–450)
PMV BLD AUTO: 8 FL (ref 5–10.5)
POTASSIUM SERPL-SCNC: 4.8 MMOL/L (ref 3.5–5.1)
POTASSIUM SERPL-SCNC: 5.1 MMOL/L (ref 3.5–5.1)
PROT SERPL-MCNC: 7.2 G/DL (ref 6.4–8.2)
PROTHROMBIN TIME: 14.1 SEC (ref 11.5–14.8)
RBC # BLD AUTO: 5.19 M/UL (ref 4.2–5.9)
SODIUM SERPL-SCNC: 139 MMOL/L (ref 136–145)
SODIUM SERPL-SCNC: 140 MMOL/L (ref 136–145)
TRIGL SERPL-MCNC: 192 MG/DL (ref 0–150)
TROPONIN, HIGH SENSITIVITY: 18 NG/L (ref 0–22)
TROPONIN, HIGH SENSITIVITY: 19 NG/L (ref 0–22)
TROPONIN, HIGH SENSITIVITY: 27 NG/L (ref 0–22)
TROPONIN, HIGH SENSITIVITY: 31 NG/L (ref 0–22)
TROPONIN, HIGH SENSITIVITY: 38 NG/L (ref 0–22)
TSH SERPL DL<=0.005 MIU/L-ACNC: 2.22 UIU/ML (ref 0.27–4.2)
VLDLC SERPL CALC-MCNC: 38 MG/DL
WBC # BLD AUTO: 5.9 K/UL (ref 4–11)

## 2023-08-22 PROCEDURE — 6370000000 HC RX 637 (ALT 250 FOR IP): Performed by: INTERNAL MEDICINE

## 2023-08-22 PROCEDURE — C8929 TTE W OR WO FOL WCON,DOPPLER: HCPCS

## 2023-08-22 PROCEDURE — 85610 PROTHROMBIN TIME: CPT

## 2023-08-22 PROCEDURE — 2580000003 HC RX 258: Performed by: INTERNAL MEDICINE

## 2023-08-22 PROCEDURE — 6370000000 HC RX 637 (ALT 250 FOR IP): Performed by: NURSE PRACTITIONER

## 2023-08-22 PROCEDURE — 84443 ASSAY THYROID STIM HORMONE: CPT

## 2023-08-22 PROCEDURE — 71045 X-RAY EXAM CHEST 1 VIEW: CPT

## 2023-08-22 PROCEDURE — 93010 ELECTROCARDIOGRAM REPORT: CPT | Performed by: INTERNAL MEDICINE

## 2023-08-22 PROCEDURE — 6360000002 HC RX W HCPCS: Performed by: NURSE PRACTITIONER

## 2023-08-22 PROCEDURE — 80061 LIPID PANEL: CPT

## 2023-08-22 PROCEDURE — 99223 1ST HOSP IP/OBS HIGH 75: CPT | Performed by: INTERNAL MEDICINE

## 2023-08-22 PROCEDURE — 85520 HEPARIN ASSAY: CPT

## 2023-08-22 PROCEDURE — 84484 ASSAY OF TROPONIN QUANT: CPT

## 2023-08-22 PROCEDURE — 6360000004 HC RX CONTRAST MEDICATION: Performed by: INTERNAL MEDICINE

## 2023-08-22 PROCEDURE — 83036 HEMOGLOBIN GLYCOSYLATED A1C: CPT

## 2023-08-22 PROCEDURE — 94640 AIRWAY INHALATION TREATMENT: CPT

## 2023-08-22 PROCEDURE — 85730 THROMBOPLASTIN TIME PARTIAL: CPT

## 2023-08-22 PROCEDURE — 93005 ELECTROCARDIOGRAM TRACING: CPT | Performed by: EMERGENCY MEDICINE

## 2023-08-22 PROCEDURE — 6370000000 HC RX 637 (ALT 250 FOR IP): Performed by: EMERGENCY MEDICINE

## 2023-08-22 PROCEDURE — 80053 COMPREHEN METABOLIC PANEL: CPT

## 2023-08-22 PROCEDURE — 85025 COMPLETE CBC W/AUTO DIFF WBC: CPT

## 2023-08-22 PROCEDURE — 2060000000 HC ICU INTERMEDIATE R&B

## 2023-08-22 PROCEDURE — 83880 ASSAY OF NATRIURETIC PEPTIDE: CPT

## 2023-08-22 PROCEDURE — 99285 EMERGENCY DEPT VISIT HI MDM: CPT

## 2023-08-22 PROCEDURE — 36415 COLL VENOUS BLD VENIPUNCTURE: CPT

## 2023-08-22 RX ORDER — ACETAMINOPHEN 650 MG/1
650 SUPPOSITORY RECTAL EVERY 6 HOURS PRN
Status: DISCONTINUED | OUTPATIENT
Start: 2023-08-22 | End: 2023-08-24

## 2023-08-22 RX ORDER — ALBUTEROL SULFATE 90 UG/1
2 AEROSOL, METERED RESPIRATORY (INHALATION) 4 TIMES DAILY PRN
Status: CANCELLED | OUTPATIENT
Start: 2023-08-22

## 2023-08-22 RX ORDER — OXYBUTYNIN CHLORIDE 5 MG/1
5 TABLET ORAL 2 TIMES DAILY
Status: DISCONTINUED | OUTPATIENT
Start: 2023-08-22 | End: 2023-08-28

## 2023-08-22 RX ORDER — HEPARIN SODIUM 1000 [USP'U]/ML
4000 INJECTION, SOLUTION INTRAVENOUS; SUBCUTANEOUS PRN
Status: DISCONTINUED | OUTPATIENT
Start: 2023-08-22 | End: 2023-08-28

## 2023-08-22 RX ORDER — HEPARIN SODIUM 10000 [USP'U]/100ML
1000 INJECTION, SOLUTION INTRAVENOUS CONTINUOUS
Status: DISCONTINUED | OUTPATIENT
Start: 2023-08-22 | End: 2023-08-23

## 2023-08-22 RX ORDER — SODIUM CHLORIDE 9 MG/ML
INJECTION, SOLUTION INTRAVENOUS PRN
Status: DISCONTINUED | OUTPATIENT
Start: 2023-08-22 | End: 2023-08-28

## 2023-08-22 RX ORDER — POLYETHYLENE GLYCOL 3350 17 G/17G
17 POWDER, FOR SOLUTION ORAL DAILY PRN
Status: DISCONTINUED | OUTPATIENT
Start: 2023-08-22 | End: 2023-08-28

## 2023-08-22 RX ORDER — IPRATROPIUM BROMIDE AND ALBUTEROL SULFATE 2.5; .5 MG/3ML; MG/3ML
1 SOLUTION RESPIRATORY (INHALATION) 4 TIMES DAILY PRN
Status: DISCONTINUED | OUTPATIENT
Start: 2023-08-22 | End: 2023-08-28

## 2023-08-22 RX ORDER — ATORVASTATIN CALCIUM 80 MG/1
80 TABLET, FILM COATED ORAL DAILY
Status: DISCONTINUED | OUTPATIENT
Start: 2023-08-22 | End: 2023-08-28

## 2023-08-22 RX ORDER — NITROGLYCERIN 0.4 MG/1
0.4 TABLET SUBLINGUAL EVERY 5 MIN PRN
Status: DISCONTINUED | OUTPATIENT
Start: 2023-08-22 | End: 2023-08-28

## 2023-08-22 RX ORDER — HEPARIN SODIUM 1000 [USP'U]/ML
4000 INJECTION, SOLUTION INTRAVENOUS; SUBCUTANEOUS ONCE
Status: COMPLETED | OUTPATIENT
Start: 2023-08-22 | End: 2023-08-22

## 2023-08-22 RX ORDER — CALCIUM CARBONATE 500 MG/1
1000 TABLET, CHEWABLE ORAL 3 TIMES DAILY PRN
Status: DISCONTINUED | OUTPATIENT
Start: 2023-08-22 | End: 2023-08-28

## 2023-08-22 RX ORDER — SODIUM CHLORIDE 0.9 % (FLUSH) 0.9 %
5-40 SYRINGE (ML) INJECTION EVERY 12 HOURS SCHEDULED
Status: DISCONTINUED | OUTPATIENT
Start: 2023-08-22 | End: 2023-08-25 | Stop reason: SDUPTHER

## 2023-08-22 RX ORDER — SODIUM CHLORIDE 0.9 % (FLUSH) 0.9 %
5-40 SYRINGE (ML) INJECTION PRN
Status: DISCONTINUED | OUTPATIENT
Start: 2023-08-22 | End: 2023-08-25 | Stop reason: SDUPTHER

## 2023-08-22 RX ORDER — ACETAMINOPHEN 325 MG/1
650 TABLET ORAL EVERY 6 HOURS PRN
Status: DISCONTINUED | OUTPATIENT
Start: 2023-08-22 | End: 2023-08-24

## 2023-08-22 RX ORDER — ASPIRIN 81 MG/1
162 TABLET, CHEWABLE ORAL ONCE
Status: COMPLETED | OUTPATIENT
Start: 2023-08-22 | End: 2023-08-22

## 2023-08-22 RX ORDER — LANOLIN ALCOHOL/MO/W.PET/CERES
3 CREAM (GRAM) TOPICAL NIGHTLY PRN
Status: DISCONTINUED | OUTPATIENT
Start: 2023-08-22 | End: 2023-08-28

## 2023-08-22 RX ORDER — ONDANSETRON 4 MG/1
4 TABLET, ORALLY DISINTEGRATING ORAL EVERY 8 HOURS PRN
Status: DISCONTINUED | OUTPATIENT
Start: 2023-08-22 | End: 2023-08-28

## 2023-08-22 RX ORDER — HEPARIN SODIUM 1000 [USP'U]/ML
2000 INJECTION, SOLUTION INTRAVENOUS; SUBCUTANEOUS PRN
Status: DISCONTINUED | OUTPATIENT
Start: 2023-08-22 | End: 2023-08-28

## 2023-08-22 RX ORDER — METHIMAZOLE 5 MG/1
5 TABLET ORAL DAILY
Status: DISCONTINUED | OUTPATIENT
Start: 2023-08-22 | End: 2023-08-28

## 2023-08-22 RX ORDER — ONDANSETRON 2 MG/ML
4 INJECTION INTRAMUSCULAR; INTRAVENOUS EVERY 6 HOURS PRN
Status: DISCONTINUED | OUTPATIENT
Start: 2023-08-22 | End: 2023-08-28

## 2023-08-22 RX ORDER — TAMSULOSIN HYDROCHLORIDE 0.4 MG/1
0.4 CAPSULE ORAL DAILY
Status: DISCONTINUED | OUTPATIENT
Start: 2023-08-22 | End: 2023-08-28

## 2023-08-22 RX ORDER — METOPROLOL SUCCINATE 25 MG/1
25 TABLET, EXTENDED RELEASE ORAL DAILY
Status: DISCONTINUED | OUTPATIENT
Start: 2023-08-22 | End: 2023-08-28

## 2023-08-22 RX ORDER — SODIUM CHLORIDE 9 MG/ML
INJECTION, SOLUTION INTRAVENOUS CONTINUOUS
Status: DISCONTINUED | OUTPATIENT
Start: 2023-08-22 | End: 2023-08-24

## 2023-08-22 RX ORDER — MORPHINE SULFATE 4 MG/ML
4 INJECTION, SOLUTION INTRAMUSCULAR; INTRAVENOUS
Status: DISCONTINUED | OUTPATIENT
Start: 2023-08-22 | End: 2023-08-22

## 2023-08-22 RX ORDER — ASPIRIN 81 MG/1
81 TABLET, CHEWABLE ORAL DAILY
Status: DISCONTINUED | OUTPATIENT
Start: 2023-08-23 | End: 2023-08-28

## 2023-08-22 RX ADMIN — OXYBUTYNIN CHLORIDE 5 MG: 5 TABLET ORAL at 20:01

## 2023-08-22 RX ADMIN — TIOTROPIUM BROMIDE INHALATION SPRAY 2 PUFF: 3.12 SPRAY, METERED RESPIRATORY (INHALATION) at 07:53

## 2023-08-22 RX ADMIN — METOPROLOL SUCCINATE 25 MG: 25 TABLET, EXTENDED RELEASE ORAL at 10:04

## 2023-08-22 RX ADMIN — SODIUM CHLORIDE: 9 INJECTION, SOLUTION INTRAVENOUS at 11:48

## 2023-08-22 RX ADMIN — NITROGLYCERIN 1 INCH: 20 OINTMENT TOPICAL at 03:04

## 2023-08-22 RX ADMIN — PERFLUTREN 1.5 ML: 6.52 INJECTION, SUSPENSION INTRAVENOUS at 10:11

## 2023-08-22 RX ADMIN — HEPARIN SODIUM 1000 UNITS/HR: 10000 INJECTION, SOLUTION INTRAVENOUS at 06:52

## 2023-08-22 RX ADMIN — ASPIRIN 162 MG: 81 TABLET, CHEWABLE ORAL at 03:03

## 2023-08-22 RX ADMIN — METHIMAZOLE 5 MG: 5 TABLET ORAL at 11:51

## 2023-08-22 RX ADMIN — SERTRALINE 100 MG: 50 TABLET, FILM COATED ORAL at 10:04

## 2023-08-22 RX ADMIN — ATORVASTATIN CALCIUM 80 MG: 80 TABLET, FILM COATED ORAL at 10:04

## 2023-08-22 RX ADMIN — Medication 2 PUFF: at 20:04

## 2023-08-22 RX ADMIN — OXYBUTYNIN CHLORIDE 5 MG: 5 TABLET ORAL at 10:04

## 2023-08-22 RX ADMIN — SODIUM CHLORIDE: 9 INJECTION, SOLUTION INTRAVENOUS at 20:01

## 2023-08-22 RX ADMIN — Medication 2 PUFF: at 07:53

## 2023-08-22 RX ADMIN — NITROGLYCERIN 0.4 MG: 0.4 TABLET, ORALLY DISINTEGRATING SUBLINGUAL at 21:54

## 2023-08-22 RX ADMIN — HEPARIN SODIUM 4000 UNITS: 1000 INJECTION INTRAVENOUS; SUBCUTANEOUS at 06:50

## 2023-08-22 ASSESSMENT — PAIN SCALES - GENERAL
PAINLEVEL_OUTOF10: 0
PAINLEVEL_OUTOF10: 0

## 2023-08-22 NOTE — CONSULTS
Pharmacy to Manage Heparin Infusion per [facility]    Dx: Chest Pain  Pt wt = 84.4 kg  Baseline aPTT =     Oral factor Xa-inhibitors may alter and elevate anti-Xa levels used for unfractionated heparin monitoring. As a result, anti-Xa monitoring is not accurate while Xa-inhibitor activity is detectable. Utilize aPTT monitoring when patient received an oral factor Xa-inhibitor (apixaban, betrixaban, edoxaban or rivaroxaban) within 72 hours prior to admission (please document last administration time). The goal is to allow a washout of oral factor Xa-inhibitors by using aPTT for 72 hours, then change to ant-Xa levels for UFH. Heparin (weight-based) Infusion: CAD/STEMI/NSTEMI/UA/AFib)   Heparin 60 units/kg IVP bolus (max 4,000 units) followed by Heparin infusion at 12 units/kg/hr (recommended max initial rate: 1000 units/hr). Recheck anti-Xa (unless aPTT being used) in 6 hours. Goal anti-Xa 0.3-0.7 IU/mL  Goal aPTT =  seconds. Ludy Garcia Pharm D.8/22/2023 5:18 AM    8/22/2023 at 1112  Anti-Xa Unfrac Heparin   0.64   IU/mL  - No change to Heparin at this time, continue Heparin infusion at _1000_ units/hr. - Recheck Anti-Xa in 6 hours at [address] PharmD  8/22/2023   11:52 AM    ______________________________________________________________    8/22 1719  Anti-Xa - 0.51  Continue heparin infusion at 1000 units/hr  Next anti-Xa 8/23 AM  Jana DelcidD 8/22/2023 5:59 PM    8/23/2023 at 0448  Anti-Xa Unfrac Heparin   0.48   IU/mL  - No change to Heparin at this time, continue Heparin infusion at _1000_ units/hr.   - Recheck Anti-Xa daily  Ludy Garcia Pharm D.8/23/2023 5:30 AM    -------------------------------  8/24 0541  Low-Dose Heparin Drip  Current Rate: 1000 units/hr  8/24 @ 0506 Anti-Xa Level= 0.40  Plan: Per pharmacy dosing, we will not make any changes at this time    Next Anti-Xa Level: 8/25 @ 0600  Jose A Julien, PharmD 8/24/2023 5:41

## 2023-08-22 NOTE — H&P
Hospital Medicine  History & Physical    Patient: Trinity Haley  :  1950  MRN:  [id number]    Date of Service: 23    Chief Complaint   Patient presents with    Chest Pain     Pt is coming in with a chief complaint of chest pain that started earlier today and an hr ago. Pt wife states that he has had it for a couple of days; pt states pain is a 7/10 at worst; Pt has a history of a MI back in . HISTORY OF PRESENT ILLNESS:    Trinity Haley is a 74 y.o. male. He presented to the ER from home for chest discomfort. 2-3 weeks of intermittent chest discomfort. He describes a squeezing burning left precordial discomfort which lasts 4-5 minutes. At times it has been intense and very uncomfortable. He has not taken anything for it like SL NTG. Patient has a h/o MI in  and PCI of the RCA. Review of Systems:  All pertinent positives and negatives are as noted in the HPI section. All other systems were reviewed and are negative. Past Medical History:   Diagnosis Date    CAD (coronary artery disease)     Cancer ([address] )     BLADDER    COPD (chronic obstructive pulmonary disease) (HCC)     Depression     ADJUSTMENT DISORDER    Heart attack ([address]) 10/10    History of renal calculi     Hyperlipidemia     Hypertension     Hyperthyroidism     RBBB        Past Surgical History:   Procedure Laterality Date    CARDIAC SURGERY      2 stents    COLONOSCOPY  14    colonoscopy and egd    CYSTOSCOPY      CYSTOSCOPY Left 10/22/2021    CYSTOSCOPY, TRANSURETHRAL RESECTION OF BLADDER TUMOR, LEFT STENT REMOVAL performed by Tanner Sheppard MD at [facility]         Prior to Admission medications    Medication Sig Start Date End Date Taking?  Authorizing Provider   fluticasone-umeclidin-vilant (TRELEGY ELLIPTA) 200-62.5-25 MCG/ACT AEPB inhaler Inhale 1 puff into the lungs daily 23   Keith Beach MD   fluticasone-salmeterol (ADVAIR) 250-50 MCG/DOSE AEPB Inhale 1 puff into the lungs in the morning and Significant Incidental Findings () Management:  Diagnostic chest CT with or without contrast; PET/CT may be considered if there is a >= 8 mm solid nodule or solid component; tissue sampling; and/or referral for further clinical evaluation. Management depends on clinical evaluation, patient preference, and the probability of malignancy. Additional finding of bilateral renal cortical hyperattenuating lesions. Recommend renal mass protocol CT or MRI for more definitive evaluation. I directly reviewed all recent imaging studies as well as pertinent prior studies. Radiology reports may or may not be available at the time of my review. EKG:  New and pertinent prior tracings were directly reviewed. My interpretation is as follows:  Normal sinus rhythm with RBBB. Dyanmic ST changes w/ intermittent down-sloping ST depression in V2-6 and II, aVF. Active Hospital Problems    Diagnosis Date Noted    Chest pain [R07.9] 08/22/2023       ASSESSMENT & PLAN  Patient was discussed with Dr. Wei Garcia in the ER.    UA/NSTEMI, CAD s/p PCI  -  H/O MI in 2010 w/ PCI to RCA. Currently having intermittent anginal pain w/ associated dynamic ST/T changes. -  UFH gtt . Continue home ASA, statin, toprol XL.  -  Keep NPO, request echo, and request cardiology assistance. COPD  -  No PFT's could be found. Follows with Dr. Orlene Boxer. -  Titrate level of respiratory support according to patient's needs. Target goal SpO2 = 90-94%. Currently patient is requiring 2 L/min O2 support. At baseline the patient requires no support. -  Continue home Trelegy.  -  Short acting bronchodilators made available on an as-needed basis. Hyperthyroidism  -  Dr. Bernard Ochoa through [facility].  -  Methimazole 5mg daily. Bladder CA  -  Follows with Dr. Radha Silva. S/P TURBT and BCG. DVT prophylaxis: SCDs, UFH gtt   Code Status:  Full  Surrogate/HCPOA: Spouse  Disposition:  Inpatient w/ eventual d/c back to home anticipated.     Mily Angelo MD

## 2023-08-22 NOTE — CONSULTS
Patient seen and examined, consult note dictated. Assessment and Plan:    1- A/CKD: The patient has chronic kidney disease with a baseline creatinine of 1.3 to 1.5 mg/dl. His GAYATHRI is likely secondary to a pre-renal component, although a non oliguric ATN cannot be ruled out. His urinary output is well maintained and his serum creatinine is slowly trending down. - Continue volume expansion.  - Maintain systolic blood pressure > 120 mmHg. - Avoid all nephrotoxic agents at this time including IV contrast.  2- No significant electrolytes disorders noted. 3- HTN: Blood pressure within acceptable range. 4- Chest pain: Management per cardiology.

## 2023-08-22 NOTE — CONSULTS
Consult Placed     Who: [facility]  Date:  [id number]     Electronically signed by Kay Del Castillo on 8/22/2023 at 8:01 AM

## 2023-08-22 NOTE — ED PROVIDER NOTES
Emergency Physician Note  [address] [address] PCU    Pt Name: Son Velez  MRN: [id number]  [address] [facility] [place] 1950  Date of evaluation: 8/22/2023  Provider: Maritza Santos MD  PCP: CHRIS Ortiz    Note Open Time: 3:33 AM EDT 8/22/23    Chief Complaint  Chest Pain (Pt is coming in with a chief complaint of chest pain that started earlier today and an hr ago. Pt wife states that he has had it for a couple of days; pt states pain is a 7/10 at worst; Pt has a history of a MI back in 2010.)       History of Present Illness  Son Velez is a 74 y.o. male who presents to the ED for chest pain. Patient reports off-and-on episodes of chest pain that would last about 5 minutes for the last 3 weeks that have become more frequent and more intense over the last day including overnight tonight. 1 tonight was more severe which prompted his presentation. At the time of presentation he had chest pain but that time I came to the room this pain had resolved. The pain is in the central part of the chest it does not radiate. It is associated with some slight shortness of breath and a tightness feeling but no nausea, palpitations or diaphoresis. He has a history of coronary disease with stents placed in 2010. No interventions since then. History from : Patient    Limitations to history : None    REVIEW OF SYSTEMS :      Review of Systems    Positives and Pertinent negatives as per HPI. Medications/allergies/medical/social/family history  I have reviewed the following from the nursing documentation:      Prior to Admission medications    Medication Sig Start Date End Date Taking?  Authorizing Provider   fluticasone-umeclidin-vilant (TRELEGY ELLIPTA) 200-62.5-25 MCG/ACT AEPB inhaler Inhale 1 puff into the lungs daily 8/18/23   Erica Candelario MD   fluticasone-salmeterol (ADVAIR) 250-50 MCG/DOSE AEPB Inhale 1 puff into the lungs in the morning and 1 puff in the evening. 7/26/23   Godfrey Mejia,    lisinopril (PRINIVIL;ZESTRIL) 20 Patient has dynamic changes in the ER here. If his initial EKG had corresponded with chest pain at the time of my evaluation I likely would have called a STEMI alert for possible posterior MI. However his pain resolved and his EKG normalized by the time I mated to the room and for this reason STEMI alert was not called. I did order heparin however. He also reports taking 162 mg of aspirin prior to arrival tonight so an additional 162 mg ordered along with nitroglycerin and morphine for pain. Differential Diagnosis: ACS,  aortic dissection, PE, Boerhaave syndrome, pneumothorax      The total Critical Care time is 45 minutes which excludes separately billable procedures. The critical care was concerning treatment of unstable angina with IV heparin. This time is exclusive of any time documented by any other providers. I am the Primary Clinician of Record. FINAL IMPRESSION      1. Chest pain, unspecified type          DISPOSITION/PLAN     Pt is in stable condition upon Admit to telemetry. PATIENT REFERRED TO:  No follow-up provider specified. DISCHARGE MEDICATIONS:  Current Discharge Medication List          DISCONTINUED MEDICATIONS:  Current Discharge Medication List                 (Please note that portions of this note were completed with a voice recognition program.  Efforts were made to edit the dictations but occasionally words are mis-transcribed.)    Katiana Gay MD (electronically signed)          This chart was generated using the Screwpulpation system. I created this record but it may contain dictation errors.           Katiana Gay MD  08/22/23 0917

## 2023-08-22 NOTE — CONSULTS
[address] [place] [address]  [phone]      Attending Physician: Joey Godoy MD  Reason for Consultation/Chief Complaint: Chest pain    Subjective   History of Present Illness:  Lorena Ferrer is a 74 y.o. patient who presented to the hospital with complaints of chest pain, he describes this as mainly as a burning sensation over to the middle to left side of his chest, he says is mainly has been occurring at night and not so much so when he has been exerting himself in the daytime as he has been working in his barn. He notes associated shortness of breath but no nausea or vomiting or sweating. He says symptoms are dissimilar to prior symptoms of angina when he just felt weak and he has a cardiac history which dates back to 2010, underwent catheterization with PCI of RCA with Xience drug-eluting stents. Patient had been following with Dr. Tammi Holden from [facility] but ultimately transition to Dr. Leroy Kelsey and last saw him on July 3, 2023 and he was felt to be stable overall with regard to chronic medical additions going hypertension hypercholesterolemia. He had not had recent ischemic evaluation since 2068-3302. He had been diagnosed with bladder cancer and had done well with surgery and adjuvant therapy. There were concerns for a possible lung cancer, he recently was followed up by pulmonary with Dr. Sindi Anthony, had a PET scan and he says he was noted to have more than likely an infection rather than cancer. Since admission to hospital, troponin levels have been elevated, he has had no further chest pain, hs sensitive troponin levels were at 19 and then 31 and 38. Past Medical History:   has a past medical history of CAD (coronary artery disease), Cancer ([address]), COPD (chronic obstructive pulmonary disease) ([address]), Depression, Heart attack ([address]), History of renal calculi, Hyperlipidemia, Hypertension, Hyperthyroidism, and RBBB.     Surgical History:   has a past surgical history that includes CARDIAC CATHETERIZATION PROCEDURES: The procedures, indications, risks and alternatives have been discussed with the patient and, as appropriate, with the patient's guardian . Risks discussed included, but are not limited to, bleeding, development of blood clots/emboli, damage to blood vessels, renal failure, malignant cardiac arrhythmias, stroke, heart attack, emergent coronary bypass surgery, death, dye allergy. The patient (and guardian as appropriate) expressed understanding of the aforementioned and wished to proceed. Thank you for allowing us to participate in the care of Earlene Palomares. Please call me with any questions [phone].     Lashell Tracy MD, [facility]   Interventional Cardiologist  [facility], [place]  [phone] [facility]  [phone] [address] [facility]  8/22/2023 8:18 AM

## 2023-08-22 NOTE — PROGRESS NOTES
Patient seen and evaluated   Agree with assessment and plan of my colleague    Cardiology and nephrology consulted    Continue with presently resolved  Labs in a.m.

## 2023-08-22 NOTE — CARE COORDINATION
Case Management Assessment  Initial Evaluation    Date/Time of Evaluation: 8/22/2023 10:30 AM  Assessment Completed by: Maggie Singh RN    If patient is discharged prior to next notation, then this note serves as note for discharge by case management. Patient Name: Serena Fisher                   YOB: 1950  Diagnosis: Chest pain [R07.9]  Chest pain, unspecified type [R07.9]                   Date / Time: 8/22/2023  2:33 AM    Patient Admission Status: Inpatient   Readmission Risk (Low < 19, Mod (19-27), High > 27): Readmission Risk Score: 10.9    Current PCP: CHRIS Azar  PCP verified by CM? Yes    Chart Reviewed: Yes      History Provided by: Patient  Patient Orientation: Alert and Oriented, Person, Place, Situation, Self    Patient Cognition: Alert    Hospitalization in the last 30 days (Readmission):  No    If yes, Readmission Assessment in CM Navigator will be completed. Advance Directives:      Code Status: Full Code   Patient's Primary Decision Maker is: Patient Declined (Legal Next of Kin Remains as Decision Maker)    Primary Decision Maker: Brianna Alicia - Spouse - [phone]    Discharge Planning:    Patient lives with: Spouse/Significant Other Type of Home: Trailer/Mobile Home  Primary Care Giver: Self  Patient Support Systems include: Spouse/Significant Other   Current Financial resources: Medicare  Current community resources: None  Current services prior to admission: None            Current DME:              Type of Home Care services:  None    ADLS  Prior functional level: Independent in ADLs/IADLs  Current functional level: Independent in ADLs/IADLs    PT AM-PAC:   /24  OT AM-PAC:   /24    Family can provide assistance at DC: Yes  Would you like Case Management to discuss the discharge plan with any other family members/significant others, and if so, who?  No  Plans to Return to Present Housing: Yes  Other Identified Issues/Barriers to RETURNING to current housing: NONE  Potential Assistance needed at discharge: N/A            Potential DME:    Patient expects to discharge to: [address] [facility] for transportation at discharge: Self    Financial    Payor: Laura Paniagua / Plan: Annie Ledezma HMO / Product Type: *No Product type* /     Does insurance require precert for SNF: Yes    Potential assistance Purchasing Medications: No  Meds-to-Beds request: Yes      [facility] [address], [address], [place] [phone]  [address] [place] [address] [address]   Phone: [phone] Fax: [phone]    [facility] [id number] - [facility] - [address] [facility] [phone] - f [phone] [address] [facility] [address]  Phone: [phone] Fax: [phone]    [address] [place] [address] 54  [address] [facility] [address]  Phone: [phone] Fax: [phone]      Notes:    Factors facilitating achievement of predicted outcomes: Family support, Motivated, Cooperative, Pleasant, and Sense of humor    Barriers to discharge: testing, o2 requirement, heparin drip    Additional Case Management Notes: a&o x4. Pt IPTA w/spouse in a double wide home w/3 SARAHI. Pt drives and reports no issues w/getting to appointments. Pt w/a hx of MI, COPD and Bladder ca. Possible HHC need? Pt is followed by IM, Cards and Nephro. Pt awaiting echo results and currently is requiring o2 and a heparin drip. Will follow. The Plan for Transition of Care is related to the following treatment goals of Chest pain [R07.9]  Chest pain, unspecified type [Q34.6]    IF APPLICABLE: The Patient and/or patient representative Manuela Haney and his family were provided with a choice of provider and agrees with the discharge plan.  Freedom of choice list with basic dialogue that supports the patient's individualized plan of care/goals and shares the quality data associated with the providers was provided to:

## 2023-08-22 NOTE — PROGRESS NOTES
New admit from ER with chest pain. Pt with no c/o of chest pain at this time. Oriented to room and given call light. Pt is on room air. Normal sinus rhythm on the monitor. Cardio to see. ECHO ordered. Will be starting heparin drip. NPO except ice chips and sips with meds. Plan reviewed with patient. Verbalizes understanding.

## 2023-08-22 NOTE — ED NOTES
Telephone report called to C4 RN. Dr. Trish Carlos notified of 2nd troponin result of 31.      Shoaib Duran, AMOR  08/22/23 [id number]

## 2023-08-22 NOTE — CONSULTS
[address] [place] [address]                                  CONSULTATION    PATIENT NAME: Jovita Neal                        :        1950  MED REC NO:   [id number]                          ROOM:       78  ACCOUNT NO:   [de-identified]                           ADMIT DATE: 2023  PROVIDER:     Rahul Betancur MD    CONSULT DATE:  2023    REASON FOR CONSULTATION:  Acute-on-chronic kidney disease. HISTORY OF PRESENT ILLNESS:  The patient is a 75-year-old  male  patient with a past medical history significant for chronic kidney  disease and a baseline creatinine ranging between 1.3 and 1.5 mg/dL. The patient presented to [facility] complaining of chest  pain and was noted to have an elevated troponin levels. He was also  noted to have an acute-on-chronic kidney injury with a serum creatinine  of 2.2 mg/dL, which prompted Nephrology consultation. PAST MEDICAL HISTORY:  1. Coronary artery disease. 2.  Bladder cancer. 3.  COPD. 4.  Chronic kidney disease. 5.  Nephrolithiasis. 6.  Hypertension. 7.  Hyperlipidemia. 8.  Hyperthyroidism. PAST SURGICAL HISTORY:  1. Coronary artery stent placement. 2.  Colonoscopy. 3.  EGD. 4.  Cystoscopy. ALLERGIES:  The patient is allergic to PENICILLIN. SOCIAL HISTORY:  The patient quit smoking a few years ago and does not  drink alcohol. FAMILY HISTORY:  Significant for hypertension, hyperlipidemia and  coronary artery disease in his mother and father. REVIEW OF SYSTEMS:  The patient denied any fever, chills, cough or  expectorations. Otherwise, a 10-point review of systems was relatively  unremarkable. PHYSICAL EXAMINATION:  VITAL SIGNS:  Blood pressure 117/61, heart rate 72, respirations 16,  temperature 97.8 Fahrenheit. The patient is satting 95% on room air.   GENERAL APPEARANCE:  The patient is alert and oriented x3, not in acute  distress. HEENT:  Eyes revealed normal conjunctivae and reactive pupils. NECK:  Revealed midline trachea, nonpalpable thyroid. LUNGS:  Clear to anterior auscultation bilaterally, nonlabored  breathing. CARDIOVASCULAR EXAM:  Revealed S1 and S2, regular rate and rhythm. No  added murmurs or rubs. No peripheral edema. ABDOMINAL EXAM:  Revealed soft abdomen. No organomegaly. SKIN:  Revealed no lesions or rashes. Warm to touch. PSYCHIATRIC:  Revealed good judgment and insight. LYMPHATICS:  Revealed no cervical or axillary adenopathies. ASSESSMENT AND PLAN:  1. Acute-on-chronic kidney disease. The patient has chronic kidney  disease with a baseline creatinine of 1.3 to 1.5 mg/dL. His acute  kidney injury is likely secondary to a prerenal component, although a  nonoliguric ATN injury cannot be ruled out at this time. His urine  output is well-maintained and his serum creatinine is slowly trending  down. RECOMMENDATIONS:  1. Continue volume expansion. 2.  Maintain systolic blood pressure above 120 mmHg. 3.  Avoid all nephrotoxic agents at this time including IV contrast.  4.  No significant electrolyte disorders noted. 5.  Hypertension, blood pressure within acceptable range. 6.  Chest pain, management per Cardiology.         Bryanna Cannon MD    D: 08/22/2023 10:52:31       T: 08/22/2023 10:56:31     [email or web address]  Job#: [id number]     Doc#: [id number]    CC:

## 2023-08-22 NOTE — CONSULTS
Consult Placed     Who: / Nephrology  Date:  Time:     Electronically signed by Cecilia Simmons on 8/22/2023 at 8:48 AM

## 2023-08-23 ENCOUNTER — APPOINTMENT (OUTPATIENT)
Dept: CARDIAC CATH/INVASIVE PROCEDURES | Age: 73
End: 2023-08-23
Payer: MEDICARE

## 2023-08-23 PROBLEM — N17.0 ACUTE RENAL FAILURE WITH ACUTE TUBULAR NECROSIS SUPERIMPOSED ON STAGE 3A CHRONIC KIDNEY DISEASE (HCC): Status: ACTIVE | Noted: 2023-08-23

## 2023-08-23 PROBLEM — N18.31 ACUTE RENAL FAILURE WITH ACUTE TUBULAR NECROSIS SUPERIMPOSED ON STAGE 3A CHRONIC KIDNEY DISEASE (HCC): Status: ACTIVE | Noted: 2023-08-23

## 2023-08-23 PROBLEM — I21.4 NSTEMI (NON-ST ELEVATED MYOCARDIAL INFARCTION) (HCC): Status: ACTIVE | Noted: 2023-08-23

## 2023-08-23 LAB
ANION GAP SERPL CALCULATED.3IONS-SCNC: 6 MMOL/L (ref 3–16)
ANION GAP SERPL CALCULATED.3IONS-SCNC: 9 MMOL/L (ref 3–16)
ANTI-XA UNFRAC HEPARIN: 0.48 IU/ML (ref 0.3–0.7)
BASOPHILS # BLD: 0 K/UL (ref 0–0.2)
BASOPHILS NFR BLD: 0.4 %
BUN SERPL-MCNC: 26 MG/DL (ref 7–20)
BUN SERPL-MCNC: 32 MG/DL (ref 7–20)
CALCIUM SERPL-MCNC: 8.4 MG/DL (ref 8.3–10.6)
CALCIUM SERPL-MCNC: 8.7 MG/DL (ref 8.3–10.6)
CHLORIDE SERPL-SCNC: 108 MMOL/L (ref 99–110)
CHLORIDE SERPL-SCNC: 108 MMOL/L (ref 99–110)
CO2 SERPL-SCNC: 18 MMOL/L (ref 21–32)
CO2 SERPL-SCNC: 23 MMOL/L (ref 21–32)
CREAT SERPL-MCNC: 1.2 MG/DL (ref 0.8–1.3)
CREAT SERPL-MCNC: 1.5 MG/DL (ref 0.8–1.3)
DEPRECATED RDW RBC AUTO: 17.9 % (ref 12.4–15.4)
EKG ATRIAL RATE: 74 BPM
EKG DIAGNOSIS: NORMAL
EKG P AXIS: 52 DEGREES
EKG P-R INTERVAL: 128 MS
EKG Q-T INTERVAL: 404 MS
EKG QRS DURATION: 118 MS
EKG QTC CALCULATION (BAZETT): 448 MS
EKG R AXIS: 32 DEGREES
EKG T AXIS: -2 DEGREES
EKG VENTRICULAR RATE: 74 BPM
EOSINOPHIL # BLD: 0.1 K/UL (ref 0–0.6)
EOSINOPHIL NFR BLD: 1.2 %
GFR SERPLBLD CREATININE-BSD FMLA CKD-EPI: 48 ML/MIN/{1.73_M2}
GFR SERPLBLD CREATININE-BSD FMLA CKD-EPI: >60 ML/MIN/{1.73_M2}
GLUCOSE SERPL-MCNC: 86 MG/DL (ref 70–99)
GLUCOSE SERPL-MCNC: 92 MG/DL (ref 70–99)
HCT VFR BLD AUTO: 37.7 % (ref 40.5–52.5)
HGB BLD-MCNC: 12.3 G/DL (ref 13.5–17.5)
LYMPHOCYTES # BLD: 1.4 K/UL (ref 1–5.1)
LYMPHOCYTES NFR BLD: 29.1 %
MAGNESIUM SERPL-MCNC: 2 MG/DL (ref 1.8–2.4)
MCH RBC QN AUTO: 26.6 PG (ref 26–34)
MCHC RBC AUTO-ENTMCNC: 32.6 G/DL (ref 31–36)
MCV RBC AUTO: 81.5 FL (ref 80–100)
MONOCYTES # BLD: 1 K/UL (ref 0–1.3)
MONOCYTES NFR BLD: 21.4 %
NEUTROPHILS # BLD: 2.3 K/UL (ref 1.7–7.7)
NEUTROPHILS NFR BLD: 47.9 %
PLATELET # BLD AUTO: 182 K/UL (ref 135–450)
PMV BLD AUTO: 8.1 FL (ref 5–10.5)
POTASSIUM SERPL-SCNC: 4.6 MMOL/L (ref 3.5–5.1)
POTASSIUM SERPL-SCNC: 5.6 MMOL/L (ref 3.5–5.1)
RBC # BLD AUTO: 4.62 M/UL (ref 4.2–5.9)
SODIUM SERPL-SCNC: 135 MMOL/L (ref 136–145)
SODIUM SERPL-SCNC: 137 MMOL/L (ref 136–145)
WBC # BLD AUTO: 4.8 K/UL (ref 4–11)

## 2023-08-23 PROCEDURE — 93459 L HRT ART/GRFT ANGIO: CPT

## 2023-08-23 PROCEDURE — 2709999900 HC NON-CHARGEABLE SUPPLY: Performed by: INTERNAL MEDICINE

## 2023-08-23 PROCEDURE — 93459 L HRT ART/GRFT ANGIO: CPT | Performed by: INTERNAL MEDICINE

## 2023-08-23 PROCEDURE — 6370000000 HC RX 637 (ALT 250 FOR IP): Performed by: INTERNAL MEDICINE

## 2023-08-23 PROCEDURE — 6360000002 HC RX W HCPCS: Performed by: INTERNAL MEDICINE

## 2023-08-23 PROCEDURE — 6360000002 HC RX W HCPCS: Performed by: NURSE PRACTITIONER

## 2023-08-23 PROCEDURE — 85520 HEPARIN ASSAY: CPT

## 2023-08-23 PROCEDURE — 99232 SBSQ HOSP IP/OBS MODERATE 35: CPT | Performed by: NURSE PRACTITIONER

## 2023-08-23 PROCEDURE — C1769 GUIDE WIRE: HCPCS | Performed by: INTERNAL MEDICINE

## 2023-08-23 PROCEDURE — 80048 BASIC METABOLIC PNL TOTAL CA: CPT

## 2023-08-23 PROCEDURE — 2580000003 HC RX 258: Performed by: INTERNAL MEDICINE

## 2023-08-23 PROCEDURE — 2060000000 HC ICU INTERMEDIATE R&B

## 2023-08-23 PROCEDURE — B2111ZZ FLUOROSCOPY OF MULTIPLE CORONARY ARTERIES USING LOW OSMOLAR CONTRAST: ICD-10-PCS | Performed by: INTERNAL MEDICINE

## 2023-08-23 PROCEDURE — 2500000003 HC RX 250 WO HCPCS

## 2023-08-23 PROCEDURE — 6360000002 HC RX W HCPCS

## 2023-08-23 PROCEDURE — 2580000003 HC RX 258

## 2023-08-23 PROCEDURE — 4A023N7 MEASUREMENT OF CARDIAC SAMPLING AND PRESSURE, LEFT HEART, PERCUTANEOUS APPROACH: ICD-10-PCS | Performed by: INTERNAL MEDICINE

## 2023-08-23 PROCEDURE — 99152 MOD SED SAME PHYS/QHP 5/>YRS: CPT | Performed by: INTERNAL MEDICINE

## 2023-08-23 PROCEDURE — 85025 COMPLETE CBC W/AUTO DIFF WBC: CPT

## 2023-08-23 PROCEDURE — 93005 ELECTROCARDIOGRAM TRACING: CPT | Performed by: INTERNAL MEDICINE

## 2023-08-23 PROCEDURE — 36415 COLL VENOUS BLD VENIPUNCTURE: CPT

## 2023-08-23 PROCEDURE — 83735 ASSAY OF MAGNESIUM: CPT

## 2023-08-23 PROCEDURE — 6360000004 HC RX CONTRAST MEDICATION

## 2023-08-23 PROCEDURE — 94640 AIRWAY INHALATION TREATMENT: CPT

## 2023-08-23 PROCEDURE — 6370000000 HC RX 637 (ALT 250 FOR IP): Performed by: NURSE PRACTITIONER

## 2023-08-23 PROCEDURE — 85347 COAGULATION TIME ACTIVATED: CPT

## 2023-08-23 PROCEDURE — C1894 INTRO/SHEATH, NON-LASER: HCPCS | Performed by: INTERNAL MEDICINE

## 2023-08-23 RX ORDER — MIDAZOLAM HYDROCHLORIDE 1 MG/ML
INJECTION INTRAMUSCULAR; INTRAVENOUS
Status: COMPLETED | OUTPATIENT
Start: 2023-08-23 | End: 2023-08-23

## 2023-08-23 RX ORDER — LORAZEPAM 0.5 MG/1
0.5 TABLET ORAL
Status: DISCONTINUED | OUTPATIENT
Start: 2023-08-23 | End: 2023-08-24

## 2023-08-23 RX ORDER — SODIUM CHLORIDE 0.9 % (FLUSH) 0.9 %
5-40 SYRINGE (ML) INJECTION PRN
Status: DISCONTINUED | OUTPATIENT
Start: 2023-08-23 | End: 2023-08-23

## 2023-08-23 RX ORDER — ONDANSETRON 2 MG/ML
4 INJECTION INTRAMUSCULAR; INTRAVENOUS EVERY 6 HOURS PRN
Status: DISCONTINUED | OUTPATIENT
Start: 2023-08-23 | End: 2023-08-23 | Stop reason: SDUPTHER

## 2023-08-23 RX ORDER — SODIUM CHLORIDE 0.9 % (FLUSH) 0.9 %
5-40 SYRINGE (ML) INJECTION EVERY 12 HOURS SCHEDULED
Status: DISCONTINUED | OUTPATIENT
Start: 2023-08-23 | End: 2023-08-28

## 2023-08-23 RX ORDER — SODIUM CHLORIDE 0.9 % (FLUSH) 0.9 %
5-40 SYRINGE (ML) INJECTION PRN
Status: DISCONTINUED | OUTPATIENT
Start: 2023-08-23 | End: 2023-08-28

## 2023-08-23 RX ORDER — SODIUM CHLORIDE 9 MG/ML
INJECTION, SOLUTION INTRAVENOUS PRN
Status: DISCONTINUED | OUTPATIENT
Start: 2023-08-23 | End: 2023-08-28 | Stop reason: HOSPADM

## 2023-08-23 RX ORDER — SODIUM CHLORIDE 0.9 % (FLUSH) 0.9 %
5-40 SYRINGE (ML) INJECTION EVERY 12 HOURS SCHEDULED
Status: DISCONTINUED | OUTPATIENT
Start: 2023-08-23 | End: 2023-08-23

## 2023-08-23 RX ORDER — HEPARIN SODIUM 10000 [USP'U]/100ML
1170 INJECTION, SOLUTION INTRAVENOUS CONTINUOUS
Status: DISCONTINUED | OUTPATIENT
Start: 2023-08-23 | End: 2023-08-28

## 2023-08-23 RX ORDER — NITROGLYCERIN 20 MG/100ML
5-200 INJECTION INTRAVENOUS CONTINUOUS
Status: DISCONTINUED | OUTPATIENT
Start: 2023-08-23 | End: 2023-08-28

## 2023-08-23 RX ORDER — FENTANYL CITRATE 50 UG/ML
INJECTION, SOLUTION INTRAMUSCULAR; INTRAVENOUS
Status: COMPLETED | OUTPATIENT
Start: 2023-08-23 | End: 2023-08-23

## 2023-08-23 RX ORDER — ACETAMINOPHEN 325 MG/1
650 TABLET ORAL EVERY 4 HOURS PRN
Status: DISCONTINUED | OUTPATIENT
Start: 2023-08-23 | End: 2023-08-28

## 2023-08-23 RX ORDER — SODIUM CHLORIDE 9 MG/ML
INJECTION, SOLUTION INTRAVENOUS ONCE
Status: DISCONTINUED | OUTPATIENT
Start: 2023-08-23 | End: 2023-08-23

## 2023-08-23 RX ORDER — HEPARIN SODIUM 1000 [USP'U]/ML
INJECTION, SOLUTION INTRAVENOUS; SUBCUTANEOUS
Status: COMPLETED | OUTPATIENT
Start: 2023-08-23 | End: 2023-08-23

## 2023-08-23 RX ORDER — SODIUM CHLORIDE 9 MG/ML
INJECTION, SOLUTION INTRAVENOUS PRN
Status: DISCONTINUED | OUTPATIENT
Start: 2023-08-23 | End: 2023-08-28

## 2023-08-23 RX ORDER — ASPIRIN 81 MG/1
162 TABLET, CHEWABLE ORAL ONCE
Status: COMPLETED | OUTPATIENT
Start: 2023-08-23 | End: 2023-08-23

## 2023-08-23 RX ADMIN — METHIMAZOLE 5 MG: 5 TABLET ORAL at 08:21

## 2023-08-23 RX ADMIN — NITROGLYCERIN 0.4 MG: 0.4 TABLET, ORALLY DISINTEGRATING SUBLINGUAL at 08:00

## 2023-08-23 RX ADMIN — NITROGLYCERIN 0.4 MG: 0.4 TABLET, ORALLY DISINTEGRATING SUBLINGUAL at 09:27

## 2023-08-23 RX ADMIN — HEPARIN SODIUM 1000 UNITS/HR: 10000 INJECTION, SOLUTION INTRAVENOUS at 15:00

## 2023-08-23 RX ADMIN — ASPIRIN 81 MG 162 MG: 81 TABLET ORAL at 08:13

## 2023-08-23 RX ADMIN — HEPARIN SODIUM 5000 UNITS: 1000 INJECTION, SOLUTION INTRAVENOUS; SUBCUTANEOUS at 12:28

## 2023-08-23 RX ADMIN — Medication 2 PUFF: at 08:19

## 2023-08-23 RX ADMIN — METOPROLOL SUCCINATE 25 MG: 25 TABLET, EXTENDED RELEASE ORAL at 08:13

## 2023-08-23 RX ADMIN — TAMSULOSIN HYDROCHLORIDE 0.4 MG: 0.4 CAPSULE ORAL at 08:13

## 2023-08-23 RX ADMIN — Medication 2 PUFF: at 20:46

## 2023-08-23 RX ADMIN — SERTRALINE 100 MG: 50 TABLET, FILM COATED ORAL at 08:13

## 2023-08-23 RX ADMIN — SODIUM CHLORIDE: 9 INJECTION, SOLUTION INTRAVENOUS at 02:46

## 2023-08-23 RX ADMIN — SODIUM CHLORIDE: 9 INJECTION, SOLUTION INTRAVENOUS at 08:22

## 2023-08-23 RX ADMIN — SODIUM CHLORIDE: 9 INJECTION, SOLUTION INTRAVENOUS at 22:36

## 2023-08-23 RX ADMIN — ATORVASTATIN CALCIUM 80 MG: 80 TABLET, FILM COATED ORAL at 08:13

## 2023-08-23 RX ADMIN — MIDAZOLAM HYDROCHLORIDE 1 MG: 1 INJECTION INTRAMUSCULAR; INTRAVENOUS at 12:24

## 2023-08-23 RX ADMIN — OXYBUTYNIN CHLORIDE 5 MG: 5 TABLET ORAL at 20:44

## 2023-08-23 RX ADMIN — FENTANYL CITRATE 50 MCG: 50 INJECTION, SOLUTION INTRAMUSCULAR; INTRAVENOUS at 12:24

## 2023-08-23 RX ADMIN — NITROGLYCERIN 0.4 MG: 0.4 TABLET, ORALLY DISINTEGRATING SUBLINGUAL at 04:59

## 2023-08-23 RX ADMIN — OXYBUTYNIN CHLORIDE 5 MG: 5 TABLET ORAL at 08:13

## 2023-08-23 RX ADMIN — ASPIRIN 81 MG 81 MG: 81 TABLET ORAL at 08:21

## 2023-08-23 RX ADMIN — HEPARIN SODIUM 1000 UNITS/HR: 10000 INJECTION, SOLUTION INTRAVENOUS at 08:11

## 2023-08-23 RX ADMIN — NITROGLYCERIN 5 MCG/MIN: 20 INJECTION INTRAVENOUS at 10:40

## 2023-08-23 RX ADMIN — TIOTROPIUM BROMIDE INHALATION SPRAY 2 PUFF: 3.12 SPRAY, METERED RESPIRATORY (INHALATION) at 08:19

## 2023-08-23 ASSESSMENT — PAIN SCALES - GENERAL
PAINLEVEL_OUTOF10: 0
PAINLEVEL_OUTOF10: 5
PAINLEVEL_OUTOF10: 0

## 2023-08-23 ASSESSMENT — PAIN DESCRIPTION - LOCATION: LOCATION: CHEST

## 2023-08-23 NOTE — PROCEDURES
CARDIAC CATHETERIZATION REPORT     Procedure Date:  2023  Patient Name: Diaz Andujar  MRN: [id number] : 1950      INDICATION     Non-STEMI    PROCEDURES PERFORMED     Left heart catheterization  Coronary angiogam  Coronary cath  LIMA angiogram  Monitoring of moderate conscious sedation        PROCEDURE DESCRIPTION   Risks/benefits/alternatives/outcomes were discussed with patient and/or family and informed consent was obtained. Using the [facility] scale, the patient's right radial artery was found to be a level B. Patient was prepped draped in the usual sterile fashion. Local anaesthetic was applied over puncture site. Using a back wall technique, a 6 Irish Terumo sheath was inserted into right radial artery. Verapamil, nitroglycerin, Cardene were administered through the sheath. Heparin was administered. Diagnostic 5 Irish ultra catheters were used for diagnostic angiograms. 5 Irish pigtail was used for left heart catheterization, LV gram was deferred due to renal insufficiency. Limited angiograms were obtained due to renal insufficiency as well as left main disease. At the conclusion of the procedure, a TR band was placed over the puncture site and hemostasis was obtained. There were no immediate complications. I supervised sedation from 12:23 PM to 12:40 PM with versed 1 mg/fentanyl 50 mcg during the procedure. An independent trained observer pushed meds at my direction. We monitored the patient's level of consciousness and vital signs/physiologic status throughout the procedure duration (see times listed previously). 65 cc contrast was utilized. <20cc EBL.       FINDINGS         Left heart catheterization    LVEDP 8   GRADIENT ACROSS AORTIC VALVE None     Left subclavian and LIMA are widely patent    CORONARY ARTERIES    LM Heavy calcification is noted, there is ventricularization of pressures with 5 [place] diagnostic catheter, short vessel, appears to be 40% diffuse stenosis in the lrktkymm-atb-papoen segment. LAD Heavily calcified, there is ostial/proximal eccentric 50 to 60% stenosis. There is also a proximal-mid eccentric 60 to 70% stenosis and mid-distal 80% stenosis. Vessel is heavily calcified throughout. D1 has 60 to 70% proximal-mid stenosis. There are well-developed left-to-right collaterals. LCX Calcified, proximal 40 to 50% stenosis, there is mid stenosis that extends into the ostium and proximal segment of a large OM 3 which is the largest OM and that has a 90% stenosis. RCA Dominant, calcified, there are proximal-mid stents, there is severe in-stent restenosis with 90% stenosis in the proximal-mid segment followed by 95 and then 99 to 100%  in the distal segment. There is faint antegrade filling and right to right collaterals and there are better developed left-to-right collaterals noted to PDA and PLV territory. CONCLUSIONS:     MV cad/ashd  Refer to CT surgery consideration of CABG  If not felt to be a candidate for CABG, or per pt preference, can consider high risk MV PCI    Restart heparin and nitro drips, heparin will be started in 2 hours and nitro will be started now. Addendum: d/w ct surgery svc, plan is for surgery, pt/family agree, as such, our svc will sign off, we are available for any urgent needs/questions, please call us if needed.

## 2023-08-23 NOTE — PROGRESS NOTES
V2.0    [facility] Progress Note      Name:  Gómez Castelan /Age/Sex: 1950  (74 y.o. male)   MRN & CSN:  [id number] & [id number] Encounter Date/Time: 2023 2:15 PM EDT   Location:  [address] [place], [place] [address] PCP: CHRIS Cabrera MD       Hospital Day: 2    Assessment and Recommendations   Gómez Castelan is a 74 y.o. male with pmh of hypertension, hyperlipidemia, CAD (2 stents), PAD (leg stent), COPD, hyperthyroidism, history of bladder cancer and depression who presents with intermittent left-sided chest pain. Troponins were elevated. 1.  Non-ST elevation MI: Cardiology consulted. Echo-EF 40% with hypokinesis of the basal inferior wall. Cardiac catheterization-multiple vessel disease. Cardiothoracic surgery consulted for possible CABG. We will continue on heparin drip and nitroglycerin drip. Continue Toprol, aspirin and Lipitor. 2.  Acute on chronic kidney disease: Baseline creatinine 1.3-1.5. Nephrology consult appreciated. Patient is improving with IV fluid hydration. Avoid nephrotoxic medication. 3.  COPD: Not in exacerbation. Continue bronchodilators. 4.  Hyperthyroidism: Continue methimazole. Chronic problems include hypertension, hyperlipidemia, history of bladder cancer (TURBT and BCG) and depression. Diet ADULT DIET; Regular   DVT Prophylaxis [] Lovenox, [x]  Heparin, [] SCDs, [] Ambulation,  [] Eliquis, [] Xarelto  [] Coumadin   Code Status Full Code   Disposition From: He lives with his wife. Expected Disposition: To be determined. Estimated Date of Discharge: 1 to 3 days  Patient requires continued admission due to non-ST elevation MI. Surrogate Decision Maker/ POA       Personally reviewed Lab Studies and Imaging         Subjective:     Chief Complaint: Wife at bedside. Patient had 2 episodes of chest pain earlier this morning relieved by nitroglycerin.       Review of Systems:      Pertinent positives and negatives discussed in HPI    Objective: Q5 Min PRN  ipratropium 0.5 mg-albuterol 2.5 mg, 1 Dose, 4x Daily PRN        Labs and Imaging   XR CHEST PORTABLE    Result Date: 8/22/2023  EXAMINATION: ONE XRAY VIEW OF THE CHEST 8/22/2023 3:02 am COMPARISON: None. HISTORY: ORDERING SYSTEM PROVIDED HISTORY: TECHNOLOGIST PROVIDED HISTORY: Reason for Exam: cp FINDINGS: The lungs and pleural spaces are without acute focal process. The cardiomediastinal silhouette is without acute process. There is no evidence of pneumothorax. The osseous structures are without acute process. No acute process. PET CT SKULL BASE TO MID THIGH    Result Date: 8/20/2023  EXAMINATION: WHOLE BODY PET/CT 8/18/2023 TECHNIQUE: Following IV injection of 17.33 mCi of F-18 FDG, PET  tumor imaging was acquired from the base of the skull to the mid thighs. Computed tomography was used for purposes of attenuation correction and anatomic localization. Fusion imaging was utilized for interpretation. Uptake time 50 min. Glucose level 102 mg/dl. COMPARISON: 06/19/2015 and 07/24/2023 CT studies HISTORY: ORDERING SYSTEM PROVIDED HISTORY: Lung nodule FINDINGS: HEAD/NECK: No abnormal activity in the visualized head and neck. CHEST: The spiculated right upper lobe pulmonary nodule described on the prior CT is significantly decreased in conspicuity on the current exam.  This is difficult to characterize as a discrete nodule, appearing more like ill-defined airspace change. Best approximate measurements 7 x 5 mm. No associated activity with SUV max 0.9, near background. No new pulmonary nodule. There is a moderate degree of centrilobular emphysema. ABDOMEN/PELVIS: There is normal physiologic excretion by the kidneys into the bladder. Complex renal cyst for previously characterized as hemorrhagic or proteinaceous. No activity, although characterization cannot be provided on this noncontrast exam. BONES/SOFT TISSUE: No abnormal activity in the axial or appendicular skeleton.  INCIDENTAL CT

## 2023-08-23 NOTE — CARE COORDINATION
Chart reviewed day 1. Pt is followed by IM, Cards and Nephro. Pt w/increased frequency of chest pain this AM. Planned HC today. Pt IPTA in mobile home w/spouse. Pt w/no HHC pref. Will follow for needs as they arise.  Electronically signed by Natan Holman RN on 8/23/2023 at 10:25 AM

## 2023-08-23 NOTE — CONSULTS
Department of Cardiovascular & Thoracic Surgery  History and Physical          DIAGNOSIS: NSTEMI in setting of Multivessel CAD    CHIEF COMPLAINT:    Chief Complaint   Patient presents with    Chest Pain     Pt is coming in with a chief complaint of chest pain that started earlier today and an hr ago. Pt wife states that he has had it for a couple of days; pt states pain is a 7/10 at worst; Pt has a history of a MI back in 2010. History Obtained From:  patient, electronic medical record    HISTORY OF PRESENT ILLNESS:      The patient is a 74 y.o. male with significant past medical history of CAD w/ stents, HTN, HLD, COPD, CKD, hyperthyroidism, bladder CA who presents with c/o CP. He describes the pain as non-exertional that is a burning sensation in the L chest.  He endorses mild SOB. He has similar s/s in 2010, in which he underwent PCI of RCA. He reports following with cardiology at [facility] and was last seen in July 2023. In the ED his troponin levels were 19-31-38. His EKG in ED showed acute anterior lateral ST depression. Additionally, he was noted to have an acute on chronic GAYATHRI w/ Cr 2.2 (baseline Cr 1.3-1.5). Nephrology was consulted for this. He was started on Heparin and NTG gtt and Cardiology was consulted and he underwent LHC 8/23 that revealed multivessel CAD to which we are asked to see in consultation for possible myocardial revascularization.      Past Medical History:    Past Medical History:   Diagnosis Date    CAD (coronary artery disease)     Cancer ([address])     BLADDER    COPD (chronic obstructive pulmonary disease) ([address])     Depression     ADJUSTMENT DISORDER    Heart attack ([address]) 10/10    History of renal calculi     Hyperlipidemia     Hypertension     Hyperthyroidism     RBBB        Past Surgical History:    Past Surgical History:   Procedure Laterality Date    CARDIAC SURGERY  2010    2 stents    COLONOSCOPY  5/2/14    colonoscopy and egd    CYSTOSCOPY      CYSTOSCOPY Left PLAN:    Labs, imaging and notes from IM, cardiology and nephrology reviewed    Care per primary    [id number] Score for Atrial Fibrillation Stroke Risk   Risk   Factors  Component Value   C CHF No 0   H HTN Yes 1   A2 Age >= 76 No,  (69 y.o.) 0   D DM Yes 1   S2 Prior Stroke/TIA No 0   V Vascular Disease Yes 1   A Age 77-78 Yes,  (69 y.o.) 1   Sc Sex male 0    [id number]  Score  4   Score last updated 8/23/23 54:39 PM EDT    Click here for a link to the UpToDate guideline \"Atrial Fibrillation: Anticoagulation therapy to prevent embolization    Disclaimer: Risk Score calculation is dependent on accuracy of patient problem list and past encounter diagnosis. STS Cardiac Surgery Risk profile:    Procedure Type: Isolated CABG  Perioperative Outcome Estimate %  Operative Mortality 5.75%  Morbidity & Mortality 20.1%  Stroke 1.27%  Renal Failure 12.7%  Reoperation 5.04%  Prolonged Ventilation 9.17%  Deep Sternal Wound Infection 0.146%  [address] [facility] Stay (>14 days) 14.5%  [facility] Stay (<6 days)* 20.1%      Mr. Demetra Ramirez is a 74 y.o. male w/ pmHx HTN, HLD, CAD w/ PCI RCA, hyperthyroid, COPD, CKD who presented w/ c/o CP. In the ED his EKG showed anterior lateral ST depression and his troponin trend was elevated. He underwent LHC on 08/23 that revealed multivessel CAD. Labs and imaging to be further reviewed w/ Dr. Diamante Guerrero. Pt will likely need CABG 3-4 w/ IGLESIA clip,pending further medical optimization and renal recovery. Surgical timing TBD. Discussed w/ patient and family. Understanding verbalized. Carotid duplex scan. Vein mapping. PFTs.     Matt Moscoso, NEWTON - CNP  8/23/2023  12:37 PM

## 2023-08-23 NOTE — PLAN OF CARE
Problem: Discharge Planning  Goal: Discharge to home or other facility with appropriate resources  Outcome: Progressing     Problem: Pain  Goal: Verbalizes/displays adequate comfort level or baseline comfort level  8/22/2023 2215 by Gary Loredo RN  Outcome: Progressing  8/22/2023 1950 by Edi Garcia RN  Outcome: Progressing

## 2023-08-23 NOTE — PROGRESS NOTES
Patient with complaints of chest pain x2 this morning. Sublingual nitro given both times with patient stating relief. EKG obtained after second compliant of chest pain. Cardiology notified of increasing frequency of pain and that EKG is in the chart for review.

## 2023-08-24 ENCOUNTER — APPOINTMENT (OUTPATIENT)
Dept: VASCULAR LAB | Age: 73
End: 2023-08-24
Payer: MEDICARE

## 2023-08-24 LAB
ANION GAP SERPL CALCULATED.3IONS-SCNC: 7 MMOL/L (ref 3–16)
ANTI-XA UNFRAC HEPARIN: 0.4 IU/ML (ref 0.3–0.7)
BASOPHILS # BLD: 0 K/UL (ref 0–0.2)
BASOPHILS NFR BLD: 0.8 %
BUN SERPL-MCNC: 19 MG/DL (ref 7–20)
CALCIUM SERPL-MCNC: 8.4 MG/DL (ref 8.3–10.6)
CHLORIDE SERPL-SCNC: 112 MMOL/L (ref 99–110)
CO2 SERPL-SCNC: 20 MMOL/L (ref 21–32)
CREAT SERPL-MCNC: 1.2 MG/DL (ref 0.8–1.3)
DEPRECATED RDW RBC AUTO: 17.9 % (ref 12.4–15.4)
EKG ATRIAL RATE: 74 BPM
EKG ATRIAL RATE: 76 BPM
EKG DIAGNOSIS: NORMAL
EKG DIAGNOSIS: NORMAL
EKG P AXIS: 52 DEGREES
EKG P AXIS: 84 DEGREES
EKG P-R INTERVAL: 128 MS
EKG P-R INTERVAL: 134 MS
EKG Q-T INTERVAL: 386 MS
EKG Q-T INTERVAL: 404 MS
EKG QRS DURATION: 118 MS
EKG QRS DURATION: 124 MS
EKG QTC CALCULATION (BAZETT): 434 MS
EKG QTC CALCULATION (BAZETT): 448 MS
EKG R AXIS: 126 DEGREES
EKG R AXIS: 32 DEGREES
EKG T AXIS: -2 DEGREES
EKG T AXIS: 4 DEGREES
EKG VENTRICULAR RATE: 74 BPM
EKG VENTRICULAR RATE: 76 BPM
EOSINOPHIL # BLD: 0.1 K/UL (ref 0–0.6)
EOSINOPHIL NFR BLD: 1.1 %
GFR SERPLBLD CREATININE-BSD FMLA CKD-EPI: >60 ML/MIN/{1.73_M2}
GLUCOSE SERPL-MCNC: 99 MG/DL (ref 70–99)
HCT VFR BLD AUTO: 36.1 % (ref 40.5–52.5)
HGB BLD-MCNC: 11.9 G/DL (ref 13.5–17.5)
LYMPHOCYTES # BLD: 1.1 K/UL (ref 1–5.1)
LYMPHOCYTES NFR BLD: 22.8 %
MAGNESIUM SERPL-MCNC: 2 MG/DL (ref 1.8–2.4)
MCH RBC QN AUTO: 26.5 PG (ref 26–34)
MCHC RBC AUTO-ENTMCNC: 32.9 G/DL (ref 31–36)
MCV RBC AUTO: 80.6 FL (ref 80–100)
MONOCYTES # BLD: 1.1 K/UL (ref 0–1.3)
MONOCYTES NFR BLD: 23.8 %
NEUTROPHILS # BLD: 2.4 K/UL (ref 1.7–7.7)
NEUTROPHILS NFR BLD: 51.5 %
PLATELET # BLD AUTO: 166 K/UL (ref 135–450)
PMV BLD AUTO: 8.2 FL (ref 5–10.5)
POTASSIUM SERPL-SCNC: 4.6 MMOL/L (ref 3.5–5.1)
RBC # BLD AUTO: 4.47 M/UL (ref 4.2–5.9)
SODIUM SERPL-SCNC: 139 MMOL/L (ref 136–145)
WBC # BLD AUTO: 4.7 K/UL (ref 4–11)

## 2023-08-24 PROCEDURE — 36415 COLL VENOUS BLD VENIPUNCTURE: CPT

## 2023-08-24 PROCEDURE — 6370000000 HC RX 637 (ALT 250 FOR IP): Performed by: NURSE PRACTITIONER

## 2023-08-24 PROCEDURE — 6360000002 HC RX W HCPCS: Performed by: INTERNAL MEDICINE

## 2023-08-24 PROCEDURE — 2580000003 HC RX 258: Performed by: INTERNAL MEDICINE

## 2023-08-24 PROCEDURE — 93010 ELECTROCARDIOGRAM REPORT: CPT | Performed by: INTERNAL MEDICINE

## 2023-08-24 PROCEDURE — 6370000000 HC RX 637 (ALT 250 FOR IP): Performed by: INTERNAL MEDICINE

## 2023-08-24 PROCEDURE — 94640 AIRWAY INHALATION TREATMENT: CPT

## 2023-08-24 PROCEDURE — 85520 HEPARIN ASSAY: CPT

## 2023-08-24 PROCEDURE — 83735 ASSAY OF MAGNESIUM: CPT

## 2023-08-24 PROCEDURE — 93971 EXTREMITY STUDY: CPT

## 2023-08-24 PROCEDURE — 6360000002 HC RX W HCPCS: Performed by: NURSE PRACTITIONER

## 2023-08-24 PROCEDURE — 2580000003 HC RX 258: Performed by: NURSE PRACTITIONER

## 2023-08-24 PROCEDURE — 93880 EXTRACRANIAL BILAT STUDY: CPT

## 2023-08-24 PROCEDURE — 85025 COMPLETE CBC W/AUTO DIFF WBC: CPT

## 2023-08-24 PROCEDURE — 80048 BASIC METABOLIC PNL TOTAL CA: CPT

## 2023-08-24 PROCEDURE — 99232 SBSQ HOSP IP/OBS MODERATE 35: CPT

## 2023-08-24 PROCEDURE — 2060000000 HC ICU INTERMEDIATE R&B

## 2023-08-24 RX ADMIN — Medication 2 PUFF: at 08:17

## 2023-08-24 RX ADMIN — Medication 2 PUFF: at 19:37

## 2023-08-24 RX ADMIN — HEPARIN SODIUM 1000 UNITS/HR: 10000 INJECTION, SOLUTION INTRAVENOUS at 10:12

## 2023-08-24 RX ADMIN — OXYBUTYNIN CHLORIDE 5 MG: 5 TABLET ORAL at 08:59

## 2023-08-24 RX ADMIN — NITROGLYCERIN 5 MCG/MIN: 20 INJECTION INTRAVENOUS at 03:09

## 2023-08-24 RX ADMIN — TIOTROPIUM BROMIDE INHALATION SPRAY 2 PUFF: 3.12 SPRAY, METERED RESPIRATORY (INHALATION) at 08:17

## 2023-08-24 RX ADMIN — SODIUM CHLORIDE, PRESERVATIVE FREE 10 ML: 5 INJECTION INTRAVENOUS at 22:48

## 2023-08-24 RX ADMIN — ASPIRIN 81 MG 81 MG: 81 TABLET ORAL at 08:59

## 2023-08-24 RX ADMIN — Medication 10 ML: at 09:03

## 2023-08-24 RX ADMIN — Medication 10 ML: at 22:49

## 2023-08-24 RX ADMIN — METOPROLOL SUCCINATE 25 MG: 25 TABLET, EXTENDED RELEASE ORAL at 08:59

## 2023-08-24 RX ADMIN — SERTRALINE 100 MG: 50 TABLET, FILM COATED ORAL at 08:59

## 2023-08-24 RX ADMIN — TAMSULOSIN HYDROCHLORIDE 0.4 MG: 0.4 CAPSULE ORAL at 08:59

## 2023-08-24 RX ADMIN — METHIMAZOLE 5 MG: 5 TABLET ORAL at 09:03

## 2023-08-24 RX ADMIN — SODIUM CHLORIDE, PRESERVATIVE FREE 10 ML: 5 INJECTION INTRAVENOUS at 09:02

## 2023-08-24 RX ADMIN — OXYBUTYNIN CHLORIDE 5 MG: 5 TABLET ORAL at 22:47

## 2023-08-24 RX ADMIN — ATORVASTATIN CALCIUM 80 MG: 80 TABLET, FILM COATED ORAL at 08:59

## 2023-08-24 ASSESSMENT — PAIN SCALES - GENERAL
PAINLEVEL_OUTOF10: 0
PAINLEVEL_OUTOF10: 4

## 2023-08-24 ASSESSMENT — PAIN DESCRIPTION - LOCATION: LOCATION: CHEST

## 2023-08-24 NOTE — CARE COORDINATION
Chart reviewed day 2. Pt is followed by IM, Cards and Nephro. Pt w/chest pain this am requiring Nitro gtt to be increased. Pt is POD 1 LHC. Pt is IPTA w/spouse and still drives. Will follow for needs as they arise.  Electronically signed by Harriett Chung RN on 8/24/2023 at 10:22 AM

## 2023-08-24 NOTE — PROGRESS NOTES
V2.0    [facility] [place]      Name:  Serena Fisher /Age/Sex: 1950  (74 y.o. male)   MRN & CSN:  [id number] & [id number] Encounter Date/Time: 2023 2:15 PM EDT   Location:  [address] [place] [address] PCP: CHRIS Azar MD       Hospital Day: 3    Assessment and Recommendations   Serena Fisher is a 74 y.o. male with pmh of hypertension, hyperlipidemia, CAD (2 stents), PAD (leg stent), COPD, hyperthyroidism, history of bladder cancer and depression who presents with intermittent left-sided chest pain. Troponins were elevated. 1.  Non-ST elevation MI: Cardiology consulted. Echo-EF 40% with hypokinesis of the basal inferior wall. Cardiac catheterization-multiple vessel disease. CTS consult appreciated-for possible CABG and IGLESIA clip. Currently on heparin drip and nitroglycerin drip. Continue Toprol, aspirin and Lipitor. 2.  Acute on chronic kidney disease: Baseline creatinine 1.3-1.5. Nephrology consult appreciated. Patient is improving with IV fluid hydration. Avoid nephrotoxic medication. 3.  COPD: Not in exacerbation. Continue bronchodilators. 4.  Hyperthyroidism: Continue methimazole. Chronic problems include hypertension, hyperlipidemia, history of bladder cancer (TURBT and BCG) and depression. Diet ADULT DIET; Regular   DVT Prophylaxis [] Lovenox, [x]  Heparin, [] SCDs, [] Ambulation,  [] Eliquis, [] Xarelto  [] Coumadin   Code Status Full Code   Disposition From: He lives with his wife. Expected Disposition: To be determined. Estimated Date of Discharge: 1 to 3 days  Patient requires continued admission due to non-ST elevation MI. Surrogate Decision Maker/ POA       Personally reviewed Lab Studies and Imaging         Subjective:     Chief Complaint:     No chest pain or shortness of breath this morning. Review of Systems:      Pertinent positives and negatives discussed in HPI    Objective:      Intake/Output Summary (Last 24 hours) at Incidental hepatic cysts are also stable. 1. Decreased size and conspicuity of previously described spiculated nodule in the right upper lobe. There is also no significant activity. This is suspected to have been an area of either atelectasis or mild inflammation/infection on the prior study. Recommend CT follow-up in 6-12 months to ensure stability/resolution. 2. Centrilobular emphysema is otherwise noted. 3. Limited characterization of complex renal cysts with recommendations previously provided.   No activity on current exam.       CBC:   Recent Labs     08/22/23  0249 08/23/23  0447 08/24/23  0505   WBC 5.9 4.8 4.7   HGB 13.8 12.3* 11.9*    182 166       BMP:    Recent Labs     08/23/23  0447 08/23/23  1401 08/24/23  0505    135* 139   K 5.6* 4.6 4.6    108 112*   CO2 23 18* 20*   BUN 32* 26* 19   CREATININE 1.5* 1.2 1.2   GLUCOSE 92 86 99       Hepatic:   Recent Labs     08/22/23  0249   AST 17   ALT 20   BILITOT 0.3   ALKPHOS 87       Lipids:   Lab Results   Component Value Date/Time    CHOL 144 08/22/2023 06:19 AM    HDL 31 08/22/2023 06:19 AM    HDL 42 02/09/2011 01:28 PM    TRIG 192 08/22/2023 06:19 AM     Hemoglobin A1C:   Lab Results   Component Value Date/Time    LABA1C 6.3 08/22/2023 06:19 AM     TSH:   Lab Results   Component Value Date/Time    TSH 2.22 08/22/2023 06:19 AM     BNP:   Recent Labs     08/22/23  0619   PROBNP 123       UA:  Lab Results   Component Value Date/Time    NITRU POSITIVE 10/18/2021 01:31 PM    COLORU Yellow 04/03/2023 11:35 AM    COLORU DARK YELLOW 10/18/2021 01:31 PM    PHUR 6.0 04/03/2023 11:35 AM    PHUR 6.0 10/18/2021 01:31 PM    WBCUA 3-5 10/18/2021 01:31 PM    RBCUA >100 10/18/2021 01:31 PM    BACTERIA Rare 10/18/2021 01:31 PM    CLARITYU Cloudy 04/03/2023 11:35 AM    CLARITYU SL CLOUDY 10/18/2021 01:31 PM    SPECGRAV >=1.030 04/03/2023 11:35 AM    SPECGRAV 1.025 10/18/2021 01:31 PM    LEUKOCYTESUR Large 04/03/2023 11:35 AM    LEUKOCYTESUR SMALL

## 2023-08-24 NOTE — PLAN OF CARE
Problem: Discharge Planning  Goal: Discharge to home or other facility with appropriate resources  8/23/2023 2201 by Roxana Hubbard RN  Outcome: Progressing  8/23/2023 1751 by Anjali Gil RN  Outcome: Progressing     Problem: Pain  Goal: Verbalizes/displays adequate comfort level or baseline comfort level  8/23/2023 2201 by Roxana Hubbard RN  Outcome: Progressing  8/23/2023 1751 by Anjali Gil RN  Outcome: Progressing

## 2023-08-24 NOTE — PROGRESS NOTES
Pt reports chest pain 7/10 with minimal activity. Nitroglycerin gtt increased to 6.667 mcg/min. Will monitor.

## 2023-08-25 LAB
ANTI-XA UNFRAC HEPARIN: 0.25 IU/ML (ref 0.3–0.7)
ANTI-XA UNFRAC HEPARIN: 0.56 IU/ML (ref 0.3–0.7)
ANTI-XA UNFRAC HEPARIN: 0.58 IU/ML (ref 0.3–0.7)
BASOPHILS # BLD: 0.1 K/UL (ref 0–0.2)
BASOPHILS NFR BLD: 1.2 %
DEPRECATED RDW RBC AUTO: 17.7 % (ref 12.4–15.4)
EOSINOPHIL # BLD: 0.1 K/UL (ref 0–0.6)
EOSINOPHIL NFR BLD: 1.1 %
HCT VFR BLD AUTO: 36.6 % (ref 40.5–52.5)
HGB BLD-MCNC: 12 G/DL (ref 13.5–17.5)
LYMPHOCYTES # BLD: 1.3 K/UL (ref 1–5.1)
LYMPHOCYTES NFR BLD: 22.9 %
MAGNESIUM SERPL-MCNC: 1.9 MG/DL (ref 1.8–2.4)
MCH RBC QN AUTO: 26.7 PG (ref 26–34)
MCHC RBC AUTO-ENTMCNC: 32.9 G/DL (ref 31–36)
MCV RBC AUTO: 81.1 FL (ref 80–100)
MONOCYTES # BLD: 1.4 K/UL (ref 0–1.3)
MONOCYTES NFR BLD: 26.1 %
NEUTROPHILS # BLD: 2.7 K/UL (ref 1.7–7.7)
NEUTROPHILS NFR BLD: 48.7 %
PLATELET # BLD AUTO: 166 K/UL (ref 135–450)
PMV BLD AUTO: 8.1 FL (ref 5–10.5)
POC ACT LR: 394 SEC
RBC # BLD AUTO: 4.51 M/UL (ref 4.2–5.9)
WBC # BLD AUTO: 5.5 K/UL (ref 4–11)

## 2023-08-25 PROCEDURE — 6360000002 HC RX W HCPCS: Performed by: INTERNAL MEDICINE

## 2023-08-25 PROCEDURE — 6370000000 HC RX 637 (ALT 250 FOR IP): Performed by: INTERNAL MEDICINE

## 2023-08-25 PROCEDURE — 85025 COMPLETE CBC W/AUTO DIFF WBC: CPT

## 2023-08-25 PROCEDURE — 6370000000 HC RX 637 (ALT 250 FOR IP): Performed by: NURSE PRACTITIONER

## 2023-08-25 PROCEDURE — 94640 AIRWAY INHALATION TREATMENT: CPT

## 2023-08-25 PROCEDURE — 2060000000 HC ICU INTERMEDIATE R&B

## 2023-08-25 PROCEDURE — 36415 COLL VENOUS BLD VENIPUNCTURE: CPT

## 2023-08-25 PROCEDURE — 85520 HEPARIN ASSAY: CPT

## 2023-08-25 PROCEDURE — 83735 ASSAY OF MAGNESIUM: CPT

## 2023-08-25 RX ORDER — HEPARIN SODIUM 1000 [USP'U]/ML
2000 INJECTION, SOLUTION INTRAVENOUS; SUBCUTANEOUS ONCE
Status: COMPLETED | OUTPATIENT
Start: 2023-08-25 | End: 2023-08-25

## 2023-08-25 RX ADMIN — OXYBUTYNIN CHLORIDE 5 MG: 5 TABLET ORAL at 08:18

## 2023-08-25 RX ADMIN — HEPARIN SODIUM 1170 UNITS/HR: 10000 INJECTION, SOLUTION INTRAVENOUS at 10:38

## 2023-08-25 RX ADMIN — Medication 2 PUFF: at 20:32

## 2023-08-25 RX ADMIN — METOPROLOL SUCCINATE 25 MG: 25 TABLET, EXTENDED RELEASE ORAL at 08:18

## 2023-08-25 RX ADMIN — OXYBUTYNIN CHLORIDE 5 MG: 5 TABLET ORAL at 21:54

## 2023-08-25 RX ADMIN — ASPIRIN 81 MG 81 MG: 81 TABLET ORAL at 08:18

## 2023-08-25 RX ADMIN — TAMSULOSIN HYDROCHLORIDE 0.4 MG: 0.4 CAPSULE ORAL at 08:18

## 2023-08-25 RX ADMIN — HEPARIN SODIUM 2000 UNITS: 1000 INJECTION INTRAVENOUS; SUBCUTANEOUS at 06:39

## 2023-08-25 RX ADMIN — METHIMAZOLE 5 MG: 5 TABLET ORAL at 08:20

## 2023-08-25 RX ADMIN — SERTRALINE 100 MG: 50 TABLET, FILM COATED ORAL at 08:18

## 2023-08-25 RX ADMIN — ATORVASTATIN CALCIUM 80 MG: 80 TABLET, FILM COATED ORAL at 08:18

## 2023-08-25 NOTE — PROGRESS NOTES
V2.0    [facility] Progress Note      Name:  Earlene Palomares /Age/Sex: 1950  (74 y.o. male)   MRN & CSN:  [id number] & [id number] Encounter Date/Time: 2023 2:15 PM EDT   [facility]  [facility] [place] PCP: CHRIS Menchaca MD       Hospital Day: 4    Assessment and Recommendations   Earlene Palomares is a 74 y.o. male with pmh of hypertension, hyperlipidemia, CAD (2 stents), PAD (leg stent), COPD, hyperthyroidism, history of bladder cancer and depression who presents with intermittent left-sided chest pain. Troponins were elevated. 1.  Non-ST elevation MI: Cardiology consulted. Echo-EF 40% with hypokinesis of the basal inferior wall. Cardiac catheterization-multiple vessel disease. CTS consult appreciated-for possible CABG and IGLESIA clip. Currently on heparin drip and nitroglycerin drip. Continue Toprol, aspirin and Lipitor. 2.  Acute on chronic kidney disease: Baseline creatinine 1.3-1.5. Nephrology consult appreciated. Patient is improving with IV fluid hydration. Avoid nephrotoxic medication. 3.  COPD: Not in exacerbation. Continue bronchodilators. 4.  Hyperthyroidism: Continue methimazole. Chronic problems include hypertension, hyperlipidemia, history of bladder cancer (TURBT and BCG) and depression. Diet ADULT DIET; Regular   DVT Prophylaxis [] Lovenox, [x]  Heparin, [] SCDs, [] Ambulation,  [] Eliquis, [] Xarelto  [] Coumadin   Code Status Full Code   Disposition From: He lives with his wife. Expected Disposition: To be determined. Estimated Date of Discharge: 1 to 3 days  Patient requires continued admission due to non-ST elevation MI. Surrogate Decision Maker/ POA       Personally reviewed Lab Studies and Imaging         Subjective:     Chief Complaint: He was sleeping when I came in. Denies any complaint today. Review of Systems:      Pertinent positives and negatives discussed in HPI    Objective:      Intake/Output Summary (Last 24 hours) at

## 2023-08-25 NOTE — PLAN OF CARE
Problem: Discharge Planning  Goal: Discharge to home or other facility with appropriate resources  8/25/2023 0918 by Brooke Prather RN  Outcome: Progressing  8/25/2023 0022 by Nina Kim RN  Outcome: Progressing     Problem: Pain  Goal: Verbalizes/displays adequate comfort level or baseline comfort level  8/25/2023 0918 by Brooke Prather RN  Outcome: Progressing  8/25/2023 0022 by Nina Kim RN  Outcome: Progressing     Problem: Safety - Adult  Goal: Free from fall injury  8/25/2023 0918 by Brooke Prather RN  Outcome: Progressing  8/25/2023 0022 by Nina Kim RN  Outcome: Progressing

## 2023-08-25 NOTE — CARE COORDINATION
Chart reviewed day 3. Pt is followed by IM,Cards and Nephro. Pt POD 2 LHC. Pt requiring heparin and nitro drip. Possible CABG vs PCI, recs pending CT Sx and Cards. Pt IPTA w/spouse and still drives. Will follow for needs as they arise.  Electronically signed by Ashly Flor RN on 8/25/2023 at 10:42 AM

## 2023-08-26 LAB — ANTI-XA UNFRAC HEPARIN: 0.41 IU/ML (ref 0.3–0.7)

## 2023-08-26 PROCEDURE — 86900 BLOOD TYPING SEROLOGIC ABO: CPT

## 2023-08-26 PROCEDURE — 36415 COLL VENOUS BLD VENIPUNCTURE: CPT

## 2023-08-26 PROCEDURE — 2060000000 HC ICU INTERMEDIATE R&B

## 2023-08-26 PROCEDURE — 6370000000 HC RX 637 (ALT 250 FOR IP): Performed by: INTERNAL MEDICINE

## 2023-08-26 PROCEDURE — 6360000002 HC RX W HCPCS: Performed by: INTERNAL MEDICINE

## 2023-08-26 PROCEDURE — 86901 BLOOD TYPING SEROLOGIC RH(D): CPT

## 2023-08-26 PROCEDURE — 86923 COMPATIBILITY TEST ELECTRIC: CPT

## 2023-08-26 PROCEDURE — 85520 HEPARIN ASSAY: CPT

## 2023-08-26 PROCEDURE — 6370000000 HC RX 637 (ALT 250 FOR IP): Performed by: NURSE PRACTITIONER

## 2023-08-26 PROCEDURE — 86850 RBC ANTIBODY SCREEN: CPT

## 2023-08-26 PROCEDURE — 94640 AIRWAY INHALATION TREATMENT: CPT

## 2023-08-26 PROCEDURE — 2580000003 HC RX 258: Performed by: INTERNAL MEDICINE

## 2023-08-26 RX ADMIN — SERTRALINE 100 MG: 50 TABLET, FILM COATED ORAL at 08:26

## 2023-08-26 RX ADMIN — ATORVASTATIN CALCIUM 80 MG: 80 TABLET, FILM COATED ORAL at 08:26

## 2023-08-26 RX ADMIN — HEPARIN SODIUM 1170 UNITS/HR: 10000 INJECTION, SOLUTION INTRAVENOUS at 08:54

## 2023-08-26 RX ADMIN — ASPIRIN 81 MG 81 MG: 81 TABLET ORAL at 08:26

## 2023-08-26 RX ADMIN — SODIUM CHLORIDE, PRESERVATIVE FREE 10 ML: 5 INJECTION INTRAVENOUS at 20:51

## 2023-08-26 RX ADMIN — OXYBUTYNIN CHLORIDE 5 MG: 5 TABLET ORAL at 08:26

## 2023-08-26 RX ADMIN — Medication 2 PUFF: at 20:49

## 2023-08-26 RX ADMIN — TAMSULOSIN HYDROCHLORIDE 0.4 MG: 0.4 CAPSULE ORAL at 08:26

## 2023-08-26 RX ADMIN — METHIMAZOLE 5 MG: 5 TABLET ORAL at 08:26

## 2023-08-26 RX ADMIN — METOPROLOL SUCCINATE 25 MG: 25 TABLET, EXTENDED RELEASE ORAL at 08:26

## 2023-08-26 RX ADMIN — OXYBUTYNIN CHLORIDE 5 MG: 5 TABLET ORAL at 20:50

## 2023-08-26 NOTE — PLAN OF CARE
Problem: Discharge Planning  Goal: Discharge to home or other facility with appropriate resources    Outcome: Progressing     Problem: Pain  Goal: Verbalizes/displays adequate comfort level or baseline comfort level    Outcome: Progressing     Problem: Safety - Adult  Goal: Free from fall injury    Outcome: Progressing

## 2023-08-26 NOTE — PROGRESS NOTES
V2.0    [facility] [place] Progress Note      Name:  Ludy White /Age/Sex: 1950  (74 y.o. male)   MRN & CSN:  [id number] & [id number] Encounter Date/Time: 2023 2:15 PM EDT   Location:  [address] [place] [address] PCP: CHRIS Vaz MD       Hospital Day: 5    Assessment and Recommendations   Ludy White is a 74 y.o. male with pmh of hypertension, hyperlipidemia, CAD (2 stents), PAD (leg stent), COPD, hyperthyroidism, history of bladder cancer and depression who presents with intermittent left-sided chest pain. Troponins were elevated. 1.  Non-ST elevation MI: Cardiology consulted. Echo-EF 40% with hypokinesis of the basal inferior wall. Cardiac catheterization-multiple vessel disease. CTS consult appreciated-for possible CABG and IGLESIA clip on . Currently on heparin drip and nitroglycerin drip. Continue Toprol, aspirin and Lipitor. 2.  Acute on chronic kidney disease: Baseline creatinine 1.3-1.5. Nephrology consult appreciated. Patient improved with IV fluid hydration. Avoid nephrotoxic medication. 3.  COPD: Not in exacerbation. Continue bronchodilators. 4.  Hyperthyroidism: Continue methimazole. Chronic problems include hypertension, hyperlipidemia, history of bladder cancer (TURBT and BCG) and depression. Diet ADULT DIET; Regular   DVT Prophylaxis [] Lovenox, [x]  Heparin, [] SCDs, [] Ambulation,  [] Eliquis, [] Xarelto  [] Coumadin   Code Status Full Code   Disposition From: He lives with his wife. Expected Disposition: To be determined. Estimated Date of Discharge: 1 to 3 days  Patient requires continued admission due to non-ST elevation MI. Surrogate Decision Maker/ POA       Personally reviewed Lab Studies and Imaging         Subjective:     Chief Complaint:     No complaint this morning. Review of Systems:      Pertinent positives and negatives discussed in HPI    Objective:      Intake/Output Summary (Last 24 hours) at 2023 1403  Last data also no significant activity. This is suspected to have been an area of either atelectasis or mild inflammation/infection on the prior study. Recommend CT follow-up in 6-12 months to ensure stability/resolution. 2. Centrilobular emphysema is otherwise noted. 3. Limited characterization of complex renal cysts with recommendations previously provided. No activity on current exam.       CBC:   Recent Labs     08/24/23  0505 08/25/23  0456   WBC 4.7 5.5   HGB 11.9* 12.0*    166       BMP:    Recent Labs     08/24/23  0505      K 4.6   *   CO2 20*   BUN 19   CREATININE 1.2   GLUCOSE 99       Hepatic:   No results for input(s): AST, ALT, ALB, BILITOT, ALKPHOS in the last 72 hours. Lipids:   Lab Results   Component Value Date/Time    CHOL 144 08/22/2023 06:19 AM    HDL 31 08/22/2023 06:19 AM    HDL 42 02/09/2011 01:28 PM    TRIG 192 08/22/2023 06:19 AM     Hemoglobin A1C:   Lab Results   Component Value Date/Time    LABA1C 6.3 08/22/2023 06:19 AM     TSH:   Lab Results   Component Value Date/Time    TSH 2.22 08/22/2023 06:19 AM     BNP:   No results for input(s): PROBNP in the last 72 hours.     UA:  Lab Results   Component Value Date/Time    NITRU POSITIVE 10/18/2021 01:31 PM    COLORU Yellow 04/03/2023 11:35 AM    COLORU DARK YELLOW 10/18/2021 01:31 PM    PHUR 6.0 04/03/2023 11:35 AM    PHUR 6.0 10/18/2021 01:31 PM    WBCUA 3-5 10/18/2021 01:31 PM    RBCUA >100 10/18/2021 01:31 PM    BACTERIA Rare 10/18/2021 01:31 PM    CLARITYU Cloudy 04/03/2023 11:35 AM    CLARITYU SL CLOUDY 10/18/2021 01:31 PM    SPECGRAV >=1.030 04/03/2023 11:35 AM    SPECGRAV 1.025 10/18/2021 01:31 PM    LEUKOCYTESUR Large 04/03/2023 11:35 AM    LEUKOCYTESUR SMALL 10/18/2021 01:31 PM    UROBILINOGEN 1.0 10/18/2021 01:31 PM    BILIRUBINUR Negative 04/03/2023 11:35 AM    BLOODU Negative 04/03/2023 11:35 AM    BLOODU LARGE 10/18/2021 01:31 PM    GLUCOSEU Negative 04/03/2023 11:35 AM    GLUCOSEU Negative 10/18/2021 01:31 PM

## 2023-08-26 NOTE — PROCEDURES
[address] [place] [address]                               PULMONARY FUNCTION    PATIENT NAME: David Perez                        :        1950  MED REC NO:   [id number]                          ROOM:       8463  ACCOUNT NO:   [de-identified]                           ADMIT DATE: 2023  PROVIDER:     Ascencion Forrest MD    DATE OF PROCEDURE:  2023    PFT INTERPRETATION    The patient is a 75-year-old male who underwent a PFT. Spirometry shows  FVC to be 68%, FEV1 to be 43%, FEV1 to FVC ratio was 84%, FVV61-47% was  60%. On the basis of this PFT, the patient has a severe obstructive  airway disease. Please correlate clinically.         Ascencion Forrest MD    D: 2023 12:50:18       T: 2023 21:35:34     [id number]  Job#: [id number]     Doc#: [id number]    CC:

## 2023-08-27 LAB
ABO + RH BLD: NORMAL
ANTI-XA UNFRAC HEPARIN: 0.56 IU/ML (ref 0.3–0.7)
BLD GP AB SCN SERPL QL: NORMAL

## 2023-08-27 PROCEDURE — 2060000000 HC ICU INTERMEDIATE R&B

## 2023-08-27 PROCEDURE — 6370000000 HC RX 637 (ALT 250 FOR IP): Performed by: INTERNAL MEDICINE

## 2023-08-27 PROCEDURE — 6370000000 HC RX 637 (ALT 250 FOR IP): Performed by: NURSE PRACTITIONER

## 2023-08-27 PROCEDURE — 85520 HEPARIN ASSAY: CPT

## 2023-08-27 PROCEDURE — 36415 COLL VENOUS BLD VENIPUNCTURE: CPT

## 2023-08-27 PROCEDURE — 6360000002 HC RX W HCPCS: Performed by: INTERNAL MEDICINE

## 2023-08-27 PROCEDURE — 94640 AIRWAY INHALATION TREATMENT: CPT

## 2023-08-27 PROCEDURE — 2580000003 HC RX 258: Performed by: INTERNAL MEDICINE

## 2023-08-27 PROCEDURE — 6370000000 HC RX 637 (ALT 250 FOR IP): Performed by: STUDENT IN AN ORGANIZED HEALTH CARE EDUCATION/TRAINING PROGRAM

## 2023-08-27 RX ORDER — ACETAMINOPHEN 500 MG
1000 TABLET ORAL ONCE
Status: DISCONTINUED | OUTPATIENT
Start: 2023-08-27 | End: 2023-08-27

## 2023-08-27 RX ORDER — ACETAMINOPHEN 500 MG
1000 TABLET ORAL ONCE
Status: COMPLETED | OUTPATIENT
Start: 2023-08-28 | End: 2023-08-28

## 2023-08-27 RX ORDER — CHLORHEXIDINE GLUCONATE 4 G/100ML
SOLUTION TOPICAL SEE ADMIN INSTRUCTIONS
Status: DISCONTINUED | OUTPATIENT
Start: 2023-08-27 | End: 2023-08-28 | Stop reason: HOSPADM

## 2023-08-27 RX ORDER — CEFAZOLIN SODIUM IN 0.9 % NACL 2 G/100 ML
2000 PLASTIC BAG, INJECTION (ML) INTRAVENOUS
Status: COMPLETED | OUTPATIENT
Start: 2023-08-28 | End: 2023-08-28

## 2023-08-27 RX ORDER — CEFAZOLIN SODIUM IN 0.9 % NACL 2 G/100 ML
2000 PLASTIC BAG, INJECTION (ML) INTRAVENOUS
Status: DISCONTINUED | OUTPATIENT
Start: 2023-08-27 | End: 2023-08-27

## 2023-08-27 RX ORDER — BISACODYL 10 MG
10 SUPPOSITORY, RECTAL RECTAL ONCE
Status: COMPLETED | OUTPATIENT
Start: 2023-08-27 | End: 2023-08-27

## 2023-08-27 RX ORDER — SODIUM CHLORIDE, SODIUM LACTATE, POTASSIUM CHLORIDE, CALCIUM CHLORIDE 600; 310; 30; 20 MG/100ML; MG/100ML; MG/100ML; MG/100ML
INJECTION, SOLUTION INTRAVENOUS CONTINUOUS
Status: DISCONTINUED | OUTPATIENT
Start: 2023-08-28 | End: 2023-08-28

## 2023-08-27 RX ADMIN — SODIUM CHLORIDE, PRESERVATIVE FREE 10 ML: 5 INJECTION INTRAVENOUS at 23:52

## 2023-08-27 RX ADMIN — ASPIRIN 81 MG 81 MG: 81 TABLET ORAL at 10:58

## 2023-08-27 RX ADMIN — TIOTROPIUM BROMIDE INHALATION SPRAY 2 PUFF: 3.12 SPRAY, METERED RESPIRATORY (INHALATION) at 07:51

## 2023-08-27 RX ADMIN — SODIUM CHLORIDE, PRESERVATIVE FREE 10 ML: 5 INJECTION INTRAVENOUS at 11:07

## 2023-08-27 RX ADMIN — HEPARIN SODIUM 1170 UNITS/HR: 10000 INJECTION, SOLUTION INTRAVENOUS at 05:59

## 2023-08-27 RX ADMIN — ATORVASTATIN CALCIUM 80 MG: 80 TABLET, FILM COATED ORAL at 10:58

## 2023-08-27 RX ADMIN — POLYETHYLENE GLYCOL 3350 17 G: 17 POWDER, FOR SOLUTION ORAL at 11:04

## 2023-08-27 RX ADMIN — Medication 2 PUFF: at 20:30

## 2023-08-27 RX ADMIN — TAMSULOSIN HYDROCHLORIDE 0.4 MG: 0.4 CAPSULE ORAL at 10:58

## 2023-08-27 RX ADMIN — METHIMAZOLE 5 MG: 5 TABLET ORAL at 11:04

## 2023-08-27 RX ADMIN — OXYBUTYNIN CHLORIDE 5 MG: 5 TABLET ORAL at 10:58

## 2023-08-27 RX ADMIN — OXYBUTYNIN CHLORIDE 5 MG: 5 TABLET ORAL at 23:52

## 2023-08-27 RX ADMIN — SERTRALINE 100 MG: 50 TABLET, FILM COATED ORAL at 10:58

## 2023-08-27 RX ADMIN — BISACODYL 10 MG: 10 SUPPOSITORY RECTAL at 23:52

## 2023-08-27 RX ADMIN — METOPROLOL SUCCINATE 25 MG: 25 TABLET, EXTENDED RELEASE ORAL at 10:58

## 2023-08-27 RX ADMIN — Medication 2 PUFF: at 07:51

## 2023-08-27 NOTE — PROGRESS NOTES
V2.0    [facility] [place]      Name:  Cullen Contreras /Age/Sex: 1950  (74 y.o. male)   MRN & CSN:  [id number] & [id number] Encounter Date/Time: 2023 2:15 PM EDT   Location:  [address] [place] [address] PCP: CHRIS Jacob MD       Hospital Day: 6    Assessment and Recommendations   Cullen Contreras is a 74 y.o. male with pmh of hypertension, hyperlipidemia, CAD (2 stents), PAD (leg stent), COPD, hyperthyroidism, history of bladder cancer and depression who presents with intermittent left-sided chest pain. Troponins were elevated. 1.  Non-ST elevation MI: Cardiology consulted. Echo-EF 40% with hypokinesis of the basal inferior wall. Cardiac catheterization-multiple vessel disease. CTS consult appreciated-for possible CABG and IGLESIA clip tomorrow. Currently on heparin drip and nitroglycerin drip. Continue Toprol, aspirin and Lipitor. 2.  Acute on chronic kidney disease: Baseline creatinine 1.3-1.5. Nephrology consult appreciated. Patient improved with IV fluid hydration. Avoid nephrotoxic medication. 3.  COPD: Not in exacerbation. Continue bronchodilators. 4.  Hyperthyroidism: Continue methimazole. Chronic problems include hypertension, hyperlipidemia, history of bladder cancer (TURBT and BCG) and depression. Diet ADULT DIET; Regular   DVT Prophylaxis [] Lovenox, [x]  Heparin, [] SCDs, [] Ambulation,  [] Eliquis, [] Xarelto  [] Coumadin   Code Status Full Code   Disposition From: He lives with his wife. Expected Disposition: To be determined. Estimated Date of Discharge: 1 to 3 days  Patient requires continued admission due to non-ST elevation MI. Surrogate Decision Maker/ POA       Personally reviewed Lab Studies and Imaging         Subjective:     Chief Complaint:     He denies any complaint. Review of Systems:      Pertinent positives and negatives discussed in HPI    Objective:      Intake/Output Summary (Last 24 hours) at 2023 1406  Last data BLOODU Negative 04/03/2023 11:35 AM    BLOODU LARGE 10/18/2021 01:31 PM    GLUCOSEU Negative 04/03/2023 11:35 AM    GLUCOSEU Negative 10/18/2021 01:31 PM    KETUA Negative 04/03/2023 11:35 AM    KETUA Negative 10/18/2021 01:31 PM     Urine Cultures:   Lab Results   Component Value Date/Time    LABURIN No growth at 18 to 36 hours 04/03/2023 11:45 AM    LABURIN 200 mg/dL 11/14/2022 01:38 PM         Electronically signed by Jaymie Dumont MD on 8/27/2023 at 2:06 PM

## 2023-08-28 ENCOUNTER — ANESTHESIA EVENT (OUTPATIENT)
Dept: OPERATING ROOM | Age: 73
End: 2023-08-28
Payer: MEDICARE

## 2023-08-28 ENCOUNTER — APPOINTMENT (OUTPATIENT)
Dept: GENERAL RADIOLOGY | Age: 73
End: 2023-08-28
Payer: MEDICARE

## 2023-08-28 ENCOUNTER — ANESTHESIA (OUTPATIENT)
Dept: OPERATING ROOM | Age: 73
End: 2023-08-28
Payer: MEDICARE

## 2023-08-28 LAB
ACTIVATED CLOTTING TIME: 112 SEC (ref 99–130)
ACTIVATED CLOTTING TIME: 129 SEC (ref 99–130)
ACTIVATED CLOTTING TIME: 543 SEC (ref 99–130)
ACTIVATED CLOTTING TIME: 579 SEC (ref 99–130)
ACTIVATED CLOTTING TIME: 665 SEC (ref 99–130)
ANION GAP SERPL CALCULATED.3IONS-SCNC: 9 MMOL/L (ref 3–16)
ANTI-XA UNFRAC HEPARIN: 0.48 IU/ML (ref 0.3–0.7)
APTT BLD: 35.6 SEC (ref 22.7–35.9)
BASE EXCESS BLDA CALC-SCNC: -1 MMOL/L (ref -3–3)
BASE EXCESS BLDA CALC-SCNC: -3 MMOL/L (ref -3–3)
BASE EXCESS BLDA CALC-SCNC: -4 MMOL/L (ref -3–3)
BASE EXCESS BLDA CALC-SCNC: -5 MMOL/L (ref -3–3)
BASE EXCESS BLDA CALC-SCNC: -5 MMOL/L (ref -3–3)
BASE EXCESS BLDA CALC-SCNC: -6 MMOL/L (ref -3–3)
BASE EXCESS BLDA CALC-SCNC: 0 MMOL/L (ref -3–3)
BASE EXCESS BLDA CALC-SCNC: 0 MMOL/L (ref -3–3)
BUN SERPL-MCNC: 20 MG/DL (ref 7–20)
CA-I BLD-SCNC: 1.04 MMOL/L (ref 1.12–1.32)
CA-I BLD-SCNC: 1.09 MMOL/L (ref 1.12–1.32)
CA-I BLD-SCNC: 1.19 MMOL/L (ref 1.12–1.32)
CA-I BLD-SCNC: 1.22 MMOL/L (ref 1.12–1.32)
CA-I BLD-SCNC: 1.24 MMOL/L (ref 1.12–1.32)
CA-I BLD-SCNC: 1.32 MMOL/L (ref 1.12–1.32)
CA-I BLD-SCNC: 1.36 MMOL/L (ref 1.12–1.32)
CA-I BLD-SCNC: 1.41 MMOL/L (ref 1.12–1.32)
CA-I BLD-SCNC: 1.51 MMOL/L (ref 1.12–1.32)
CA-I BLD-SCNC: 1.59 MMOL/L (ref 1.12–1.32)
CALCIUM SERPL-MCNC: 10.3 MG/DL (ref 8.3–10.6)
CHLORIDE SERPL-SCNC: 104 MMOL/L (ref 99–110)
CO2 BLDA-SCNC: 21 MMOL/L
CO2 BLDA-SCNC: 21 MMOL/L
CO2 BLDA-SCNC: 22 MMOL/L
CO2 BLDA-SCNC: 23 MMOL/L
CO2 BLDA-SCNC: 24 MMOL/L
CO2 BLDA-SCNC: 26 MMOL/L
CO2 BLDA-SCNC: 27 MMOL/L
CO2 BLDA-SCNC: 27 MMOL/L
CO2 SERPL-SCNC: 22 MMOL/L (ref 21–32)
CREAT SERPL-MCNC: 1.2 MG/DL (ref 0.8–1.3)
DEPRECATED RDW RBC AUTO: 18 % (ref 12.4–15.4)
GFR SERPLBLD CREATININE-BSD FMLA CKD-EPI: >60 ML/MIN/{1.73_M2}
GLUCOSE BLD-MCNC: 119 MG/DL (ref 70–99)
GLUCOSE BLD-MCNC: 121 MG/DL (ref 70–99)
GLUCOSE BLD-MCNC: 124 MG/DL (ref 70–99)
GLUCOSE BLD-MCNC: 126 MG/DL (ref 70–99)
GLUCOSE BLD-MCNC: 128 MG/DL (ref 70–99)
GLUCOSE BLD-MCNC: 129 MG/DL (ref 70–99)
GLUCOSE BLD-MCNC: 136 MG/DL (ref 70–99)
GLUCOSE BLD-MCNC: 149 MG/DL (ref 70–99)
GLUCOSE BLD-MCNC: 151 MG/DL (ref 70–99)
GLUCOSE BLD-MCNC: 153 MG/DL (ref 70–99)
GLUCOSE BLD-MCNC: 157 MG/DL (ref 70–99)
GLUCOSE BLD-MCNC: 164 MG/DL (ref 70–99)
GLUCOSE BLD-MCNC: 98 MG/DL (ref 70–99)
GLUCOSE SERPL-MCNC: 127 MG/DL (ref 70–99)
HCO3 BLDA-SCNC: 19.7 MMOL/L (ref 21–29)
HCO3 BLDA-SCNC: 20 MMOL/L (ref 21–29)
HCO3 BLDA-SCNC: 20.2 MMOL/L (ref 21–29)
HCO3 BLDA-SCNC: 21.1 MMOL/L (ref 21–29)
HCO3 BLDA-SCNC: 21.1 MMOL/L (ref 21–29)
HCO3 BLDA-SCNC: 22 MMOL/L (ref 21–29)
HCO3 BLDA-SCNC: 22.6 MMOL/L (ref 21–29)
HCO3 BLDA-SCNC: 24.6 MMOL/L (ref 21–29)
HCO3 BLDA-SCNC: 25.4 MMOL/L (ref 21–29)
HCO3 BLDA-SCNC: 25.4 MMOL/L (ref 21–29)
HCT VFR BLD AUTO: 24 % (ref 40.5–52.5)
HCT VFR BLD AUTO: 26 % (ref 40.5–52.5)
HCT VFR BLD AUTO: 28 % (ref 40.5–52.5)
HCT VFR BLD AUTO: 30 % (ref 40.5–52.5)
HCT VFR BLD AUTO: 31 % (ref 40.5–52.5)
HCT VFR BLD AUTO: 33 % (ref 40.5–52.5)
HCT VFR BLD AUTO: 34 % (ref 40.5–52.5)
HCT VFR BLD AUTO: 34.3 % (ref 40.5–52.5)
HCT VFR BLD AUTO: 36 % (ref 40.5–52.5)
HCT VFR BLD AUTO: 37 % (ref 40.5–52.5)
HCT VFR BLD AUTO: 37 % (ref 40.5–52.5)
HCT VFR BLD AUTO: 38 % (ref 40.5–52.5)
HGB BLD CALC-MCNC: 10.1 GM/DL (ref 13.5–17.5)
HGB BLD CALC-MCNC: 10.6 GM/DL (ref 13.5–17.5)
HGB BLD CALC-MCNC: 11.3 GM/DL (ref 13.5–17.5)
HGB BLD CALC-MCNC: 11.5 GM/DL (ref 13.5–17.5)
HGB BLD CALC-MCNC: 12.3 GM/DL (ref 13.5–17.5)
HGB BLD CALC-MCNC: 12.5 GM/DL (ref 13.5–17.5)
HGB BLD CALC-MCNC: 12.7 GM/DL (ref 13.5–17.5)
HGB BLD CALC-MCNC: 13.1 GM/DL (ref 13.5–17.5)
HGB BLD CALC-MCNC: 8.2 GM/DL (ref 13.5–17.5)
HGB BLD CALC-MCNC: 9 GM/DL (ref 13.5–17.5)
HGB BLD CALC-MCNC: 9.5 GM/DL (ref 13.5–17.5)
HGB BLD-MCNC: 11.3 G/DL (ref 13.5–17.5)
INR PPP: 1.73 (ref 0.84–1.16)
LACTATE BLD-SCNC: 0.55 MMOL/L (ref 0.4–2)
LACTATE BLD-SCNC: 0.59 MMOL/L (ref 0.4–2)
LACTATE BLD-SCNC: 0.72 MMOL/L (ref 0.4–2)
LACTATE BLD-SCNC: 0.76 MMOL/L (ref 0.4–2)
LACTATE BLD-SCNC: 0.82 MMOL/L (ref 0.4–2)
LACTATE BLD-SCNC: 0.94 MMOL/L (ref 0.4–2)
LACTATE BLD-SCNC: 1.12 MMOL/L (ref 0.4–2)
LACTATE BLD-SCNC: 1.25 MMOL/L (ref 0.4–2)
LACTATE BLD-SCNC: <0.3 MMOL/L (ref 0.4–2)
MAGNESIUM SERPL-MCNC: 3.1 MG/DL (ref 1.8–2.4)
MCH RBC QN AUTO: 26.7 PG (ref 26–34)
MCHC RBC AUTO-ENTMCNC: 32.8 G/DL (ref 31–36)
MCV RBC AUTO: 81.5 FL (ref 80–100)
PCO2 BLDA: 35.2 MM HG (ref 35–45)
PCO2 BLDA: 37.6 MM HG (ref 35–45)
PCO2 BLDA: 37.8 MM HG (ref 35–45)
PCO2 BLDA: 37.9 MM HG (ref 35–45)
PCO2 BLDA: 40.3 MM HG (ref 35–45)
PCO2 BLDA: 41.6 MM HG (ref 35–45)
PCO2 BLDA: 43.4 MM HG (ref 35–45)
PCO2 BLDA: 45.8 MM HG (ref 35–45)
PCO2 BLDA: 47.1 MM HG (ref 35–45)
PCO2 BLDA: 47.9 MM HG (ref 35–45)
PERFORMED ON: ABNORMAL
PH BLDA: 7.29 [PH] (ref 7.35–7.45)
PH BLDA: 7.33 [PH] (ref 7.35–7.45)
PH BLDA: 7.35 [PH] (ref 7.35–7.45)
PH BLDA: 7.4 [PH] (ref 7.35–7.45)
PH BLDA: 7.42 [PH] (ref 7.35–7.45)
PLATELET # BLD AUTO: 100 K/UL (ref 135–450)
PMV BLD AUTO: 7.9 FL (ref 5–10.5)
PO2 BLDA: 118.6 MM HG (ref 75–108)
PO2 BLDA: 152.2 MM HG (ref 75–108)
PO2 BLDA: 451.6 MM HG (ref 75–108)
PO2 BLDA: 500.7 MM HG (ref 75–108)
PO2 BLDA: 596.3 MM HG (ref 75–108)
PO2 BLDA: 72.8 MM HG (ref 75–108)
PO2 BLDA: 77.2 MM HG (ref 75–108)
PO2 BLDA: 79.7 MM HG (ref 75–108)
PO2 BLDA: 86.4 MM HG (ref 75–108)
PO2 BLDA: 99 MM HG (ref 75–108)
POC SAMPLE TYPE: ABNORMAL
POTASSIUM BLD-SCNC: 3.7 MMOL/L (ref 3.5–5.1)
POTASSIUM BLD-SCNC: 3.9 MMOL/L (ref 3.5–5.1)
POTASSIUM BLD-SCNC: 4.2 MMOL/L (ref 3.5–5.1)
POTASSIUM BLD-SCNC: 4.4 MMOL/L (ref 3.5–5.1)
POTASSIUM BLD-SCNC: 4.6 MMOL/L (ref 3.5–5.1)
POTASSIUM BLD-SCNC: 4.6 MMOL/L (ref 3.5–5.1)
POTASSIUM BLD-SCNC: 4.9 MMOL/L (ref 3.5–5.1)
POTASSIUM BLD-SCNC: 5.4 MMOL/L (ref 3.5–5.1)
POTASSIUM BLD-SCNC: 5.9 MMOL/L (ref 3.5–5.1)
POTASSIUM BLD-SCNC: 6.1 MMOL/L (ref 3.5–5.1)
POTASSIUM SERPL-SCNC: 5 MMOL/L (ref 3.5–5.1)
PROTHROMBIN TIME: 20.1 SEC (ref 11.5–14.8)
RBC # BLD AUTO: 4.22 M/UL (ref 4.2–5.9)
SAO2 % BLDA: 100 % (ref 93–100)
SAO2 % BLDA: 93 % (ref 93–100)
SAO2 % BLDA: 94 % (ref 93–100)
SAO2 % BLDA: 95 % (ref 93–100)
SAO2 % BLDA: 96 % (ref 93–100)
SAO2 % BLDA: 98 % (ref 93–100)
SAO2 % BLDA: 98 % (ref 93–100)
SAO2 % BLDA: 99 % (ref 93–100)
SODIUM BLD-SCNC: 134 MMOL/L (ref 136–145)
SODIUM BLD-SCNC: 135 MMOL/L (ref 136–145)
SODIUM BLD-SCNC: 137 MMOL/L (ref 136–145)
SODIUM BLD-SCNC: 138 MMOL/L (ref 136–145)
SODIUM BLD-SCNC: 140 MMOL/L (ref 136–145)
SODIUM BLD-SCNC: 142 MMOL/L (ref 136–145)
SODIUM BLD-SCNC: 143 MMOL/L (ref 136–145)
SODIUM SERPL-SCNC: 135 MMOL/L (ref 136–145)
WBC # BLD AUTO: 12.9 K/UL (ref 4–11)

## 2023-08-28 PROCEDURE — 02L70CK OCCLUSION OF LEFT ATRIAL APPENDAGE WITH EXTRALUMINAL DEVICE, OPEN APPROACH: ICD-10-PCS | Performed by: STUDENT IN AN ORGANIZED HEALTH CARE EDUCATION/TRAINING PROGRAM

## 2023-08-28 PROCEDURE — 33518 CABG ARTERY-VEIN TWO: CPT | Performed by: STUDENT IN AN ORGANIZED HEALTH CARE EDUCATION/TRAINING PROGRAM

## 2023-08-28 PROCEDURE — 2580000003 HC RX 258: Performed by: STUDENT IN AN ORGANIZED HEALTH CARE EDUCATION/TRAINING PROGRAM

## 2023-08-28 PROCEDURE — 33533 CABG ARTERIAL SINGLE: CPT | Performed by: STUDENT IN AN ORGANIZED HEALTH CARE EDUCATION/TRAINING PROGRAM

## 2023-08-28 PROCEDURE — 02HV33Z INSERTION OF INFUSION DEVICE INTO SUPERIOR VENA CAVA, PERCUTANEOUS APPROACH: ICD-10-PCS | Performed by: ANESTHESIOLOGY

## 2023-08-28 PROCEDURE — 36620 INSERTION CATHETER ARTERY: CPT

## 2023-08-28 PROCEDURE — 83605 ASSAY OF LACTIC ACID: CPT

## 2023-08-28 PROCEDURE — 33268 EXCL LAA OPN OTH PX ANY METH: CPT | Performed by: STUDENT IN AN ORGANIZED HEALTH CARE EDUCATION/TRAINING PROGRAM

## 2023-08-28 PROCEDURE — 32551 INSERTION OF CHEST TUBE: CPT

## 2023-08-28 PROCEDURE — 2500000003 HC RX 250 WO HCPCS: Performed by: ANESTHESIOLOGY

## 2023-08-28 PROCEDURE — 84132 ASSAY OF SERUM POTASSIUM: CPT

## 2023-08-28 PROCEDURE — 84295 ASSAY OF SERUM SODIUM: CPT

## 2023-08-28 PROCEDURE — 6360000002 HC RX W HCPCS: Performed by: STUDENT IN AN ORGANIZED HEALTH CARE EDUCATION/TRAINING PROGRAM

## 2023-08-28 PROCEDURE — 85610 PROTHROMBIN TIME: CPT

## 2023-08-28 PROCEDURE — B24BZZ4 ULTRASONOGRAPHY OF HEART WITH AORTA, TRANSESOPHAGEAL: ICD-10-PCS | Performed by: STUDENT IN AN ORGANIZED HEALTH CARE EDUCATION/TRAINING PROGRAM

## 2023-08-28 PROCEDURE — 82803 BLOOD GASES ANY COMBINATION: CPT

## 2023-08-28 PROCEDURE — 85027 COMPLETE CBC AUTOMATED: CPT

## 2023-08-28 PROCEDURE — 94799 UNLISTED PULMONARY SVC/PX: CPT

## 2023-08-28 PROCEDURE — C9290 INJ, BUPIVACAINE LIPOSOME: HCPCS | Performed by: ANESTHESIOLOGY

## 2023-08-28 PROCEDURE — 94761 N-INVAS EAR/PLS OXIMETRY MLT: CPT

## 2023-08-28 PROCEDURE — 6360000002 HC RX W HCPCS: Performed by: ANESTHESIOLOGY

## 2023-08-28 PROCEDURE — 36415 COLL VENOUS BLD VENIPUNCTURE: CPT

## 2023-08-28 PROCEDURE — 2500000003 HC RX 250 WO HCPCS: Performed by: STUDENT IN AN ORGANIZED HEALTH CARE EDUCATION/TRAINING PROGRAM

## 2023-08-28 PROCEDURE — 94640 AIRWAY INHALATION TREATMENT: CPT

## 2023-08-28 PROCEDURE — C1729 CATH, DRAINAGE: HCPCS | Performed by: STUDENT IN AN ORGANIZED HEALTH CARE EDUCATION/TRAINING PROGRAM

## 2023-08-28 PROCEDURE — 85014 HEMATOCRIT: CPT

## 2023-08-28 PROCEDURE — 2700000000 HC OXYGEN THERAPY PER DAY

## 2023-08-28 PROCEDURE — C1889 IMPLANT/INSERT DEVICE, NOC: HCPCS | Performed by: STUDENT IN AN ORGANIZED HEALTH CARE EDUCATION/TRAINING PROGRAM

## 2023-08-28 PROCEDURE — 85347 COAGULATION TIME ACTIVATED: CPT

## 2023-08-28 PROCEDURE — 2709999900 HC NON-CHARGEABLE SUPPLY: Performed by: STUDENT IN AN ORGANIZED HEALTH CARE EDUCATION/TRAINING PROGRAM

## 2023-08-28 PROCEDURE — 76942 ECHO GUIDE FOR BIOPSY: CPT | Performed by: ANESTHESIOLOGY

## 2023-08-28 PROCEDURE — C9399 UNCLASSIFIED DRUGS OR BIOLOG: HCPCS | Performed by: ANESTHESIOLOGY

## 2023-08-28 PROCEDURE — 83735 ASSAY OF MAGNESIUM: CPT

## 2023-08-28 PROCEDURE — 94002 VENT MGMT INPAT INIT DAY: CPT

## 2023-08-28 PROCEDURE — 3600000008 HC SURGERY OHS BASE: Performed by: STUDENT IN AN ORGANIZED HEALTH CARE EDUCATION/TRAINING PROGRAM

## 2023-08-28 PROCEDURE — 3600000018 HC SURGERY OHS ADDTL 15MIN: Performed by: STUDENT IN AN ORGANIZED HEALTH CARE EDUCATION/TRAINING PROGRAM

## 2023-08-28 PROCEDURE — 33508 ENDOSCOPIC VEIN HARVEST: CPT | Performed by: STUDENT IN AN ORGANIZED HEALTH CARE EDUCATION/TRAINING PROGRAM

## 2023-08-28 PROCEDURE — P9045 ALBUMIN (HUMAN), 5%, 250 ML: HCPCS | Performed by: STUDENT IN AN ORGANIZED HEALTH CARE EDUCATION/TRAINING PROGRAM

## 2023-08-28 PROCEDURE — 6370000000 HC RX 637 (ALT 250 FOR IP): Performed by: STUDENT IN AN ORGANIZED HEALTH CARE EDUCATION/TRAINING PROGRAM

## 2023-08-28 PROCEDURE — 51702 INSERT TEMP BLADDER CATH: CPT

## 2023-08-28 PROCEDURE — 2720000010 HC SURG SUPPLY STERILE: Performed by: STUDENT IN AN ORGANIZED HEALTH CARE EDUCATION/TRAINING PROGRAM

## 2023-08-28 PROCEDURE — 33518 CABG ARTERY-VEIN TWO: CPT

## 2023-08-28 PROCEDURE — 3700000001 HC ADD 15 MINUTES (ANESTHESIA): Performed by: STUDENT IN AN ORGANIZED HEALTH CARE EDUCATION/TRAINING PROGRAM

## 2023-08-28 PROCEDURE — 82330 ASSAY OF CALCIUM: CPT

## 2023-08-28 PROCEDURE — 06BP4ZZ EXCISION OF RIGHT SAPHENOUS VEIN, PERCUTANEOUS ENDOSCOPIC APPROACH: ICD-10-PCS | Performed by: STUDENT IN AN ORGANIZED HEALTH CARE EDUCATION/TRAINING PROGRAM

## 2023-08-28 PROCEDURE — 02100Z9 BYPASS CORONARY ARTERY, ONE ARTERY FROM LEFT INTERNAL MAMMARY, OPEN APPROACH: ICD-10-PCS | Performed by: STUDENT IN AN ORGANIZED HEALTH CARE EDUCATION/TRAINING PROGRAM

## 2023-08-28 PROCEDURE — 82947 ASSAY GLUCOSE BLOOD QUANT: CPT

## 2023-08-28 PROCEDURE — 6360000002 HC RX W HCPCS: Performed by: INTERNAL MEDICINE

## 2023-08-28 PROCEDURE — A4217 STERILE WATER/SALINE, 500 ML: HCPCS | Performed by: STUDENT IN AN ORGANIZED HEALTH CARE EDUCATION/TRAINING PROGRAM

## 2023-08-28 PROCEDURE — 36556 INSERT NON-TUNNEL CV CATH: CPT

## 2023-08-28 PROCEDURE — 33533 CABG ARTERIAL SINGLE: CPT

## 2023-08-28 PROCEDURE — 2580000003 HC RX 258: Performed by: ANESTHESIOLOGY

## 2023-08-28 PROCEDURE — 6370000000 HC RX 637 (ALT 250 FOR IP): Performed by: ANESTHESIOLOGY

## 2023-08-28 PROCEDURE — 71045 X-RAY EXAM CHEST 1 VIEW: CPT

## 2023-08-28 PROCEDURE — 3700000000 HC ANESTHESIA ATTENDED CARE: Performed by: STUDENT IN AN ORGANIZED HEALTH CARE EDUCATION/TRAINING PROGRAM

## 2023-08-28 PROCEDURE — 021109W BYPASS CORONARY ARTERY, TWO ARTERIES FROM AORTA WITH AUTOLOGOUS VENOUS TISSUE, OPEN APPROACH: ICD-10-PCS | Performed by: STUDENT IN AN ORGANIZED HEALTH CARE EDUCATION/TRAINING PROGRAM

## 2023-08-28 PROCEDURE — 80048 BASIC METABOLIC PNL TOTAL CA: CPT

## 2023-08-28 PROCEDURE — 85520 HEPARIN ASSAY: CPT

## 2023-08-28 PROCEDURE — 94669 MECHANICAL CHEST WALL OSCILL: CPT

## 2023-08-28 PROCEDURE — 5A1221Z PERFORMANCE OF CARDIAC OUTPUT, CONTINUOUS: ICD-10-PCS | Performed by: STUDENT IN AN ORGANIZED HEALTH CARE EDUCATION/TRAINING PROGRAM

## 2023-08-28 PROCEDURE — C9290 INJ, BUPIVACAINE LIPOSOME: HCPCS | Performed by: STUDENT IN AN ORGANIZED HEALTH CARE EDUCATION/TRAINING PROGRAM

## 2023-08-28 PROCEDURE — 85730 THROMBOPLASTIN TIME PARTIAL: CPT

## 2023-08-28 PROCEDURE — 88300 SURGICAL PATH GROSS: CPT

## 2023-08-28 PROCEDURE — 2100000000 HC CCU R&B

## 2023-08-28 DEVICE — CLIP MED SUTURE LESS 40 MM SYS 1 HND STRL ATRICLIP FLX V: Type: IMPLANTABLE DEVICE | Site: HEART | Status: FUNCTIONAL

## 2023-08-28 RX ORDER — ALBUTEROL SULFATE 2.5 MG/3ML
2.5 SOLUTION RESPIRATORY (INHALATION)
Status: DISCONTINUED | OUTPATIENT
Start: 2023-08-28 | End: 2023-08-29

## 2023-08-28 RX ORDER — ALBUMIN, HUMAN INJ 5% 5 %
25 SOLUTION INTRAVENOUS PRN
Status: COMPLETED | OUTPATIENT
Start: 2023-08-28 | End: 2023-08-28

## 2023-08-28 RX ORDER — ONDANSETRON 4 MG/1
4 TABLET, ORALLY DISINTEGRATING ORAL EVERY 8 HOURS PRN
Status: DISCONTINUED | OUTPATIENT
Start: 2023-08-28 | End: 2023-09-01 | Stop reason: HOSPADM

## 2023-08-28 RX ORDER — PROPOFOL 10 MG/ML
INJECTION, EMULSION INTRAVENOUS PRN
Status: DISCONTINUED | OUTPATIENT
Start: 2023-08-28 | End: 2023-08-28 | Stop reason: SDUPTHER

## 2023-08-28 RX ORDER — INSULIN LISPRO 100 [IU]/ML
0-4 INJECTION, SOLUTION INTRAVENOUS; SUBCUTANEOUS NIGHTLY
Status: DISCONTINUED | OUTPATIENT
Start: 2023-08-31 | End: 2023-09-01 | Stop reason: HOSPADM

## 2023-08-28 RX ORDER — HEPARIN SODIUM 1000 [USP'U]/ML
INJECTION, SOLUTION INTRAVENOUS; SUBCUTANEOUS PRN
Status: DISCONTINUED | OUTPATIENT
Start: 2023-08-28 | End: 2023-08-28 | Stop reason: SDUPTHER

## 2023-08-28 RX ORDER — MORPHINE SULFATE 4 MG/ML
4 INJECTION, SOLUTION INTRAMUSCULAR; INTRAVENOUS
Status: DISCONTINUED | OUTPATIENT
Start: 2023-08-28 | End: 2023-08-30

## 2023-08-28 RX ORDER — MORPHINE SULFATE 2 MG/ML
2 INJECTION, SOLUTION INTRAMUSCULAR; INTRAVENOUS
Status: DISCONTINUED | OUTPATIENT
Start: 2023-08-28 | End: 2023-08-31

## 2023-08-28 RX ORDER — PROTAMINE SULFATE 10 MG/ML
50 INJECTION, SOLUTION INTRAVENOUS
Status: ACTIVE | OUTPATIENT
Start: 2023-08-28 | End: 2023-08-29

## 2023-08-28 RX ORDER — FONDAPARINUX SODIUM 2.5 MG/.5ML
2.5 INJECTION SUBCUTANEOUS DAILY
Status: DISCONTINUED | OUTPATIENT
Start: 2023-08-29 | End: 2023-08-30

## 2023-08-28 RX ORDER — ASPIRIN 81 MG/1
81 TABLET ORAL DAILY
Status: DISCONTINUED | OUTPATIENT
Start: 2023-08-29 | End: 2023-09-01 | Stop reason: HOSPADM

## 2023-08-28 RX ORDER — HYDRALAZINE HYDROCHLORIDE 20 MG/ML
5 INJECTION INTRAMUSCULAR; INTRAVENOUS EVERY 5 MIN PRN
Status: DISCONTINUED | OUTPATIENT
Start: 2023-08-28 | End: 2023-08-30

## 2023-08-28 RX ORDER — FAMOTIDINE 20 MG/1
20 TABLET, FILM COATED ORAL DAILY
Status: DISCONTINUED | OUTPATIENT
Start: 2023-08-29 | End: 2023-09-01 | Stop reason: HOSPADM

## 2023-08-28 RX ORDER — ACETAMINOPHEN 325 MG/1
650 TABLET ORAL EVERY 6 HOURS
Status: DISCONTINUED | OUTPATIENT
Start: 2023-08-29 | End: 2023-09-01 | Stop reason: HOSPADM

## 2023-08-28 RX ORDER — MEPERIDINE HYDROCHLORIDE 50 MG/ML
25 INJECTION INTRAMUSCULAR; INTRAVENOUS; SUBCUTANEOUS
Status: DISCONTINUED | OUTPATIENT
Start: 2023-08-28 | End: 2023-08-29

## 2023-08-28 RX ORDER — SODIUM CHLORIDE 0.9 % (FLUSH) 0.9 %
5-40 SYRINGE (ML) INJECTION EVERY 12 HOURS SCHEDULED
Status: DISCONTINUED | OUTPATIENT
Start: 2023-08-28 | End: 2023-09-01 | Stop reason: HOSPADM

## 2023-08-28 RX ORDER — MAGNESIUM SULFATE IN WATER 40 MG/ML
2000 INJECTION, SOLUTION INTRAVENOUS PRN
Status: DISCONTINUED | OUTPATIENT
Start: 2023-08-28 | End: 2023-09-01 | Stop reason: HOSPADM

## 2023-08-28 RX ORDER — CHLORHEXIDINE GLUCONATE 0.12 MG/ML
15 RINSE ORAL 2 TIMES DAILY
Status: DISCONTINUED | OUTPATIENT
Start: 2023-08-28 | End: 2023-09-01 | Stop reason: HOSPADM

## 2023-08-28 RX ORDER — CEFAZOLIN SODIUM IN 0.9 % NACL 2 G/100 ML
2000 PLASTIC BAG, INJECTION (ML) INTRAVENOUS EVERY 8 HOURS
Status: COMPLETED | OUTPATIENT
Start: 2023-08-28 | End: 2023-08-30

## 2023-08-28 RX ORDER — POLYETHYLENE GLYCOL 3350 17 G/17G
17 POWDER, FOR SOLUTION ORAL DAILY
Status: DISCONTINUED | OUTPATIENT
Start: 2023-08-29 | End: 2023-09-01 | Stop reason: HOSPADM

## 2023-08-28 RX ORDER — BISACODYL 5 MG/1
5 TABLET, DELAYED RELEASE ORAL DAILY
Status: DISCONTINUED | OUTPATIENT
Start: 2023-08-29 | End: 2023-09-01 | Stop reason: HOSPADM

## 2023-08-28 RX ORDER — FUROSEMIDE 10 MG/ML
20 INJECTION INTRAMUSCULAR; INTRAVENOUS 4 TIMES DAILY
Status: DISCONTINUED | OUTPATIENT
Start: 2023-08-29 | End: 2023-08-30

## 2023-08-28 RX ORDER — METHIMAZOLE 5 MG/1
5 TABLET ORAL DAILY
COMMUNITY

## 2023-08-28 RX ORDER — KETAMINE HCL IN NACL, ISO-OSM 100MG/10ML
SYRINGE (ML) INJECTION PRN
Status: DISCONTINUED | OUTPATIENT
Start: 2023-08-28 | End: 2023-08-28 | Stop reason: SDUPTHER

## 2023-08-28 RX ORDER — MIDAZOLAM HYDROCHLORIDE 1 MG/ML
INJECTION INTRAMUSCULAR; INTRAVENOUS PRN
Status: DISCONTINUED | OUTPATIENT
Start: 2023-08-28 | End: 2023-08-28 | Stop reason: SDUPTHER

## 2023-08-28 RX ORDER — DEXTROSE AND SODIUM CHLORIDE 5; .45 G/100ML; G/100ML
INJECTION, SOLUTION INTRAVENOUS CONTINUOUS
Status: DISCONTINUED | OUTPATIENT
Start: 2023-08-28 | End: 2023-08-29

## 2023-08-28 RX ORDER — ASPIRIN 325 MG
325 TABLET, DELAYED RELEASE (ENTERIC COATED) ORAL ONCE
Status: COMPLETED | OUTPATIENT
Start: 2023-08-28 | End: 2023-08-28

## 2023-08-28 RX ORDER — SODIUM CHLORIDE 9 MG/ML
INJECTION, SOLUTION INTRAVENOUS CONTINUOUS PRN
Status: DISCONTINUED | OUTPATIENT
Start: 2023-08-28 | End: 2023-08-28 | Stop reason: SDUPTHER

## 2023-08-28 RX ORDER — CALCIUM CHLORIDE 100 MG/ML
INJECTION INTRAVENOUS; INTRAVENTRICULAR PRN
Status: DISCONTINUED | OUTPATIENT
Start: 2023-08-28 | End: 2023-08-28 | Stop reason: SDUPTHER

## 2023-08-28 RX ORDER — CLOPIDOGREL BISULFATE 75 MG/1
75 TABLET ORAL DAILY
Status: DISCONTINUED | OUTPATIENT
Start: 2023-08-29 | End: 2023-09-01 | Stop reason: HOSPADM

## 2023-08-28 RX ORDER — POTASSIUM CHLORIDE 29.8 MG/ML
20 INJECTION INTRAVENOUS PRN
Status: DISCONTINUED | OUTPATIENT
Start: 2023-08-28 | End: 2023-09-01 | Stop reason: HOSPADM

## 2023-08-28 RX ORDER — INSULIN GLARGINE 100 [IU]/ML
0.15 INJECTION, SOLUTION SUBCUTANEOUS NIGHTLY
Status: DISCONTINUED | OUTPATIENT
Start: 2023-08-30 | End: 2023-09-01 | Stop reason: HOSPADM

## 2023-08-28 RX ORDER — DEXMEDETOMIDINE HYDROCHLORIDE 4 UG/ML
1 INJECTION, SOLUTION INTRAVENOUS
Status: DISCONTINUED | OUTPATIENT
Start: 2023-08-28 | End: 2023-08-29

## 2023-08-28 RX ORDER — MIDAZOLAM HYDROCHLORIDE 1 MG/ML
1 INJECTION INTRAMUSCULAR; INTRAVENOUS
Status: DISCONTINUED | OUTPATIENT
Start: 2023-08-28 | End: 2023-08-29

## 2023-08-28 RX ORDER — PROTAMINE SULFATE 10 MG/ML
INJECTION, SOLUTION INTRAVENOUS PRN
Status: DISCONTINUED | OUTPATIENT
Start: 2023-08-28 | End: 2023-08-28 | Stop reason: SDUPTHER

## 2023-08-28 RX ORDER — ONDANSETRON 2 MG/ML
4 INJECTION INTRAMUSCULAR; INTRAVENOUS EVERY 6 HOURS PRN
Status: DISCONTINUED | OUTPATIENT
Start: 2023-08-28 | End: 2023-09-01 | Stop reason: HOSPADM

## 2023-08-28 RX ORDER — POTASSIUM CHLORIDE 750 MG/1
20 TABLET, EXTENDED RELEASE ORAL 2 TIMES DAILY
Status: DISCONTINUED | OUTPATIENT
Start: 2023-08-29 | End: 2023-09-01

## 2023-08-28 RX ORDER — METOPROLOL TARTRATE 5 MG/5ML
2.5 INJECTION INTRAVENOUS EVERY 10 MIN PRN
Status: DISCONTINUED | OUTPATIENT
Start: 2023-08-28 | End: 2023-08-30

## 2023-08-28 RX ORDER — LANOLIN ALCOHOL/MO/W.PET/CERES
400 CREAM (GRAM) TOPICAL 2 TIMES DAILY
Status: DISCONTINUED | OUTPATIENT
Start: 2023-08-29 | End: 2023-09-01 | Stop reason: HOSPADM

## 2023-08-28 RX ORDER — METHOCARBAMOL 750 MG/1
750 TABLET, FILM COATED ORAL 3 TIMES DAILY
Status: DISCONTINUED | OUTPATIENT
Start: 2023-08-28 | End: 2023-09-01 | Stop reason: HOSPADM

## 2023-08-28 RX ORDER — BUPIVACAINE HYDROCHLORIDE 2.5 MG/ML
INJECTION, SOLUTION EPIDURAL; INFILTRATION; INTRACAUDAL
Status: COMPLETED | OUTPATIENT
Start: 2023-08-28 | End: 2023-08-28

## 2023-08-28 RX ORDER — OXYCODONE HYDROCHLORIDE 5 MG/1
5 TABLET ORAL EVERY 4 HOURS PRN
Status: DISCONTINUED | OUTPATIENT
Start: 2023-08-28 | End: 2023-09-01 | Stop reason: HOSPADM

## 2023-08-28 RX ORDER — SODIUM CHLORIDE 9 MG/ML
INJECTION, SOLUTION INTRAVENOUS PRN
Status: DISCONTINUED | OUTPATIENT
Start: 2023-08-28 | End: 2023-09-01 | Stop reason: HOSPADM

## 2023-08-28 RX ORDER — ATROPINE SULFATE 0.4 MG/ML
INJECTION, SOLUTION INTRAMUSCULAR; INTRAVENOUS; SUBCUTANEOUS PRN
Status: DISCONTINUED | OUTPATIENT
Start: 2023-08-28 | End: 2023-08-28 | Stop reason: SDUPTHER

## 2023-08-28 RX ORDER — FUROSEMIDE 40 MG/1
40 TABLET ORAL 2 TIMES DAILY
Status: DISCONTINUED | OUTPATIENT
Start: 2023-08-31 | End: 2023-09-01

## 2023-08-28 RX ORDER — SODIUM CHLORIDE 0.9 % (FLUSH) 0.9 %
5-40 SYRINGE (ML) INJECTION PRN
Status: DISCONTINUED | OUTPATIENT
Start: 2023-08-28 | End: 2023-09-01 | Stop reason: HOSPADM

## 2023-08-28 RX ORDER — ROCURONIUM BROMIDE 10 MG/ML
INJECTION, SOLUTION INTRAVENOUS PRN
Status: DISCONTINUED | OUTPATIENT
Start: 2023-08-28 | End: 2023-08-28 | Stop reason: SDUPTHER

## 2023-08-28 RX ORDER — DEXMEDETOMIDINE HYDROCHLORIDE 4 UG/ML
0.2 INJECTION, SOLUTION INTRAVENOUS
Status: COMPLETED | OUTPATIENT
Start: 2023-08-28 | End: 2023-08-28

## 2023-08-28 RX ORDER — ATORVASTATIN CALCIUM 40 MG/1
40 TABLET, FILM COATED ORAL NIGHTLY
Status: DISCONTINUED | OUTPATIENT
Start: 2023-08-29 | End: 2023-09-01 | Stop reason: HOSPADM

## 2023-08-28 RX ORDER — INSULIN LISPRO 100 [IU]/ML
0-8 INJECTION, SOLUTION INTRAVENOUS; SUBCUTANEOUS
Status: DISCONTINUED | OUTPATIENT
Start: 2023-08-31 | End: 2023-09-01 | Stop reason: HOSPADM

## 2023-08-28 RX ORDER — FAMOTIDINE 10 MG/ML
20 INJECTION, SOLUTION INTRAVENOUS DAILY
Status: DISCONTINUED | OUTPATIENT
Start: 2023-08-29 | End: 2023-08-31

## 2023-08-28 RX ORDER — FENTANYL CITRATE 50 UG/ML
INJECTION, SOLUTION INTRAMUSCULAR; INTRAVENOUS PRN
Status: DISCONTINUED | OUTPATIENT
Start: 2023-08-28 | End: 2023-08-28 | Stop reason: SDUPTHER

## 2023-08-28 RX ORDER — DEXTROSE MONOHYDRATE 100 MG/ML
INJECTION, SOLUTION INTRAVENOUS CONTINUOUS PRN
Status: DISCONTINUED | OUTPATIENT
Start: 2023-08-28 | End: 2023-09-01 | Stop reason: HOSPADM

## 2023-08-28 RX ADMIN — HEPARIN SODIUM 1170 UNITS/HR: 10000 INJECTION, SOLUTION INTRAVENOUS at 01:04

## 2023-08-28 RX ADMIN — SUGAMMADEX 200 MG: 100 INJECTION, SOLUTION INTRAVENOUS at 11:28

## 2023-08-28 RX ADMIN — MORPHINE SULFATE 4 MG: 4 INJECTION, SOLUTION INTRAMUSCULAR; INTRAVENOUS at 23:29

## 2023-08-28 RX ADMIN — POTASSIUM CHLORIDE 20 MEQ: 29.8 INJECTION, SOLUTION INTRAVENOUS at 22:27

## 2023-08-28 RX ADMIN — MUPIROCIN: 20 OINTMENT TOPICAL at 19:15

## 2023-08-28 RX ADMIN — SODIUM BICARBONATE 50 MEQ: 84 INJECTION INTRAVENOUS at 12:13

## 2023-08-28 RX ADMIN — BUPIVACAINE HYDROCHLORIDE 20 ML: 2.5 INJECTION, SOLUTION EPIDURAL; INFILTRATION; INTRACAUDAL; PERINEURAL at 07:25

## 2023-08-28 RX ADMIN — CALCIUM CHLORIDE 1 G: 100 INJECTION, SOLUTION INTRAVENOUS at 10:59

## 2023-08-28 RX ADMIN — MORPHINE SULFATE 2 MG: 2 INJECTION, SOLUTION INTRAMUSCULAR; INTRAVENOUS at 15:59

## 2023-08-28 RX ADMIN — FENTANYL CITRATE 50 MCG: 50 INJECTION, SOLUTION INTRAMUSCULAR; INTRAVENOUS at 07:12

## 2023-08-28 RX ADMIN — MUPIROCIN: 20 OINTMENT TOPICAL at 00:30

## 2023-08-28 RX ADMIN — HEPARIN SODIUM 37000 UNITS: 1000 INJECTION, SOLUTION INTRAVENOUS; SUBCUTANEOUS at 09:18

## 2023-08-28 RX ADMIN — SODIUM CHLORIDE 3 UNITS/HR: 9 INJECTION, SOLUTION INTRAVENOUS at 09:22

## 2023-08-28 RX ADMIN — MORPHINE SULFATE 2 MG: 2 INJECTION, SOLUTION INTRAMUSCULAR; INTRAVENOUS at 17:46

## 2023-08-28 RX ADMIN — ALBUMIN (HUMAN) 25 G: 12.5 INJECTION, SOLUTION INTRAVENOUS at 19:18

## 2023-08-28 RX ADMIN — Medication 10 ML: at 19:16

## 2023-08-28 RX ADMIN — ACETAMINOPHEN 1000 MG: 500 TABLET ORAL at 05:16

## 2023-08-28 RX ADMIN — VANCOMYCIN HYDROCHLORIDE 1500 MG: 10 INJECTION, POWDER, LYOPHILIZED, FOR SOLUTION INTRAVENOUS at 07:36

## 2023-08-28 RX ADMIN — Medication 2000 MG: at 16:09

## 2023-08-28 RX ADMIN — CHLORHEXIDINE GLUCONATE 15 ML: 1.2 RINSE ORAL at 19:15

## 2023-08-28 RX ADMIN — PROTAMINE SULFATE 370 MG: 10 INJECTION, SOLUTION INTRAVENOUS at 10:59

## 2023-08-28 RX ADMIN — AMINOCAPROIC ACID 5000 MG: 250 INJECTION, SOLUTION INTRAVENOUS at 07:45

## 2023-08-28 RX ADMIN — PROPOFOL 100 MG: 10 INJECTION, EMULSION INTRAVENOUS at 07:12

## 2023-08-28 RX ADMIN — ROCURONIUM BROMIDE 50 MG: 50 INJECTION, SOLUTION INTRAVENOUS at 07:12

## 2023-08-28 RX ADMIN — SODIUM CHLORIDE: 9 INJECTION, SOLUTION INTRAVENOUS at 16:08

## 2023-08-28 RX ADMIN — Medication 1500 MG: at 19:15

## 2023-08-28 RX ADMIN — DEXMEDETOMIDINE 0.5 MCG/KG/HR: 100 INJECTION, SOLUTION INTRAVENOUS at 09:22

## 2023-08-28 RX ADMIN — ALBUTEROL SULFATE 2.5 MG: 2.5 SOLUTION RESPIRATORY (INHALATION) at 19:32

## 2023-08-28 RX ADMIN — ALBUTEROL SULFATE 2.5 MG: 2.5 SOLUTION RESPIRATORY (INHALATION) at 15:51

## 2023-08-28 RX ADMIN — ALBUMIN (HUMAN) 25 G: 12.5 INJECTION, SOLUTION INTRAVENOUS at 20:17

## 2023-08-28 RX ADMIN — ROCURONIUM BROMIDE 25 MG: 50 INJECTION, SOLUTION INTRAVENOUS at 10:47

## 2023-08-28 RX ADMIN — SODIUM CHLORIDE, POTASSIUM CHLORIDE, SODIUM LACTATE AND CALCIUM CHLORIDE: 600; 310; 30; 20 INJECTION, SOLUTION INTRAVENOUS at 06:23

## 2023-08-28 RX ADMIN — BUPIVACAINE 20 ML: 13.3 INJECTION, SUSPENSION, LIPOSOMAL INFILTRATION at 07:25

## 2023-08-28 RX ADMIN — OXYCODONE HYDROCHLORIDE 5 MG: 5 TABLET ORAL at 17:50

## 2023-08-28 RX ADMIN — SODIUM BICARBONATE 50 MEQ: 84 INJECTION INTRAVENOUS at 22:47

## 2023-08-28 RX ADMIN — SODIUM CHLORIDE 5 MCG/MIN: 9 INJECTION, SOLUTION INTRAVENOUS at 07:55

## 2023-08-28 RX ADMIN — SODIUM BICARBONATE 50 MEQ: 84 INJECTION INTRAVENOUS at 14:02

## 2023-08-28 RX ADMIN — ATROPINE SULFATE 0.5 MG: 0.4 INJECTION, SOLUTION INTRAMUSCULAR; INTRAVENOUS; SUBCUTANEOUS at 08:15

## 2023-08-28 RX ADMIN — SODIUM CHLORIDE: 9 INJECTION, SOLUTION INTRAVENOUS at 07:25

## 2023-08-28 RX ADMIN — FENTANYL CITRATE 150 MCG: 50 INJECTION, SOLUTION INTRAMUSCULAR; INTRAVENOUS at 11:26

## 2023-08-28 RX ADMIN — SODIUM CHLORIDE 0.64 UNITS/HR: 9 INJECTION, SOLUTION INTRAVENOUS at 12:20

## 2023-08-28 RX ADMIN — POTASSIUM CHLORIDE 20 MEQ: 29.8 INJECTION, SOLUTION INTRAVENOUS at 21:33

## 2023-08-28 RX ADMIN — Medication 50 MG: at 08:15

## 2023-08-28 RX ADMIN — DEXTROSE AND SODIUM CHLORIDE: 5; 450 INJECTION, SOLUTION INTRAVENOUS at 12:24

## 2023-08-28 RX ADMIN — SODIUM CHLORIDE 5 MG/HR: 9 INJECTION, SOLUTION INTRAVENOUS at 11:19

## 2023-08-28 RX ADMIN — MORPHINE SULFATE 4 MG: 4 INJECTION, SOLUTION INTRAMUSCULAR; INTRAVENOUS at 21:37

## 2023-08-28 RX ADMIN — ASPIRIN 325 MG: 325 TABLET, COATED ORAL at 17:48

## 2023-08-28 RX ADMIN — AMINOCAPROIC ACID 1 G/HR: 250 INJECTION, SOLUTION INTRAVENOUS at 07:55

## 2023-08-28 RX ADMIN — SODIUM CHLORIDE 2 MCG/MIN: 9 INJECTION, SOLUTION INTRAVENOUS at 13:15

## 2023-08-28 RX ADMIN — Medication 2000 MG: at 22:28

## 2023-08-28 RX ADMIN — METHOCARBAMOL 750 MG: 750 TABLET ORAL at 20:12

## 2023-08-28 RX ADMIN — FENTANYL CITRATE 200 MCG: 50 INJECTION, SOLUTION INTRAMUSCULAR; INTRAVENOUS at 08:07

## 2023-08-28 RX ADMIN — FENTANYL CITRATE 250 MCG: 50 INJECTION, SOLUTION INTRAMUSCULAR; INTRAVENOUS at 09:28

## 2023-08-28 RX ADMIN — ROCURONIUM BROMIDE 50 MG: 50 INJECTION, SOLUTION INTRAVENOUS at 09:36

## 2023-08-28 RX ADMIN — FENTANYL CITRATE 250 MCG: 50 INJECTION, SOLUTION INTRAMUSCULAR; INTRAVENOUS at 08:24

## 2023-08-28 RX ADMIN — CEFAZOLIN 2000 MG: 10 INJECTION, POWDER, FOR SOLUTION INTRAVENOUS at 07:54

## 2023-08-28 RX ADMIN — MIDAZOLAM 2 MG: 1 INJECTION INTRAMUSCULAR; INTRAVENOUS at 07:12

## 2023-08-28 RX ADMIN — FENTANYL CITRATE 100 MCG: 50 INJECTION, SOLUTION INTRAMUSCULAR; INTRAVENOUS at 10:51

## 2023-08-28 RX ADMIN — METOPROLOL TARTRATE 12.5 MG: 25 TABLET, FILM COATED ORAL at 05:17

## 2023-08-28 RX ADMIN — MIDAZOLAM 2 MG: 1 INJECTION INTRAMUSCULAR; INTRAVENOUS at 11:26

## 2023-08-28 RX ADMIN — VASOPRESSIN 0.04 UNITS/MIN: 20 INJECTION INTRAVENOUS at 22:55

## 2023-08-28 RX ADMIN — MORPHINE SULFATE 2 MG: 2 INJECTION, SOLUTION INTRAMUSCULAR; INTRAVENOUS at 19:42

## 2023-08-28 ASSESSMENT — PAIN DESCRIPTION - LOCATION
LOCATION: CHEST

## 2023-08-28 ASSESSMENT — PAIN DESCRIPTION - DESCRIPTORS
DESCRIPTORS: ACHING;SHARP;STABBING
DESCRIPTORS: SORE
DESCRIPTORS: SORE
DESCRIPTORS: ACHING;SORE
DESCRIPTORS: ACHING;SHARP;STABBING
DESCRIPTORS: ACHING

## 2023-08-28 ASSESSMENT — PULMONARY FUNCTION TESTS
PIF_VALUE: 16
PIF_VALUE: 15
PIF_VALUE: 22
PIF_VALUE: 16
PIF_VALUE: 21

## 2023-08-28 ASSESSMENT — PAIN SCALES - GENERAL
PAINLEVEL_OUTOF10: 10
PAINLEVEL_OUTOF10: 4
PAINLEVEL_OUTOF10: 7
PAINLEVEL_OUTOF10: 8
PAINLEVEL_OUTOF10: 0
PAINLEVEL_OUTOF10: 8
PAINLEVEL_OUTOF10: 10
PAINLEVEL_OUTOF10: 4
PAINLEVEL_OUTOF10: 8
PAINLEVEL_OUTOF10: 10

## 2023-08-28 ASSESSMENT — PAIN DESCRIPTION - ORIENTATION
ORIENTATION: MID
ORIENTATION: LEFT
ORIENTATION: LEFT
ORIENTATION: RIGHT;LEFT
ORIENTATION: LEFT
ORIENTATION: LEFT

## 2023-08-28 ASSESSMENT — PAIN - FUNCTIONAL ASSESSMENT
PAIN_FUNCTIONAL_ASSESSMENT: PREVENTS OR INTERFERES SOME ACTIVE ACTIVITIES AND ADLS
PAIN_FUNCTIONAL_ASSESSMENT: PREVENTS OR INTERFERES SOME ACTIVE ACTIVITIES AND ADLS
PAIN_FUNCTIONAL_ASSESSMENT: PREVENTS OR INTERFERES WITH ALL ACTIVE AND SOME PASSIVE ACTIVITIES

## 2023-08-28 ASSESSMENT — LIFESTYLE VARIABLES: SMOKING_STATUS: 0

## 2023-08-28 NOTE — ANESTHESIA PROCEDURE NOTES
Procedure Performed: GINA       Start Time:        End Time:      Preanesthesia Checklist:  Patient identified, IV assessed, risks and benefits discussed, monitors and equipment assessed, procedure being performed at surgeon's request and anesthesia consent obtained. General Procedure Information  Diagnostic Indications for Echo:  assessment of ascending aorta, assessment of surgical repair, hemodynamic monitoring and assessment of valve function  Physician Requesting Echo: Aris Tracy MD  Location performed:  OR  Intubated  Bite block placed  Heart visualized  Probe Insertion:  Easy  Probe Type:  3D  Modalities:  3D, color flow mapping and continuous wave Doppler    Echocardiographic and Doppler Measurements    Ventricles    Right Ventricle:  Cavity size normal.  Hypertrophy not present. Thrombus not present. Global function normal.    Left Ventricle:  Cavity size normal.  Hypertrophy present. Thrombus not present. Global Function moderately impaired. Ejection Fraction 40%. Other Ventricular Findings:       Septal hypertrophy    Ventricular Regional Function:  1- Basal Anteroseptal:  normal  2- Basal Anterior:  normal  3- Basal Anterolateral:  normal  4- Basal Inferolateral:  normal  5- Basal Inferior:  normal  6- Basal Inferoseptal:  normal  7- Mid Anteroseptal:  normal  8- Mid Anterior:  normal  9- Mid Anterolateral:  normal  10- Mid Inferolateral:  normal  11- Mid Inferior:  normal  12- Mid Inferoseptal:  normal  13- Apical Anterior:  normal  14- Apical Lateral:  normal  15- Apical Inferior:  normal  16- Apical Septal:  normal  17- [place]:  normal      Valves    Aortic Valve: Annulus normal.  Stenosis not present. Regurgitation none. Leaflets normal.  Leaflet motions normal and restricted. Specific leaflet segments with abnormal motions are described in the following comments:       NCC    Mitral Valve: Annulus normal.  Stenosis not present. Regurgitation mild.   Leaflets normal.  Leaflet

## 2023-08-28 NOTE — PROGRESS NOTES
08/28/23 1205   Patient Observation   Pulse 70   Respirations 18   SpO2 99 %   Breath Sounds   Right Upper Lobe Diminished   Right Middle Lobe Diminished   Right Lower Lobe Diminished   Left Upper Lobe Diminished   Left Lower Lobe Diminished   Vent Information   Equipment Changed HME   Ventilator Initiate Yes   $Ventilation $Initial Day   Vent Patient Data (Readings)   Vt (Measured) 514 mL   Peak Inspiratory Pressure (cmH2O) 21 cmH2O   Rate Measured 18 br/min   Minute Volume (L/min) 7.02 Liters   Peak Inspiratory Flow (lpm) 45 L/sec   Mean Airway Pressure (cmH2O) 8.6 cmH20   I:E Ratio 1:2.5   Backup Apnea On   Backup Rate 14 Breaths Per Minute   Backup Vt 600   Vent Alarm Settings   High Pressure (cmH2O) 40 cmH2O   Low Minute Volume (lpm) 2 L/min   High Minute Volume (lpm) 20 L/min   Low Exhaled Vt (ml) 200 mL   High Exhaled Vt (ml) 1000 mL   RR High (bpm) 40 br/min   Apnea (secs) 20 secs   Additional Respiratoray Assessments   Humidification Source HME   Ambu Bag With Mask At Bedside Yes   ETT    Placement Date/Time: 08/28/23 0715   Placed By: In surgery  Placement Verified By: Auscultation;Capnometry  Preoxygenation: Yes  Mask Ventilation: Ventilated by mask (1)  Technique: Direct laryngoscopy  Airway Type: Standard  Airway Tube Size: 7.5 mm . ..    Secured At 23 cm   Measured From Lips   ETT Placement Right   Secured By Commercial tube francis   Site Assessment Dry   Cuff Pressure 32 cm H2O   Tie/Francis Changed Yes   Supplemental Gases   Supplemental Gases None   Ventilator Associated Pneumonia Bundle   Oral Suctioning No   ETT/Trach Suctioning No   Respiratory   Respiratory Interventions   (received pt from OR, PLACEDPT ON DONNA ETT FRANCIS)

## 2023-08-28 NOTE — ANESTHESIA PROCEDURE NOTES
Central Venous Line:    A central venous line was placed using ultrasound guidance, in the holding area for the following indication(s): central venous access and CVP monitoring. Sterility preparation included the following: hand hygiene performed prior to procedure, maximum sterile barriers used and sterile technique used to drape from head to toe. The patient was placed in Trendelenburg position. The right internal jugular vein was prepped. The site was prepped with Chloraprep. A 9 Fr (size), 12 (length), introducer triple lumen was placed. During the procedure, the following specific steps were taken: target vein identified, needle advanced into vein and blood aspirated and guidewire advanced into vein. Intravenous verification was obtained by ultrasound and venous blood return. Post insertion care included: all ports aspirated, all ports flushed easily, guidewire removed intact, Biopatch applied, line sutured in place and dressing applied. During the procedure the patient experienced: patient tolerated procedure well with no complications and EBL < 5mL.       Anesthesia type: local..No  Staffing  Performed: Anesthesiologist   Anesthesiologist: Meenakshi Colón MD  Preanesthetic Checklist  Completed: patient identified, IV checked, site marked, risks and benefits discussed, surgical/procedural consents, equipment checked, pre-op evaluation, timeout performed, anesthesia consent given, oxygen available and monitors applied/VS acknowledged

## 2023-08-28 NOTE — ANESTHESIA POSTPROCEDURE EVALUATION
Department of Anesthesiology  Postprocedure Note    Patient: Cristopher Saucedo  MRN: [id number]  YOB: 1950  Date of evaluation: 8/28/2023      Procedure Summary     Date: 08/28/23 Room / Location: [facility] 09 / [address] [place] [address]    Anesthesia Start: 0700 Anesthesia Stop: 1209    Procedure: CORONARY ARTERY BYPASS GRAFTING TIMES THREE USING INTERNAL MAMMARY ARTERY AND RIGHT SAPHNOUS VEIN VIA ENDOSCOPIC HARVEST WITH LEFT ATRIAL APPENDAGE CLIP PLACEMENT, PLACEMENT TEMPORARY PACING WIRES, ON PUMP, GINA, BILATERAL PECTORALIS BLOCKS Diagnosis:       Coronary artery disease, unspecified vessel or lesion type, unspecified whether angina present, unspecified whether native or transplanted heart      (Coronary artery disease, unspecified vessel or lesion type, unspecified whether angina present, unspecified whether native or transplanted heart [I25.10])    Surgeons: Karlee Elmore MD Responsible Provider: Rita Bonilla MD    Anesthesia Type: general ASA Status: 4          Anesthesia Type: No value filed.     Lu Phase I:      Lu Phase II:        Anesthesia Post Evaluation    Patient location during evaluation: ICU  Patient participation: complete - patient cannot participate  Level of consciousness: sedated and ventilated  Airway patency: patent  Nausea & Vomiting: no nausea and no vomiting  Complications: no  Cardiovascular status: blood pressure returned to baseline  Respiratory status: acceptable  Hydration status: euvolemic  Multimodal analgesia pain management approach  Pain management: adequate

## 2023-08-28 NOTE — PROGRESS NOTES
Patient awake, alert x 3 and following commands. Patient placed on CPAP per the Initiate RN/RT Driven Ventilator Weaning Assessment and Protocol. ABG drawn on admission from surgery, if the patient became hemodynamically unstable, AND prior to extubation. Respiratory called for weaning parameters. NIF -26, RSBI 30, 32. Patient met extubation parameters or MD order received for extubation. Patient suctioned and extubated to 2 liters nasal cannula. Patient tolerated well. Oxygen saturation 97 on 2 liters nasal cannula.

## 2023-08-28 NOTE — PROGRESS NOTES
Patient admitted to CVU from [address] [place] [address] and attached to ventilator and monitors. Report received from anesthesiologist.  Chest x-ray ordered. Labs drawn and sent. Assessment complete. Hemodymanics stable and will continue to monitor. Family let back to see patient. Visiting hours reviewed and all questions answered. Family aware of discharge class.      [address]

## 2023-08-28 NOTE — OP NOTE
Operative Note      Patient: Jovita Neal  YOB: 1950  MRN: [id number]    Date of Procedure: 8/28/2023    Pre-Op Diagnosis Codes:     * Coronary artery disease, unspecified vessel or lesion type, unspecified whether angina present, unspecified whether native or transplanted heart [I25.10]    Post-Op Diagnosis: Same       Procedure(s):  CORONARY ARTERY BYPASS GRAFTING TIMES THREE USING INTERNAL MAMMARY ARTERY AND RIGHT SAPHNOUS VEIN VIA ENDOSCOPIC HARVEST WITH LEFT ATRIAL APPENDAGE CLIP PLACEMENT, PLACEMENT TEMPORARY PACING WIRES, ON PUMP, GINA, BILATERAL PECTORALIS BLOCKS    Surgeon(s):  Moustapha Alarcon MD    Assistant:   Surgical Assistant: Niels Ford  Physician Assistant: CHRIS Higgins    Anesthesia: General    Estimated Blood Loss (mL): 438    Complications: None    Specimens:   ID Type Source Tests Collected by Time Destination   A : XIPHOID Tissue Tissue SURGICAL PATHOLOGY Moustapha Alarcon MD 8/28/2023 3161        Implants:  Implant Name Type Inv. Item Serial No.  Lot No. LRB No. Used Action   CLIP MED SUTURE LESS 40 MM SYS 1 HND STRL ATRICLIP FLX V - [id number]  CLIP MED SUTURE LESS 40 MM SYS 1 HND STRL ATRICLIP FLX V  [employer]-[id number] N/A 1 Implanted         Drains:   Chest Tube Right Pleural 1 (Active)       Chest Tube Anterior Mediastinal 2 (Active)       Chest Tube Left Pleural 3 (Active)       Urinary Catheter 08/28/23 2 Way; [place]-Temperature (Active)       Findings: Ef 40 - 62 post no WMA. Index 3.6. doppler flow after protamine. No residual IGLESIA flow        Detailed Description of Procedure:   BYPASS GRAFTS:  1. LIMA to LAD  2. SVG to OM1  3.  SVG to PDA      Crossclamp Time: 58 min  Cardiopulmonary Bypass Time: 69 min    Vein Start time: 819  Vein out time: 935  Vein ready time: 942    Lowest Septal Temperature: 13    Detailed Description of Procedure:   After informed consent was obtained the patient was brought to the operating room and general anesthesia was

## 2023-08-28 NOTE — CARE COORDINATION
PD # 6  CABG today 8/28. IPTA. Will follow post op course and wait for PT/OT notes. Hoped to return home with wife and [address] [id number] [address]. Following.     Janeth Dennis RN

## 2023-08-28 NOTE — CONSENT
Informed Consent for Blood Component Transfusion Note    I have discussed with the patient the rationale for blood component transfusion; its benefits in treating or preventing fatigue, organ damage, or death; and its risk which includes mild transfusion reactions, rare risk of blood borne infection, or more serious but rare reactions. I have discussed the alternatives to transfusion, including the risk and consequences of not receiving transfusion. The patient had an opportunity to ask questions and had agreed to proceed with transfusion of blood components.     Electronically signed by Tami Ha MD on 8/28/23 at 11:27 AM EDT

## 2023-08-28 NOTE — ANESTHESIA PROCEDURE NOTES
Peripheral Block    Patient location during procedure: OR  Reason for block: post-op pain management and at surgeon's request  Start time: 8/28/2023 7:25 AM  End time: 8/28/2023 7:29 AM  Staffing  Performed: anesthesiologist   Anesthesiologist: Floyd Smith MD  Preanesthetic Checklist  Completed: patient identified, IV checked, site marked, risks and benefits discussed, surgical/procedural consents, equipment checked, pre-op evaluation, timeout performed, anesthesia consent given, oxygen available, monitors applied/VS acknowledged, fire risk safety assessment completed and verbalized and blood product R/B/A discussed and consented  Peripheral Block   Patient position: supine  Prep: ChloraPrep  Provider prep: mask, sterile gloves and sterile gown  Patient monitoring: cardiac monitor, continuous pulse ox, continuous capnometry, frequent blood pressure checks, IV access and oxygen  Block type: PECS I and PECS II  Laterality: bilateral  Injection technique: single-shot  Guidance: ultrasound guided    Needle   Needle type: insulated echogenic nerve stimulator needle   Needle gauge: 22 G  Needle localization: ultrasound guidance  Needle length: 10 cm  Assessment   Injection assessment: negative aspiration for heme and no intravascular symptoms  Paresthesia pain: none  Slow fractionated injection: yes  Hemodynamics: stable  Real-time US image taken/store: yes  Outcomes: uncomplicated    Medications Administered  bupivacaine liposome (EXPAREL) injection 1.3% - Perineural   20 mL - 8/28/2023 7:25:00 AM  bupivacaine (MARCAINE) PF injection 0.25% - Perineural   20 mL - 8/28/2023 7:25:00 AM

## 2023-08-29 ENCOUNTER — APPOINTMENT (OUTPATIENT)
Dept: GENERAL RADIOLOGY | Age: 73
End: 2023-08-29
Payer: MEDICARE

## 2023-08-29 LAB
ANION GAP SERPL CALCULATED.3IONS-SCNC: 10 MMOL/L (ref 3–16)
BASE EXCESS BLDA CALC-SCNC: -5 MMOL/L (ref -3–3)
BUN SERPL-MCNC: 19 MG/DL (ref 7–20)
CA-I BLD-SCNC: 1.17 MMOL/L (ref 1.12–1.32)
CA-I BLD-SCNC: 1.24 MMOL/L (ref 1.12–1.32)
CALCIUM SERPL-MCNC: 8.9 MG/DL (ref 8.3–10.6)
CHLORIDE SERPL-SCNC: 109 MMOL/L (ref 99–110)
CO2 BLDA-SCNC: 24 MMOL/L
CO2 SERPL-SCNC: 23 MMOL/L (ref 21–32)
CREAT SERPL-MCNC: 1.5 MG/DL (ref 0.8–1.3)
DEPRECATED RDW RBC AUTO: 17.9 % (ref 12.4–15.4)
GFR SERPLBLD CREATININE-BSD FMLA CKD-EPI: 48 ML/MIN/{1.73_M2}
GLUCOSE BLD-MCNC: 109 MG/DL (ref 70–99)
GLUCOSE BLD-MCNC: 111 MG/DL (ref 70–99)
GLUCOSE BLD-MCNC: 126 MG/DL (ref 70–99)
GLUCOSE BLD-MCNC: 127 MG/DL (ref 70–99)
GLUCOSE BLD-MCNC: 138 MG/DL (ref 70–99)
GLUCOSE BLD-MCNC: 139 MG/DL (ref 70–99)
GLUCOSE BLD-MCNC: 148 MG/DL (ref 70–99)
GLUCOSE BLD-MCNC: 149 MG/DL (ref 70–99)
GLUCOSE BLD-MCNC: 170 MG/DL (ref 70–99)
GLUCOSE BLD-MCNC: 223 MG/DL (ref 70–99)
GLUCOSE BLD-MCNC: 90 MG/DL (ref 70–99)
GLUCOSE SERPL-MCNC: 143 MG/DL (ref 70–99)
HCO3 BLDA-SCNC: 22.4 MMOL/L (ref 21–29)
HCT VFR BLD AUTO: 30 % (ref 40.5–52.5)
HCT VFR BLD AUTO: 31.4 % (ref 40.5–52.5)
HGB BLD CALC-MCNC: 10.3 GM/DL (ref 13.5–17.5)
HGB BLD-MCNC: 10.1 G/DL (ref 13.5–17.5)
INR PPP: 1.34 (ref 0.84–1.16)
LACTATE BLD-SCNC: 1.36 MMOL/L (ref 0.4–2)
MAGNESIUM SERPL-MCNC: 2.2 MG/DL (ref 1.8–2.4)
MCH RBC QN AUTO: 26.4 PG (ref 26–34)
MCHC RBC AUTO-ENTMCNC: 32.2 G/DL (ref 31–36)
MCV RBC AUTO: 82 FL (ref 80–100)
PCO2 BLDA: 48.8 MM HG (ref 35–45)
PERFORMED ON: ABNORMAL
PERFORMED ON: NORMAL
PH BLDA: 7.27 [PH] (ref 7.35–7.45)
PH BLDV: 7.28 [PH] (ref 7.35–7.45)
PLATELET # BLD AUTO: 110 K/UL (ref 135–450)
PMV BLD AUTO: 8.6 FL (ref 5–10.5)
PO2 BLDA: 71 MM HG (ref 75–108)
POC SAMPLE TYPE: ABNORMAL
POTASSIUM BLD-SCNC: 4.3 MMOL/L (ref 3.5–5.1)
POTASSIUM SERPL-SCNC: 5 MMOL/L (ref 3.5–5.1)
PROTHROMBIN TIME: 16.5 SEC (ref 11.5–14.8)
RBC # BLD AUTO: 3.83 M/UL (ref 4.2–5.9)
SAO2 % BLDA: 91 % (ref 93–100)
SODIUM BLD-SCNC: 143 MMOL/L (ref 136–145)
SODIUM SERPL-SCNC: 142 MMOL/L (ref 136–145)
WBC # BLD AUTO: 17.8 K/UL (ref 4–11)

## 2023-08-29 PROCEDURE — 97167 OT EVAL HIGH COMPLEX 60 MIN: CPT

## 2023-08-29 PROCEDURE — 2580000003 HC RX 258: Performed by: THORACIC SURGERY (CARDIOTHORACIC VASCULAR SURGERY)

## 2023-08-29 PROCEDURE — 82947 ASSAY GLUCOSE BLOOD QUANT: CPT

## 2023-08-29 PROCEDURE — 85014 HEMATOCRIT: CPT

## 2023-08-29 PROCEDURE — 80048 BASIC METABOLIC PNL TOTAL CA: CPT

## 2023-08-29 PROCEDURE — 2140000000 HC CCU INTERMEDIATE R&B

## 2023-08-29 PROCEDURE — 2700000000 HC OXYGEN THERAPY PER DAY

## 2023-08-29 PROCEDURE — 71045 X-RAY EXAM CHEST 1 VIEW: CPT

## 2023-08-29 PROCEDURE — 94669 MECHANICAL CHEST WALL OSCILL: CPT

## 2023-08-29 PROCEDURE — 83605 ASSAY OF LACTIC ACID: CPT

## 2023-08-29 PROCEDURE — 97530 THERAPEUTIC ACTIVITIES: CPT

## 2023-08-29 PROCEDURE — 85027 COMPLETE CBC AUTOMATED: CPT

## 2023-08-29 PROCEDURE — 6360000002 HC RX W HCPCS: Performed by: INTERNAL MEDICINE

## 2023-08-29 PROCEDURE — 94761 N-INVAS EAR/PLS OXIMETRY MLT: CPT

## 2023-08-29 PROCEDURE — 6370000000 HC RX 637 (ALT 250 FOR IP): Performed by: THORACIC SURGERY (CARDIOTHORACIC VASCULAR SURGERY)

## 2023-08-29 PROCEDURE — 99024 POSTOP FOLLOW-UP VISIT: CPT

## 2023-08-29 PROCEDURE — 6360000002 HC RX W HCPCS: Performed by: THORACIC SURGERY (CARDIOTHORACIC VASCULAR SURGERY)

## 2023-08-29 PROCEDURE — 94010 BREATHING CAPACITY TEST: CPT | Performed by: INTERNAL MEDICINE

## 2023-08-29 PROCEDURE — 83735 ASSAY OF MAGNESIUM: CPT

## 2023-08-29 PROCEDURE — 85610 PROTHROMBIN TIME: CPT

## 2023-08-29 PROCEDURE — 82330 ASSAY OF CALCIUM: CPT

## 2023-08-29 PROCEDURE — 84132 ASSAY OF SERUM POTASSIUM: CPT

## 2023-08-29 PROCEDURE — 97163 PT EVAL HIGH COMPLEX 45 MIN: CPT

## 2023-08-29 PROCEDURE — 82803 BLOOD GASES ANY COMBINATION: CPT

## 2023-08-29 PROCEDURE — 2580000003 HC RX 258: Performed by: STUDENT IN AN ORGANIZED HEALTH CARE EDUCATION/TRAINING PROGRAM

## 2023-08-29 PROCEDURE — 6370000000 HC RX 637 (ALT 250 FOR IP): Performed by: STUDENT IN AN ORGANIZED HEALTH CARE EDUCATION/TRAINING PROGRAM

## 2023-08-29 PROCEDURE — 84295 ASSAY OF SERUM SODIUM: CPT

## 2023-08-29 PROCEDURE — 94640 AIRWAY INHALATION TREATMENT: CPT

## 2023-08-29 PROCEDURE — 6360000002 HC RX W HCPCS: Performed by: STUDENT IN AN ORGANIZED HEALTH CARE EDUCATION/TRAINING PROGRAM

## 2023-08-29 RX ORDER — METHIMAZOLE 5 MG/1
5 TABLET ORAL DAILY
Status: DISCONTINUED | OUTPATIENT
Start: 2023-08-29 | End: 2023-09-01 | Stop reason: HOSPADM

## 2023-08-29 RX ORDER — LORAZEPAM 2 MG/ML
0.5 INJECTION INTRAMUSCULAR ONCE
Status: DISCONTINUED | OUTPATIENT
Start: 2023-08-29 | End: 2023-08-30

## 2023-08-29 RX ORDER — ALBUTEROL SULFATE 2.5 MG/3ML
2.5 SOLUTION RESPIRATORY (INHALATION) EVERY 4 HOURS PRN
Status: DISCONTINUED | OUTPATIENT
Start: 2023-08-29 | End: 2023-09-01 | Stop reason: HOSPADM

## 2023-08-29 RX ADMIN — Medication 2000 MG: at 17:31

## 2023-08-29 RX ADMIN — Medication 400 MG: at 20:56

## 2023-08-29 RX ADMIN — DEXTROSE AND SODIUM CHLORIDE: 5; 450 INJECTION, SOLUTION INTRAVENOUS at 05:16

## 2023-08-29 RX ADMIN — VASOPRESSIN 0.04 UNITS/MIN: 20 INJECTION INTRAVENOUS at 05:08

## 2023-08-29 RX ADMIN — FUROSEMIDE 20 MG: 10 INJECTION, SOLUTION INTRAMUSCULAR; INTRAVENOUS at 09:26

## 2023-08-29 RX ADMIN — METHOCARBAMOL 750 MG: 750 TABLET ORAL at 13:25

## 2023-08-29 RX ADMIN — SERTRALINE 100 MG: 50 TABLET, FILM COATED ORAL at 13:24

## 2023-08-29 RX ADMIN — BISACODYL 5 MG: 5 TABLET, COATED ORAL at 09:13

## 2023-08-29 RX ADMIN — OXYCODONE HYDROCHLORIDE 5 MG: 5 TABLET ORAL at 09:16

## 2023-08-29 RX ADMIN — MORPHINE SULFATE 2 MG: 2 INJECTION, SOLUTION INTRAMUSCULAR; INTRAVENOUS at 13:32

## 2023-08-29 RX ADMIN — CHLORHEXIDINE GLUCONATE 15 ML: 1.2 RINSE ORAL at 09:13

## 2023-08-29 RX ADMIN — ASPIRIN 81 MG: 81 TABLET, COATED ORAL at 09:13

## 2023-08-29 RX ADMIN — MUPIROCIN: 20 OINTMENT TOPICAL at 20:57

## 2023-08-29 RX ADMIN — Medication 10 ML: at 21:04

## 2023-08-29 RX ADMIN — MUPIROCIN: 20 OINTMENT TOPICAL at 09:18

## 2023-08-29 RX ADMIN — ACETAMINOPHEN 650 MG: 325 TABLET ORAL at 13:24

## 2023-08-29 RX ADMIN — METHOCARBAMOL 750 MG: 750 TABLET ORAL at 21:00

## 2023-08-29 RX ADMIN — ACETAMINOPHEN 650 MG: 325 TABLET ORAL at 17:32

## 2023-08-29 RX ADMIN — METHOCARBAMOL 750 MG: 750 TABLET ORAL at 09:00

## 2023-08-29 RX ADMIN — TIOTROPIUM BROMIDE INHALATION SPRAY 2 PUFF: 3.12 SPRAY, METERED RESPIRATORY (INHALATION) at 11:09

## 2023-08-29 RX ADMIN — ACETAMINOPHEN 650 MG: 325 TABLET ORAL at 05:12

## 2023-08-29 RX ADMIN — Medication 2 PUFF: at 11:09

## 2023-08-29 RX ADMIN — POTASSIUM CHLORIDE 20 MEQ: 750 TABLET, EXTENDED RELEASE ORAL at 20:56

## 2023-08-29 RX ADMIN — CLOPIDOGREL BISULFATE 75 MG: 75 TABLET ORAL at 09:15

## 2023-08-29 RX ADMIN — FUROSEMIDE 20 MG: 10 INJECTION, SOLUTION INTRAMUSCULAR; INTRAVENOUS at 12:16

## 2023-08-29 RX ADMIN — POTASSIUM CHLORIDE 20 MEQ: 29.8 INJECTION, SOLUTION INTRAVENOUS at 01:21

## 2023-08-29 RX ADMIN — OXYCODONE HYDROCHLORIDE 5 MG: 5 TABLET ORAL at 22:04

## 2023-08-29 RX ADMIN — POLYETHYLENE GLYCOL 3350 17 G: 17 POWDER, FOR SOLUTION ORAL at 09:14

## 2023-08-29 RX ADMIN — Medication 2 PUFF: at 19:01

## 2023-08-29 RX ADMIN — ALBUTEROL SULFATE 2.5 MG: 2.5 SOLUTION RESPIRATORY (INHALATION) at 15:25

## 2023-08-29 RX ADMIN — MORPHINE SULFATE 4 MG: 4 INJECTION, SOLUTION INTRAMUSCULAR; INTRAVENOUS at 23:04

## 2023-08-29 RX ADMIN — Medication 10 ML: at 13:35

## 2023-08-29 RX ADMIN — VANCOMYCIN HYDROCHLORIDE 1250 MG: 10 INJECTION, POWDER, LYOPHILIZED, FOR SOLUTION INTRAVENOUS at 21:02

## 2023-08-29 RX ADMIN — Medication 400 MG: at 09:14

## 2023-08-29 RX ADMIN — OXYCODONE HYDROCHLORIDE 5 MG: 5 TABLET ORAL at 17:32

## 2023-08-29 RX ADMIN — FAMOTIDINE 20 MG: 20 TABLET ORAL at 09:14

## 2023-08-29 RX ADMIN — ATORVASTATIN CALCIUM 40 MG: 40 TABLET, FILM COATED ORAL at 20:57

## 2023-08-29 RX ADMIN — MORPHINE SULFATE 4 MG: 4 INJECTION, SOLUTION INTRAMUSCULAR; INTRAVENOUS at 03:44

## 2023-08-29 RX ADMIN — METHIMAZOLE 5 MG: 5 TABLET ORAL at 09:16

## 2023-08-29 RX ADMIN — CHLORHEXIDINE GLUCONATE 15 ML: 1.2 RINSE ORAL at 20:57

## 2023-08-29 RX ADMIN — Medication 2000 MG: at 09:33

## 2023-08-29 RX ADMIN — OXYCODONE HYDROCHLORIDE 5 MG: 5 TABLET ORAL at 05:12

## 2023-08-29 RX ADMIN — FUROSEMIDE 20 MG: 10 INJECTION, SOLUTION INTRAMUSCULAR; INTRAVENOUS at 18:01

## 2023-08-29 RX ADMIN — ACETAMINOPHEN 650 MG: 325 TABLET ORAL at 23:13

## 2023-08-29 RX ADMIN — FONDAPARINUX SODIUM 2.5 MG: 2.5 INJECTION, SOLUTION SUBCUTANEOUS at 09:13

## 2023-08-29 RX ADMIN — FUROSEMIDE 20 MG: 10 INJECTION, SOLUTION INTRAMUSCULAR; INTRAVENOUS at 21:01

## 2023-08-29 ASSESSMENT — PAIN SCALES - GENERAL
PAINLEVEL_OUTOF10: 9
PAINLEVEL_OUTOF10: 8
PAINLEVEL_OUTOF10: 7

## 2023-08-29 ASSESSMENT — PAIN DESCRIPTION - LOCATION
LOCATION: CHEST

## 2023-08-29 ASSESSMENT — PAIN DESCRIPTION - ORIENTATION
ORIENTATION: LEFT
ORIENTATION: LEFT

## 2023-08-29 ASSESSMENT — PAIN DESCRIPTION - DESCRIPTORS
DESCRIPTORS: SORE
DESCRIPTORS: SORE

## 2023-08-29 ASSESSMENT — PAIN SCALES - WONG BAKER
WONGBAKER_NUMERICALRESPONSE: 4;2
WONGBAKER_NUMERICALRESPONSE: 4

## 2023-08-29 NOTE — PROGRESS NOTES
Texted Dr. Chandler Tse about patient's BP and EPOC results. His BP has been low since beginning of shift. I had him on 9 of Levo and BP was still low. EPOC results showed mild acidosis. Vaso ordered and one amp of bicarb. Patient is having a lot of pain in left side of his chest. Morphine being given every two hours and Dr. Chandler Tse started Robaxin earlier.    Electronically signed by Alejandro Olea RN on 8/28/2023 at 11:06 PM

## 2023-08-29 NOTE — PROGRESS NOTES
Cane  Has the patient had two or more falls in the past year or any fall with injury in the past year?: No  Receives Help From: Family (wife able to provide 24/7)  ADL Assistance: Independent  Homemaking Assistance: Independent  Homemaking Responsibilities: Yes  Bill Paying/Finance Responsibility: Primary  Ambulation Assistance: Independent  Transfer Assistance: Independent  Active : Yes  Occupation: Retired  Type of Occupation: repaired machinery  Leisure & Hobbies: work in [employer], [address]  [address]: Impaired  Vision Exceptions: Wears glasses for reading  Hearing  Hearing: Within functional limits    Cognition   Orientation  Overall Orientation Status: Within Normal Limits  Orientation Level: Oriented X4  Cognition  Overall Cognitive Status: WFL     Objective   Pulse: 74  BP: (!) 108/43  MAP (Calculated): 65  Respirations: 24  SpO2: 97 %  O2 Device: Nasal cannula  Comment: 2L     Observation/Palpation  Posture: Fair        AROM RLE (degrees)  RLE AROM: WFL  AROM LLE (degrees)  LLE AROM : WFL  Strength RLE  Strength RLE: WFL  Comment: appears about 3/5 with mobility  Strength LLE  Strength LLE: WFL  Comment: appears about 3/5 with mobility        Bed Mobility Training  Bed Mobility Training: Yes  Overall Level of Assistance: Moderate assistance;Assist X2  Interventions: Weight shifting training/pressure relief;Verbal cues;Manual cues; Safety awareness training  Supine to Sit: Moderate assistance;Assist X2  Scooting: Maximum assistance;Assist X2  Transfer Training  Transfer Training: Yes  Overall Level of Assistance: Minimum assistance;Assist X2  Interventions: Verbal cues; Safety awareness training  Sit to Stand: Minimum assistance  Stand to Sit: Minimum assistance  Stand Pivot Transfers: Minimum assistance;Assist X2  Bed to Chair: Minimum assistance;Assist X2                    AM-PAC Score  AM-PAC Inpatient Mobility Raw Score : 7 (08/29/23 1218)  -PAC Inpatient T-Scale Score : 26.42 (08/29/23 1218)  Mobility Inpatient CMS 0-100% Score: 92.36 (08/29/23 1218)  Mobility Inpatient CMS G-Code Modifier : CM (08/29/23 1218)          Goals  Short Term Goals  Time Frame for Short Term Goals: 9/5/23  Short Term Goal 1: Pt will complete supine>sit with min A. Short Term Goal 2: Pt will complete STS with SBA  Short Term Goal 3: Pt will ambulate 150 ft with SBA and LRAD. Short Term Goal 4: Pt will tolerate 12-15 reps of LE exercises to progress endurance by 9/2. Short Term Goal 5: Pt will climb 2 stairs with handrails prn and SBA. Patient Goals   Patient Goals : \"Get up to the [facility] [facility]"       Education  Patient Education  Education Given To: Patient  Education Provided: Role of Therapy;Plan of Care;Orientation;Precautions;Transfer Training;Energy Conservation  Education Provided Comments: role of PT, sternal precautions, transfer technique, vitals response with activity  Education Method: Verbal  Barriers to Learning: None  Education Outcome: Verbalized understanding      Therapy Time   Individual Concurrent Group Co-treatment   Time In 0818         Time Out 0906         Minutes 48         Timed Code Treatment Minutes: 33 Minutes (15 min eval)       Morelia Stevens PT     If pt is unable to be seen after this session, please let this note serve as discharge summary. Please see case management note for discharge disposition. Thank you.

## 2023-08-29 NOTE — CARE COORDINATION
Writer met with pt and wife bedside, explained PT/OT recs are continue to assess. Pt prefers to go home, wife verbalized previously told pt may need acute rehab and would want [facility] ARU if appropriate. Writer explained pt will need PT/OT recs for IPR and insurance precert. Pt/wife agreed to take it one day at a time and see how pt does with PT/OT tomorrow. Writer gave referral to [facility].. CM will follow for DME need, pt currently on 2L/NC.   CHAVA Booth-RN

## 2023-08-29 NOTE — PROGRESS NOTES
-Serial renal panel  -daily wts and strict i/o  -renal dose medications   -avoid nephrotoxins      Arthurine Yong GOODSON  Office: [phone]  Fax:    [place]

## 2023-08-29 NOTE — PROGRESS NOTES
Occupational Therapy  Facility/Department: [facility] C2 CARD TELEMETRY  Occupational Therapy Initial Assessment/Treatment    Name: Jovita Neal  : 1950  MRN: [id number]  Date of Service: 2023    Discharge Recommendations:  Continue to assess pending progress  OT Equipment Recommendations  Equipment Needed: Yes  Mobility Devices: ADL Assistive Devices  ADL Assistive Devices: Transfer Tub Bench       Patient Diagnosis(es): The primary encounter diagnosis was Chest pain, unspecified type. Diagnoses of Coronary artery disease involving native coronary artery of native heart without angina pectoris and Coronary artery disease, unspecified vessel or lesion type, unspecified whether angina present, unspecified whether native or transplanted heart were also pertinent to this visit. Past Medical History:  has a past medical history of CAD (coronary artery disease), Cancer ([address]), COPD (chronic obstructive pulmonary disease) ([address]), Depression, Heart attack ([address]), History of renal calculi, Hyperlipidemia, Hypertension, Hyperthyroidism, and RBBB. Past Surgical History:  has a past surgical history that includes Cardiac surgery (); Colonoscopy (14); Cystoscopy; Cystoscopy (Left, 10/22/2021); and Coronary artery bypass graft (N/A, 2023). Assessment   Performance deficits / Impairments: Decreased functional mobility ; Decreased strength;Decreased endurance;Decreased ADL status; Decreased safe awareness;Decreased balance  Assessment: Pt pleasant and agreeable male seen for OT eval s/p CABG x 3. PTA pt IND with ADLs and wife completing most IADLs. Currently, pt mod A x 2 for supine > sit, max A x 2 for scooting to EoB, min A x 1 for STS, min A x 2 for SPT EoB > recliner, JUAN facewashing, dep donning B socks. Pt educated on sternal precautions and PLB, verbalized understanding and demo'ed understanding of PLB.  After evaluation, pt found to be presenting with the above mentioned occupational performance

## 2023-08-29 NOTE — CONSULTS
Requirements: Ideal (70 kg)  Energy (kcal/day): 5370-3267  Weight Used for Protein Requirements: Ideal (1-1.2 g/kg)  Protein (g/day): 70-84 g  Method Used for Fluid Requirements: 1 ml/kcal  Fluid (ml/day):      Nutrition Diagnosis:   Inadequate oral intake related to inadequate protein-energy intake as evidenced by intake 0-25%, other (comment) (S/p CABG)    Nutrition Interventions:   Food and/or Nutrient Delivery: Continue Current Diet, Start Oral Nutrition Supplement  Nutrition Education/Counseling: Education not appropriate  Coordination of Nutrition Care: Continue to monitor while inpatient       Goals:     Goals: PO intake 50% or greater, prior to discharge       Nutrition Monitoring and Evaluation:   Behavioral-Environmental Outcomes: None Identified  Food/Nutrient Intake Outcomes: Food and Nutrient Intake, Supplement Intake  Physical Signs/Symptoms Outcomes: Weight, Nutrition Focused Physical Findings, Biochemical Data, Constipation    Discharge Planning:    Continue current diet, Continue Oral Nutrition Supplement     Chelo Ward MS, [address] [facility]  Contact: Office: [phone]; Ingrid Sanchez: [phone]

## 2023-08-29 NOTE — PROGRESS NOTES
Chest tubes meet criteria to remove per open heart protocol. No  air leak. no crepitus. Pt instructed on procedure. Pt premedicated with 2mg Morphine. Site cleansed and prepped per protocol. MS chest tube removed without difficulty. Dry sterile dressing applied. Bilateral breath sounds audible. O2 Sats 92 on 2 nasal cannula. Incision site within normal limits. Patient tolerated well.

## 2023-08-29 NOTE — PROGRESS NOTES
CVTS Cardiothoracic Progress Note:                                CC:  Post op follow up     Surgery: 8/28 CORONARY ARTERY BYPASS GRAFTING TIMES THREE USING INTERNAL MAMMARY ARTERY AND RIGHT SAPHNOUS VEIN VIA ENDOSCOPIC HARVEST WITH LEFT ATRIAL APPENDAGE CLIP PLACEMENT, PLACEMENT TEMPORARY PACING WIRES, ON PUMP, GINA, BILATERAL PECTORALIS BLOCKS     BYPASS GRAFTS:  1. LIMA to LAD  2. SVG to OM1  3. SVG to [address] course:  8/28 DOS- Extubated @ 1450 to 3L NC. Vaso added. 1L albumin total given. A&O. Following commands. Robaxin added for continued c/o pain    8/29 POD 1- TPW removed this AM. Plan to remove MS chest tube. Holding BB 2/2 low normal SBP. Start diuresis as tolerated. Flowtrack removed.        Past Medical History:   Diagnosis Date    CAD (coronary artery disease)     Cancer ([address])     BLADDER    COPD (chronic obstructive pulmonary disease) ([facility])     Depression     ADJUSTMENT DISORDER    Heart attack ([address]) 10/10    History of renal calculi     Hyperlipidemia     Hypertension     Hyperthyroidism     RBBB         Past Surgical History:   Procedure Laterality Date    CARDIAC SURGERY  2010    2 stents    COLONOSCOPY  5/2/14    colonoscopy and egd    CORONARY ARTERY BYPASS GRAFT N/A 8/28/2023    CORONARY ARTERY BYPASS GRAFTING TIMES THREE USING INTERNAL MAMMARY ARTERY AND RIGHT SAPHNOUS VEIN VIA ENDOSCOPIC HARVEST WITH LEFT ATRIAL APPENDAGE CLIP PLACEMENT, PLACEMENT TEMPORARY PACING WIRES, ON PUMP, GINA, BILATERAL PECTORALIS BLOCKS performed by Nadine Hopper MD at [address] [place] [address] 10/22/2021    CYSTOSCOPY, TRANSURETHRAL RESECTION OF BLADDER TUMOR, LEFT STENT REMOVAL performed by Peterson Baltazar MD at [address] [place] [address] as of 08/22/2023 - Fully Reviewed 08/22/2023   Allergen Reaction Noted    Penicillins Other (See Comments) 12/05/2009        Patient Active Problem List   Diagnosis    Hyperlipidemia    Depression    Hypertension    CAD (coronary artery disease)    History

## 2023-08-29 NOTE — PROGRESS NOTES
Physician Progress Note      Linn Gilbert  CSN #:                  [id number]  :                       1950  ADMIT DATE:       2023 2:33 AM  DISCH DATE:  RESPONDING  PROVIDER #:        Robin Amaya          QUERY TEXT:    Pt admitted with NSTEMI. Pt noted to have severe in-stent restenosis with 90%   stenosis in the proximal-mid segment followed by 95 and then 99 to 100%  in   the distal segment as well as multivessel CAD. If possible, please document   in progress notes and discharge summary the culprit lesion of cause for   NSTEMI. The medical record reflects the following:  Risk Factors: CAD, s/p stent to RCA  Clinical Indicators:  EKG -  Atrial flutter with variable AV block with premature and consecutive   ventricular or aberrantly conducted  complexes  Non-specific intra-ventricular conduction block  Possible Inferior infarct (cited on or before 19-AUG-2023)  Right axis deviation\"  Cath report -  \"CORONARY ARTERIES    LM Heavy calcification is noted, there is ventricularization of pressures with   5 [place] diagnostic catheter, short vessel, appears to be 40% diffuse   stenosis in the lxezovgh-bvv-fnmisa segment. LAD Heavily calcified, there is ostial/proximal eccentric 50 to 60% stenosis. There is also a proximal-mid eccentric 60 to 70% stenosis and mid-distal 80%   stenosis. Vessel is heavily calcified throughout. D1 has 60 to 70% proximal-mid stenosis. There are well-developed left-to-right collaterals. LCX Calcified, proximal 40 to 50% stenosis, there is mid stenosis that extends   into the ostium and proximal segment of a large OM 3 which is the largest OM   and that has a 90% stenosis. RCA Dominant, calcified, there are proximal-mid stents, there is severe   in-stent restenosis with 90% stenosis in the proximal-mid segment followed by   95 and then 99 to 100%  in the distal segment.   There is faint antegrade   filling and right to right collaterals and there are better developed   left-to-right collaterals noted to PDA and PLV territory. \"    CTS 08/23-  \"  His EKG in ED showed acute anterior lateral ST depression. \"    Treatment: LHC, heparin gtt, nitro gtt, CTS consult, pending CABG, serial   labs, and supportive care,    Thank you,  Iraida Watson, RN, BSN, CRCR  Options provided:  -- NSTEMI due to in stent restenosis  -- NSTEMI due to progression of CAD and in stent restenosis  -- NSTEMI due to progression of CAD  -- Other - I will add my own diagnosis  -- Disagree - Not applicable / Not valid  -- Disagree - Clinically unable to determine / Unknown  -- Refer to Clinical Documentation Reviewer    PROVIDER RESPONSE TEXT:    This patient has NSTEMI to due progression of CAD and in stent restenosis.     Query created by: Iraida Watson on 8/25/2023 3:25 PM      Electronically signed by:  Allan Roldan 8/29/2023 2:42 PM

## 2023-08-30 ENCOUNTER — APPOINTMENT (OUTPATIENT)
Dept: GENERAL RADIOLOGY | Age: 73
End: 2023-08-30
Payer: MEDICARE

## 2023-08-30 LAB
ANION GAP SERPL CALCULATED.3IONS-SCNC: 12 MMOL/L (ref 3–16)
BLOOD BANK DISPENSE STATUS: NORMAL
BLOOD BANK DISPENSE STATUS: NORMAL
BLOOD BANK PRODUCT CODE: NORMAL
BLOOD BANK PRODUCT CODE: NORMAL
BPU ID: NORMAL
BPU ID: NORMAL
BUN SERPL-MCNC: 26 MG/DL (ref 7–20)
CALCIUM SERPL-MCNC: 8.7 MG/DL (ref 8.3–10.6)
CHLORIDE SERPL-SCNC: 105 MMOL/L (ref 99–110)
CO2 SERPL-SCNC: 23 MMOL/L (ref 21–32)
CREAT SERPL-MCNC: 1.8 MG/DL (ref 0.8–1.3)
DEPRECATED RDW RBC AUTO: 18.3 % (ref 12.4–15.4)
DESCRIPTION BLOOD BANK: NORMAL
DESCRIPTION BLOOD BANK: NORMAL
GFR SERPLBLD CREATININE-BSD FMLA CKD-EPI: 39 ML/MIN/{1.73_M2}
GLUCOSE BLD-MCNC: 107 MG/DL (ref 70–99)
GLUCOSE BLD-MCNC: 110 MG/DL (ref 70–99)
GLUCOSE BLD-MCNC: 113 MG/DL (ref 70–99)
GLUCOSE BLD-MCNC: 123 MG/DL (ref 70–99)
GLUCOSE BLD-MCNC: 125 MG/DL (ref 70–99)
GLUCOSE BLD-MCNC: 130 MG/DL (ref 70–99)
GLUCOSE BLD-MCNC: 147 MG/DL (ref 70–99)
GLUCOSE BLD-MCNC: 164 MG/DL (ref 70–99)
GLUCOSE BLD-MCNC: 167 MG/DL (ref 70–99)
GLUCOSE BLD-MCNC: 181 MG/DL (ref 70–99)
GLUCOSE BLD-MCNC: 185 MG/DL (ref 70–99)
GLUCOSE BLD-MCNC: 76 MG/DL (ref 70–99)
GLUCOSE BLD-MCNC: 80 MG/DL (ref 70–99)
GLUCOSE BLD-MCNC: 89 MG/DL (ref 70–99)
GLUCOSE BLD-MCNC: 92 MG/DL (ref 70–99)
GLUCOSE SERPL-MCNC: 83 MG/DL (ref 70–99)
HCT VFR BLD AUTO: 30 % (ref 40.5–52.5)
HGB BLD-MCNC: 9.8 G/DL (ref 13.5–17.5)
INR PPP: 1.49 (ref 0.84–1.16)
MAGNESIUM SERPL-MCNC: 2.2 MG/DL (ref 1.8–2.4)
MCH RBC QN AUTO: 26.5 PG (ref 26–34)
MCHC RBC AUTO-ENTMCNC: 32.6 G/DL (ref 31–36)
MCV RBC AUTO: 81.3 FL (ref 80–100)
PERFORMED ON: ABNORMAL
PERFORMED ON: NORMAL
PLATELET # BLD AUTO: 94 K/UL (ref 135–450)
PLATELET BLD QL SMEAR: ABNORMAL
PMV BLD AUTO: 8.8 FL (ref 5–10.5)
POTASSIUM SERPL-SCNC: 3.7 MMOL/L (ref 3.5–5.1)
PROTHROMBIN TIME: 18 SEC (ref 11.5–14.8)
RBC # BLD AUTO: 3.69 M/UL (ref 4.2–5.9)
SLIDE REVIEW: ABNORMAL
SODIUM SERPL-SCNC: 140 MMOL/L (ref 136–145)
WBC # BLD AUTO: 19.3 K/UL (ref 4–11)

## 2023-08-30 PROCEDURE — 85027 COMPLETE CBC AUTOMATED: CPT

## 2023-08-30 PROCEDURE — 97530 THERAPEUTIC ACTIVITIES: CPT

## 2023-08-30 PROCEDURE — 6360000002 HC RX W HCPCS

## 2023-08-30 PROCEDURE — 6370000000 HC RX 637 (ALT 250 FOR IP): Performed by: THORACIC SURGERY (CARDIOTHORACIC VASCULAR SURGERY)

## 2023-08-30 PROCEDURE — 80048 BASIC METABOLIC PNL TOTAL CA: CPT

## 2023-08-30 PROCEDURE — 6360000002 HC RX W HCPCS: Performed by: INTERNAL MEDICINE

## 2023-08-30 PROCEDURE — 99024 POSTOP FOLLOW-UP VISIT: CPT

## 2023-08-30 PROCEDURE — 94640 AIRWAY INHALATION TREATMENT: CPT

## 2023-08-30 PROCEDURE — 6370000000 HC RX 637 (ALT 250 FOR IP): Performed by: STUDENT IN AN ORGANIZED HEALTH CARE EDUCATION/TRAINING PROGRAM

## 2023-08-30 PROCEDURE — 71045 X-RAY EXAM CHEST 1 VIEW: CPT

## 2023-08-30 PROCEDURE — 83735 ASSAY OF MAGNESIUM: CPT

## 2023-08-30 PROCEDURE — 2700000000 HC OXYGEN THERAPY PER DAY

## 2023-08-30 PROCEDURE — 94669 MECHANICAL CHEST WALL OSCILL: CPT

## 2023-08-30 PROCEDURE — 2580000003 HC RX 258: Performed by: STUDENT IN AN ORGANIZED HEALTH CARE EDUCATION/TRAINING PROGRAM

## 2023-08-30 PROCEDURE — 2500000003 HC RX 250 WO HCPCS: Performed by: STUDENT IN AN ORGANIZED HEALTH CARE EDUCATION/TRAINING PROGRAM

## 2023-08-30 PROCEDURE — 85610 PROTHROMBIN TIME: CPT

## 2023-08-30 PROCEDURE — 94761 N-INVAS EAR/PLS OXIMETRY MLT: CPT

## 2023-08-30 PROCEDURE — 97535 SELF CARE MNGMENT TRAINING: CPT

## 2023-08-30 PROCEDURE — 6360000002 HC RX W HCPCS: Performed by: STUDENT IN AN ORGANIZED HEALTH CARE EDUCATION/TRAINING PROGRAM

## 2023-08-30 PROCEDURE — 2140000000 HC CCU INTERMEDIATE R&B

## 2023-08-30 RX ORDER — FUROSEMIDE 10 MG/ML
20 INJECTION INTRAMUSCULAR; INTRAVENOUS 3 TIMES DAILY
Status: DISCONTINUED | OUTPATIENT
Start: 2023-08-30 | End: 2023-08-31

## 2023-08-30 RX ORDER — HEPARIN SODIUM 5000 [USP'U]/ML
5000 INJECTION, SOLUTION INTRAVENOUS; SUBCUTANEOUS EVERY 8 HOURS SCHEDULED
Status: DISCONTINUED | OUTPATIENT
Start: 2023-08-30 | End: 2023-09-01 | Stop reason: HOSPADM

## 2023-08-30 RX ADMIN — Medication 2 PUFF: at 19:29

## 2023-08-30 RX ADMIN — MUPIROCIN: 20 OINTMENT TOPICAL at 20:53

## 2023-08-30 RX ADMIN — BISACODYL 5 MG: 5 TABLET, COATED ORAL at 08:38

## 2023-08-30 RX ADMIN — ASPIRIN 81 MG: 81 TABLET, COATED ORAL at 08:40

## 2023-08-30 RX ADMIN — SERTRALINE 100 MG: 50 TABLET, FILM COATED ORAL at 08:38

## 2023-08-30 RX ADMIN — INSULIN GLARGINE 12 UNITS: 100 INJECTION, SOLUTION SUBCUTANEOUS at 20:53

## 2023-08-30 RX ADMIN — POTASSIUM CHLORIDE 20 MEQ: 29.8 INJECTION, SOLUTION INTRAVENOUS at 06:59

## 2023-08-30 RX ADMIN — HEPARIN SODIUM 5000 UNITS: 5000 INJECTION INTRAVENOUS; SUBCUTANEOUS at 14:34

## 2023-08-30 RX ADMIN — MORPHINE SULFATE 4 MG: 4 INJECTION, SOLUTION INTRAMUSCULAR; INTRAVENOUS at 01:15

## 2023-08-30 RX ADMIN — OXYCODONE HYDROCHLORIDE 5 MG: 5 TABLET ORAL at 05:34

## 2023-08-30 RX ADMIN — ACETAMINOPHEN 650 MG: 325 TABLET ORAL at 11:53

## 2023-08-30 RX ADMIN — METHOCARBAMOL 750 MG: 750 TABLET ORAL at 14:33

## 2023-08-30 RX ADMIN — TIOTROPIUM BROMIDE INHALATION SPRAY 2 PUFF: 3.12 SPRAY, METERED RESPIRATORY (INHALATION) at 07:24

## 2023-08-30 RX ADMIN — METOPROLOL TARTRATE 12.5 MG: 25 TABLET, FILM COATED ORAL at 20:51

## 2023-08-30 RX ADMIN — POLYETHYLENE GLYCOL 3350 17 G: 17 POWDER, FOR SOLUTION ORAL at 08:38

## 2023-08-30 RX ADMIN — SODIUM CHLORIDE 6 MCG/MIN: 9 INJECTION, SOLUTION INTRAVENOUS at 04:17

## 2023-08-30 RX ADMIN — ATORVASTATIN CALCIUM 40 MG: 40 TABLET, FILM COATED ORAL at 20:52

## 2023-08-30 RX ADMIN — Medication 10 ML: at 20:53

## 2023-08-30 RX ADMIN — POTASSIUM CHLORIDE 20 MEQ: 750 TABLET, EXTENDED RELEASE ORAL at 20:50

## 2023-08-30 RX ADMIN — ACETAMINOPHEN 650 MG: 325 TABLET ORAL at 17:35

## 2023-08-30 RX ADMIN — METHIMAZOLE 5 MG: 5 TABLET ORAL at 11:26

## 2023-08-30 RX ADMIN — METHOCARBAMOL 750 MG: 750 TABLET ORAL at 20:52

## 2023-08-30 RX ADMIN — ACETAMINOPHEN 650 MG: 325 TABLET ORAL at 05:34

## 2023-08-30 RX ADMIN — CLOPIDOGREL BISULFATE 75 MG: 75 TABLET ORAL at 08:40

## 2023-08-30 RX ADMIN — FAMOTIDINE 20 MG: 20 TABLET ORAL at 08:40

## 2023-08-30 RX ADMIN — Medication 2 PUFF: at 07:24

## 2023-08-30 RX ADMIN — Medication 10 ML: at 08:40

## 2023-08-30 RX ADMIN — FUROSEMIDE 20 MG: 10 INJECTION, SOLUTION INTRAMUSCULAR; INTRAVENOUS at 08:41

## 2023-08-30 RX ADMIN — Medication 2000 MG: at 00:11

## 2023-08-30 RX ADMIN — Medication 400 MG: at 08:40

## 2023-08-30 RX ADMIN — MORPHINE SULFATE 2 MG: 2 INJECTION, SOLUTION INTRAMUSCULAR; INTRAVENOUS at 11:53

## 2023-08-30 RX ADMIN — CHLORHEXIDINE GLUCONATE 15 ML: 1.2 RINSE ORAL at 20:53

## 2023-08-30 RX ADMIN — ACETAMINOPHEN 650 MG: 325 TABLET ORAL at 23:57

## 2023-08-30 RX ADMIN — Medication 400 MG: at 20:52

## 2023-08-30 RX ADMIN — HEPARIN SODIUM 5000 UNITS: 5000 INJECTION INTRAVENOUS; SUBCUTANEOUS at 22:53

## 2023-08-30 RX ADMIN — FUROSEMIDE 20 MG: 10 INJECTION, SOLUTION INTRAMUSCULAR; INTRAVENOUS at 14:34

## 2023-08-30 RX ADMIN — METHOCARBAMOL 750 MG: 750 TABLET ORAL at 08:39

## 2023-08-30 RX ADMIN — FUROSEMIDE 20 MG: 10 INJECTION, SOLUTION INTRAMUSCULAR; INTRAVENOUS at 20:53

## 2023-08-30 RX ADMIN — SODIUM CHLORIDE 12.15 UNITS/HR: 9 INJECTION, SOLUTION INTRAVENOUS at 14:03

## 2023-08-30 RX ADMIN — CHLORHEXIDINE GLUCONATE 15 ML: 1.2 RINSE ORAL at 08:40

## 2023-08-30 ASSESSMENT — PAIN DESCRIPTION - ORIENTATION
ORIENTATION: RIGHT
ORIENTATION: RIGHT

## 2023-08-30 ASSESSMENT — PAIN SCALES - GENERAL
PAINLEVEL_OUTOF10: 0
PAINLEVEL_OUTOF10: 4
PAINLEVEL_OUTOF10: 0
PAINLEVEL_OUTOF10: 4
PAINLEVEL_OUTOF10: 6
PAINLEVEL_OUTOF10: 0
PAINLEVEL_OUTOF10: 0

## 2023-08-30 ASSESSMENT — PAIN SCALES - WONG BAKER
WONGBAKER_NUMERICALRESPONSE: 2
WONGBAKER_NUMERICALRESPONSE: 2

## 2023-08-30 ASSESSMENT — PAIN DESCRIPTION - LOCATION
LOCATION: CHEST

## 2023-08-30 NOTE — PROGRESS NOTES
Nephrology Progress Note   [facility] com      Sub/interval history  C/o discomfort  levo at 8 and vaso--> weaned off on 8/30  Cr trended up to 1.5--> 1.8  Received lasix 20 mg iv q6h    Last 24 h uop 2.3 l  Admission wt 186 lbs; 189 lbs on 8/29    ROS: fever+; some chest pain;  No shortness of breath/nausea/vomiting  PSFH: No visitor    Scheduled Meds:   heparin (porcine)  5,000 Units SubCUTAneous 3 times per day    sertraline  100 mg Oral Daily    methIMAzole  5 mg Oral Daily    mometasone-formoterol  2 puff Inhalation BID RT    tiotropium  2 puff Inhalation Daily RT    sodium chloride flush  5-40 mL IntraVENous 2 times per day    aspirin  81 mg Oral Daily    clopidogrel  75 mg Oral Daily    acetaminophen  650 mg Oral Q6H    chlorhexidine  15 mL Mouth/Throat BID    furosemide  20 mg IntraVENous 4x Daily    [START ON 8/31/2023] furosemide  40 mg Oral BID    magnesium oxide  400 mg Oral BID    mupirocin   Each Nostril BID    potassium chloride  20 mEq Oral BID    bisacodyl  5 mg Oral Daily    polyethylene glycol  17 g Oral Daily    metoprolol tartrate  12.5 mg Oral BID    atorvastatin  40 mg Oral Nightly    famotidine  20 mg Oral Daily    Or    famotidine (PEPCID) injection  20 mg IntraVENous Daily    insulin glargine  0.15 Units/kg SubCUTAneous Nightly    [START ON 8/31/2023] insulin lispro  0-8 Units SubCUTAneous TID WC    [START ON 8/31/2023] insulin lispro  0-4 Units SubCUTAneous Nightly    methocarbamol  750 mg Oral TID     Continuous Infusions:   sodium chloride Stopped (08/29/23 0816)    norepinephrine Stopped (08/30/23 0800)    dextrose      insulin 3.31 Units/hr (08/30/23 0825)     PRN Meds:.albuterol, sodium chloride flush, sodium chloride, ondansetron **OR** ondansetron, oxyCODONE, morphine **OR** [DISCONTINUED] morphine, magnesium hydroxide, potassium chloride, magnesium sulfate, norepinephrine, glucose, dextrose bolus **OR** dextrose bolus, glucagon (rDNA), dextrose    Objective/     Vitals:

## 2023-08-30 NOTE — CONSULTS
Patient: Lorena Ferrer  [id number]  Date: 8/30/2023      Chief Complaint: chest pain    History of Present Illness/Hospital Course:  Silverio Schumacher is a 74year old male with a past medical history significant for CAD, COPD, HTN, HLD who presented to [facility] on 8/22/23 with chest pain, admitted with NSTEMI. Cardiology was consulted and on 8/23 he underwent cardiac catheterization which revealed multivessel CAD. Nephrology was consulted due to GAYATHRI on CKD. CT Surgery was consulted and on 8/28 he underwent CABG x3. Post operative course notable for hypotension requiring pressors, thrombocytopenia. He continues to have functional deficits below his baseline. Today Azael Leiva is seen in his room without family present. He reports feeling improved and denies current pain. He reports some continued shortness of breath. He is motivated to work with therapies and interested in ARU. has a past medical history of CAD (coronary artery disease), Cancer ([address]), COPD (chronic obstructive pulmonary disease) ([address]), Depression, Heart attack ([address]), History of renal calculi, Hyperlipidemia, Hypertension, Hyperthyroidism, and RBBB. has a past surgical history that includes Cardiac surgery (2010); Colonoscopy (5/2/14); Cystoscopy; Cystoscopy (Left, 10/22/2021); and Coronary artery bypass graft (N/A, 8/28/2023). reports that he quit smoking about 4 years ago. His smoking use included cigarettes. He has a 20.00 pack-year smoking history. He has never used smokeless tobacco. He reports that he does not drink alcohol and does not use drugs. family history includes Arthritis in his sister; Hearing Loss in his mother; Heart Disease in his father and mother; High Blood Pressure in his father and mother; High Cholesterol in his father, mother, and sister; Stroke in his maternal aunt.       REVIEW OF SYSTEMS:   CONSTITUTIONAL: negative for fevers, chills, diaphoresis, activity change, appetite change, fatigue, night sweats and unexpected

## 2023-08-30 NOTE — PROGRESS NOTES
Chest tubes meet criteria to remove per open heart protocol. If chest tubes do not meet criteria, a specific order has been entered to remove. No air leak. No crepitus. Pt instructed on procedure. Pt premedicated with morphine 2mg IVP. Site cleansed and prepped per protocol. Chest tubes X 2 removed without difficulty. Dry sterile dressing and vaseline gauze applied. Bilateral breath sounds audible. O2Sats 96% on 2L nasal cannula. Incision site within normal limits. Patient tolerated well.

## 2023-08-30 NOTE — PROGRESS NOTES
Occupational Therapy  Facility/Department: [facility] C2 CARD TELEMETRY  Daily Treatment Note  NAME: Sindy Martinez  : 1950  MRN: [id number]    Date of Service: 2023    Discharge Recommendations:  IP Rehab  OT Equipment Recommendations  Equipment Needed: No  Therapy discharge recommendations take into account each patient's current medical complexities and are subject to input/oversight from the patient's healthcare team.     Barriers to Home Discharge:   [] Steps to access home entry or bed/bath:   [x] Unable to transfer, ambulate, or propel wheelchair household distances without assist   [x] Limited available assist at home upon discharge    [x] Patient or family requests d/c to post-acute facility    [] Poor cognition/safety awareness for d/c to home alone    [] Unable to maintain ordered weight bearing status    [] Patient with salient signs of long-standing immobility   [x] Decreased independence with ADLs   [x] Increased risk for falls   [] Other:    If pt is unable to be seen after this session, please let this note serve as discharge summary. Please see case management note for discharge disposition. Thank you. Patient Diagnosis(es): The primary encounter diagnosis was Chest pain, unspecified type. Diagnoses of Coronary artery disease involving native coronary artery of native heart without angina pectoris and Coronary artery disease, unspecified vessel or lesion type, unspecified whether angina present, unspecified whether native or transplanted heart were also pertinent to this visit. Assessment    Assessment: Pt seen for ADLs and mobility. Pt tolerated treatment well this date when ambulating small steps in room. Pt demo's decrease BLE and requires min vbc's for sternal precautions. Provided pt benefits and education of receiving IPR to increase overall participation in functional activities. Pt verbalized understanding and was in agreement.  Pt continues to benefit from skilled OT to increase fall risk precautions in place; Left in chair;Call light within reach;Nurse notified;Gait belt  Restraints  Restraints Initially in Place: No     Patient Education  Education Given To: Patient  Education Provided: Role of Therapy;Plan of Care;Transfer Training;ADL Adaptive Strategies;Precautions; Fall Prevention Strategies  Education Provided Comments: sternal precautions  Education Method: Verbal;Demonstration  Barriers to Learning: None  Education Outcome: Verbalized understanding;Continued education needed;Demonstrated understanding  Disease Specific Education: Pt educated on sternal precautions, safe mobility, energy conservation, and task modification.  Pt verbalized understanding    AM-PAC Daily Activity - Inpatient   How much help is needed for putting on and taking off regular lower body clothing?: A Lot  How much help is needed for bathing (which includes washing, rinsing, drying)?: A Lot  How much help is needed for toileting (which includes using toilet, bedpan, or urinal)?: Total  How much help is needed for putting on and taking off regular upper body clothing?: A Lot  How much help is needed for taking care of personal grooming?: A Little  How much help for eating meals?: A Little  [facility] Raw Score: 13  AM-PAC Inpatient ADL T-Scale Score : 32.03  ADL Inpatient CMS 0-100% Score: 63.03  ADL Inpatient CMS G-Code Modifier : CL    Goals  Short Term Goals  Time Frame for Short Term Goals: 1 week - 9/05  Short Term Goal 1: Pt will complete TB dressing with SPV while maintaining sternal px-ONGOING 8/30  Short Term Goal 2: Pt will complete toilet transfer with SPV- ONGOING 8/30  Short Term Goal 3: Pt will verbalize 3/3 sternal precautions with 0 verbal cues- ONGOING 8/30  Short Term Goal 4: Pt will perform x 15 reps BUE ex to inc activity tolerance for ADLs- GOAL MET 8/30 pt completed BUE  Patient Goals   Patient goals : to go home     Therapy Time   Individual Concurrent Group

## 2023-08-30 NOTE — CARE COORDINATION
[facility] [place], [facility]. can service for nurse and PT, may not have staff for OT. Writer will follow today's progress with PT/OT, pt may need to consider facility, will need precert. ELVA RamirezRN     [id number] Addendum:  Writer aware pt now considering IPR, referral given to A ARU by Tano Ventura following and will likely start precert tomorrow when pt off insulin drip. Writer attempted to meet with pt, but currently sleeping soundly in bed, no family bedside.   JOHNNA Ramirez

## 2023-08-30 NOTE — CARE COORDINATION
[facility] Acute Rehab Unit   After review, this patient is felt to be:       []  Appropriate for Acute Inpatient Rehab    []  Appropriate for Acute Inpatient Rehab Pending Insurance Authorization    []  Not appropriate for Acute Inpatient Rehab    [x]  Referral received and ARU reviewing patient; Evaluation ongoing. Will follow for medical and therapy progress. Will notify DCP with further updates.  Thank you for the referral.  Demetris Cardenas RN

## 2023-08-30 NOTE — PROGRESS NOTES
lydia green     Subjective    Subjective  Subjective: \"I feel alright. \"  Pain: denies pain at rest  Orientation  Overall Orientation Status: Within Normal Limits  Orientation Level: Oriented X4  Cognition  Overall Cognitive Status: WFL     Objective   Vitals  Pulse: 90  Heart Rate Source: Monitor  BP: (!) 105/43  BP Location: Left upper arm  BP Method: Automatic  MAP (Calculated): 64  SpO2: 92 % (post transfer)  O2 Device: Nasal cannula  Comment: 2L  Bed Mobility Training  Bed Mobility Training: Yes  Overall Level of Assistance: Moderate assistance;Assist X2  Interventions: Verbal cues; Safety awareness training; Tactile cues  Sit to Supine: Moderate assistance;Assist X2  Transfer Training  Transfer Training: Yes  Overall Level of Assistance: Contact-guard assistance  Interventions: Verbal cues; Safety awareness training  Sit to Stand: Assist X1;Contact-guard assistance (4x STS)  Bed to Chair: Assist X2;Contact-guard assistance  Gait Training  Gait Training: Yes  Gait  Overall Level of Assistance: Contact-guard assistance  Interventions: Safety awareness training;Verbal cues  Distance (ft): 12 Feet (stepped F and B due to lines restricting distance)  Assistive Device: Walker, rollator           Safety Devices  Type of Devices: All fall risk precautions in place; Left in chair;Call light within reach;Nurse notified;Gait belt  Restraints  Restraints Initially in Place: No       Goals  Short Term Goals  Time Frame for Short Term Goals: 9/5/23--8/30 continue all goals  Short Term Goal 1: Pt will complete supine>sit with min A. Short Term Goal 2: Pt will complete STS with SBA  Short Term Goal 3: Pt will ambulate 150 ft with SBA and LRAD. Short Term Goal 4: Pt will tolerate 12-15 reps of LE exercises to progress endurance by 9/2. Short Term Goal 5: Pt will climb 2 stairs with handrails prn and SBA. Patient Goals   Patient Goals :  \"Get up to the [place]    Education  Patient Education  Education Given To: Patient  Education Provided: Role of Therapy;Plan of Care;Orientation;Precautions;Transfer Training;Energy Conservation  Education Provided Comments: role of PT, sternal precautions, transfer technique, vitals response with activity  Education Method: Verbal  Barriers to Learning: None  Education Outcome: Verbalized understanding    [facility] 6 Clicks Inpatient Mobility:  AM-PAC Basic Mobility - Inpatient   How much help is needed turning from your back to your side while in a flat bed without using bedrails?: A Little  How much help is needed moving from lying on your back to sitting on the side of a flat bed without using bedrails?: A Little  How much help is needed moving to and from a bed to a chair?: A Little  How much help is needed standing up from a chair using your arms?: A Little  How much help is needed walking in hospital room?: A Little  How much help is needed climbing 3-5 steps with a railing?: A Lot  AM-PAC Inpatient Mobility Raw Score : 17  AM-PAC Inpatient T-Scale Score : 42.13  Mobility Inpatient CMS 0-100% Score: 50.57  Mobility Inpatient CMS G-Code Modifier : CK    Co-tx collaboration this date to safely meet goals and will have better occupational performance outcomes with in a co-treatment than 1:1 session.     Therapy Time   Individual Concurrent Group Co-treatment   Time In       3615   Time Out       1022   Minutes       28   Timed Code Treatment Minutes: 29 Minutes       Niru Duke PT

## 2023-08-30 NOTE — PROGRESS NOTES
CVTS Cardiothoracic Progress Note:                                CC:  Post op follow up     Surgery: 8/28 CORONARY ARTERY BYPASS GRAFTING TIMES THREE USING INTERNAL MAMMARY ARTERY AND RIGHT SAPHNOUS VEIN VIA ENDOSCOPIC HARVEST WITH LEFT ATRIAL APPENDAGE CLIP PLACEMENT, PLACEMENT TEMPORARY PACING WIRES, ON PUMP, GINA, BILATERAL PECTORALIS BLOCKS     BYPASS GRAFTS:  1. LIMA to LAD  2. SVG to OM1  3. SVG to [address] course:  8/28 DOS- Extubated @ 1450 to 3L NC. Vaso added. 1L albumin total given. A&O. Following commands. Robaxin added for continued c/o pain    8/29 POD 1- TPW removed this AM. Plan to remove MS chest tube. Holding BB 2/2 low normal SBP. Start diuresis as tolerated. Flowtrack removed. Weaned off Vaso    8/30 POD 2- Confusion overnight. Multiple doses of 4mg Morphine given. Back on low dose Levo. Wean Levo as tolerated. Morphine discontinued. Cr. 1.8 this AM. Arixtra switched to heparin. Plan to remove CT, rgeen and a line.     Past Medical History:   Diagnosis Date    CAD (coronary artery disease)     Cancer ([address])     BLADDER    COPD (chronic obstructive pulmonary disease) ([address])     Depression     ADJUSTMENT DISORDER    Heart attack ([address]) 10/10    History of renal calculi     Hyperlipidemia     Hypertension     Hyperthyroidism     RBBB         Past Surgical History:   Procedure Laterality Date    CARDIAC SURGERY  2010    2 stents    COLONOSCOPY  5/2/14    colonoscopy and egd    CORONARY ARTERY BYPASS GRAFT N/A 8/28/2023    CORONARY ARTERY BYPASS GRAFTING TIMES THREE USING INTERNAL MAMMARY ARTERY AND RIGHT SAPHNOUS VEIN VIA ENDOSCOPIC HARVEST WITH LEFT ATRIAL APPENDAGE CLIP PLACEMENT, PLACEMENT TEMPORARY PACING WIRES, ON PUMP, GINA, BILATERAL PECTORALIS BLOCKS performed by Shahida Villafana MD at [address] [place] [address] 10/22/2021    CYSTOSCOPY, TRANSURETHRAL RESECTION OF BLADDER TUMOR, LEFT STENT REMOVAL performed by Gricelda Dorsey MD at [address] [place] [address] as of 08/22/2023 - 08/28/23  1200 08/29/23  0335 08/30/23  0411   MG 3.10* 2.20 2.20        Cardiac Enzymes: No results for input(s): CKTOTAL, CKMB, CKMBINDEX, TROPONINI in the last 72 hours. PT/INR:   Recent Labs     08/28/23  1200 08/29/23  0335 08/30/23  0411   PROTIME 20.1* 16.5* 18.0*   INR 1.73* 1.34* 1.49*       APTT:   Recent Labs     08/28/23  1200   APTT 35.6           CXR: 08/30/23    FINDINGS:  Sternotomy wires at midline. Heart and mediastinum appear normal otherwise. Mild degree of vascular congestion stable centrally. Vascular sheath  unchanged on the right. Trace bibasilar pleural fluid. No skeletal  abnormality. IMPRESSION:  Relatively stable pattern of mild vascular congestion. ________________________________________________________________________    Subjective:   Dietary Intake: Decreased    Nausea : Denies  Pain Control: Improved  Complaints: Post op pain  Bowels: No BM    Objective:   General appearance: Alert, NAD  Lungs: diminished bases  Heart: S1S2 regular rate and rhythm ; SR on monitor  Chest: symmetrical expansion with inspiration and expirations; no rocking of sternum noted   Abdomen: rounded, soft  Bowel sounds: hypoactive  Kidneys: Cr 1.8 this AM. UOP 2.3 L / last 24 hr  Wound/Incisions: Midsternal incision with dressing CDI; RLE incisions with dressings CDI; Pacing wires removed 8/29. CT CDI  Extremities: BLE pulses +1; +1 swelling noted in BLE  Neurological: A&O, grossly non focal. VALENZUELA x 4  Chest tubes/Drains: Chest tube # 1 with 70-0-200= 270 cc of serosanguineous drainage in 24 hours;  no airleaks noted in either tube     Assessment:   Post-op: 2 days. Condition: In stable condition. Plan:   1. Cardiovascular:   - s/p CABG x 3 w/ IGLESIA clip. On ASA, Plavix, Statin. - Holding BB today 2/2 low normal SBP. Vaso weaned off yesterday. Restarted low dose Levo overnight 2/2 hypotension. Consider starting low dose BB pending BP and HR trend    2. Pulmonary:   - AM CXR reviewed.  Expected

## 2023-08-30 NOTE — CARE COORDINATION
[facility] Acute Rehab Unit   After review, this patient is felt to be:       []  Appropriate for Acute Inpatient Rehab    [x]  Appropriate for Acute Inpatient Rehab Pending Insurance Authorization, will start precert when medically appropriate, still on insulin drip, discussed with Jorge Butler CM.    []  Not appropriate for Acute Inpatient Rehab    []  Referral received and ARU reviewing patient; Evaluation ongoing. Pt in agreement per  conversation with pt this afternoon. Will notify DCP with further updates.  Thank you for the referral.  Flower Lazar RN

## 2023-08-30 NOTE — PLAN OF CARE
Problem: Discharge Planning  Goal: Discharge to home or other facility with appropriate resources  Outcome: Progressing     Problem: Pain  Goal: Verbalizes/displays adequate comfort level or baseline comfort level  Outcome: Progressing     Problem: Safety - Adult  Goal: Free from fall injury  Outcome: Progressing     Problem: Nutrition Deficit:  Goal: Optimize nutritional status  Outcome: Progressing     Problem: Skin/Tissue Integrity  Goal: Absence of new skin breakdown  Description: 1. Monitor for areas of redness and/or skin breakdown  2. Assess vascular access sites hourly  3. Every 4-6 hours minimum:  Change oxygen saturation probe site  4. Every 4-6 hours:  If on nasal continuous positive airway pressure, respiratory therapy assess nares and determine need for appliance change or resting period.   Outcome: Progressing

## 2023-08-31 ENCOUNTER — APPOINTMENT (OUTPATIENT)
Dept: GENERAL RADIOLOGY | Age: 73
End: 2023-08-31
Payer: MEDICARE

## 2023-08-31 LAB
ANION GAP SERPL CALCULATED.3IONS-SCNC: 9 MMOL/L (ref 3–16)
BUN SERPL-MCNC: 27 MG/DL (ref 7–20)
CALCIUM SERPL-MCNC: 8.6 MG/DL (ref 8.3–10.6)
CHLORIDE SERPL-SCNC: 104 MMOL/L (ref 99–110)
CO2 SERPL-SCNC: 25 MMOL/L (ref 21–32)
CREAT SERPL-MCNC: 1.2 MG/DL (ref 0.8–1.3)
DEPRECATED RDW RBC AUTO: 17.7 % (ref 12.4–15.4)
GFR SERPLBLD CREATININE-BSD FMLA CKD-EPI: >60 ML/MIN/{1.73_M2}
GLUCOSE BLD-MCNC: 168 MG/DL (ref 70–99)
GLUCOSE BLD-MCNC: 173 MG/DL (ref 70–99)
GLUCOSE SERPL-MCNC: 149 MG/DL (ref 70–99)
HCT VFR BLD AUTO: 30.3 % (ref 40.5–52.5)
HGB BLD-MCNC: 9.9 G/DL (ref 13.5–17.5)
MAGNESIUM SERPL-MCNC: 2 MG/DL (ref 1.8–2.4)
MCH RBC QN AUTO: 26.6 PG (ref 26–34)
MCHC RBC AUTO-ENTMCNC: 32.8 G/DL (ref 31–36)
MCV RBC AUTO: 81.2 FL (ref 80–100)
PERFORMED ON: ABNORMAL
PERFORMED ON: ABNORMAL
PLATELET # BLD AUTO: 86 K/UL (ref 135–450)
PMV BLD AUTO: 8.7 FL (ref 5–10.5)
POTASSIUM SERPL-SCNC: 3.5 MMOL/L (ref 3.5–5.1)
RBC # BLD AUTO: 3.73 M/UL (ref 4.2–5.9)
SODIUM SERPL-SCNC: 138 MMOL/L (ref 136–145)
WBC # BLD AUTO: 11.3 K/UL (ref 4–11)

## 2023-08-31 PROCEDURE — 83735 ASSAY OF MAGNESIUM: CPT

## 2023-08-31 PROCEDURE — 97530 THERAPEUTIC ACTIVITIES: CPT

## 2023-08-31 PROCEDURE — 97110 THERAPEUTIC EXERCISES: CPT

## 2023-08-31 PROCEDURE — 6360000002 HC RX W HCPCS

## 2023-08-31 PROCEDURE — 97116 GAIT TRAINING THERAPY: CPT

## 2023-08-31 PROCEDURE — 94640 AIRWAY INHALATION TREATMENT: CPT

## 2023-08-31 PROCEDURE — 85027 COMPLETE CBC AUTOMATED: CPT

## 2023-08-31 PROCEDURE — 6370000000 HC RX 637 (ALT 250 FOR IP): Performed by: STUDENT IN AN ORGANIZED HEALTH CARE EDUCATION/TRAINING PROGRAM

## 2023-08-31 PROCEDURE — 2580000003 HC RX 258: Performed by: STUDENT IN AN ORGANIZED HEALTH CARE EDUCATION/TRAINING PROGRAM

## 2023-08-31 PROCEDURE — 80048 BASIC METABOLIC PNL TOTAL CA: CPT

## 2023-08-31 PROCEDURE — 6370000000 HC RX 637 (ALT 250 FOR IP): Performed by: THORACIC SURGERY (CARDIOTHORACIC VASCULAR SURGERY)

## 2023-08-31 PROCEDURE — 2500000003 HC RX 250 WO HCPCS: Performed by: THORACIC SURGERY (CARDIOTHORACIC VASCULAR SURGERY)

## 2023-08-31 PROCEDURE — 6360000002 HC RX W HCPCS: Performed by: STUDENT IN AN ORGANIZED HEALTH CARE EDUCATION/TRAINING PROGRAM

## 2023-08-31 PROCEDURE — 71045 X-RAY EXAM CHEST 1 VIEW: CPT

## 2023-08-31 PROCEDURE — 94669 MECHANICAL CHEST WALL OSCILL: CPT

## 2023-08-31 PROCEDURE — 2140000000 HC CCU INTERMEDIATE R&B

## 2023-08-31 PROCEDURE — 2700000000 HC OXYGEN THERAPY PER DAY

## 2023-08-31 PROCEDURE — 99024 POSTOP FOLLOW-UP VISIT: CPT

## 2023-08-31 PROCEDURE — 94761 N-INVAS EAR/PLS OXIMETRY MLT: CPT

## 2023-08-31 RX ORDER — METOPROLOL TARTRATE 5 MG/5ML
5 INJECTION INTRAVENOUS ONCE
Status: COMPLETED | OUTPATIENT
Start: 2023-08-31 | End: 2023-08-31

## 2023-08-31 RX ORDER — AMIODARONE HYDROCHLORIDE 200 MG/1
400 TABLET ORAL 2 TIMES DAILY
Status: DISCONTINUED | OUTPATIENT
Start: 2023-08-31 | End: 2023-09-01 | Stop reason: HOSPADM

## 2023-08-31 RX ADMIN — POTASSIUM CHLORIDE 20 MEQ: 750 TABLET, EXTENDED RELEASE ORAL at 07:41

## 2023-08-31 RX ADMIN — FUROSEMIDE 40 MG: 40 TABLET ORAL at 17:24

## 2023-08-31 RX ADMIN — CHLORHEXIDINE GLUCONATE 15 ML: 1.2 RINSE ORAL at 07:41

## 2023-08-31 RX ADMIN — AMIODARONE HYDROCHLORIDE 400 MG: 200 TABLET ORAL at 20:14

## 2023-08-31 RX ADMIN — POTASSIUM CHLORIDE 20 MEQ: 29.8 INJECTION, SOLUTION INTRAVENOUS at 04:10

## 2023-08-31 RX ADMIN — Medication 400 MG: at 07:40

## 2023-08-31 RX ADMIN — POTASSIUM CHLORIDE 20 MEQ: 29.8 INJECTION, SOLUTION INTRAVENOUS at 06:05

## 2023-08-31 RX ADMIN — Medication 400 MG: at 20:14

## 2023-08-31 RX ADMIN — METHIMAZOLE 5 MG: 5 TABLET ORAL at 08:45

## 2023-08-31 RX ADMIN — HEPARIN SODIUM 5000 UNITS: 5000 INJECTION INTRAVENOUS; SUBCUTANEOUS at 05:53

## 2023-08-31 RX ADMIN — AMIODARONE HYDROCHLORIDE 0.5 MG/MIN: 50 INJECTION, SOLUTION INTRAVENOUS at 07:00

## 2023-08-31 RX ADMIN — ACETAMINOPHEN 650 MG: 325 TABLET ORAL at 23:11

## 2023-08-31 RX ADMIN — AMIODARONE HYDROCHLORIDE 1 MG/MIN: 50 INJECTION, SOLUTION INTRAVENOUS at 01:06

## 2023-08-31 RX ADMIN — CHLORHEXIDINE GLUCONATE 15 ML: 1.2 RINSE ORAL at 20:15

## 2023-08-31 RX ADMIN — METOPROLOL TARTRATE 12.5 MG: 25 TABLET, FILM COATED ORAL at 20:14

## 2023-08-31 RX ADMIN — SERTRALINE 100 MG: 50 TABLET, FILM COATED ORAL at 07:41

## 2023-08-31 RX ADMIN — ACETAMINOPHEN 650 MG: 325 TABLET ORAL at 05:53

## 2023-08-31 RX ADMIN — METOPROLOL TARTRATE 5 MG: 5 INJECTION INTRAVENOUS at 03:57

## 2023-08-31 RX ADMIN — POTASSIUM CHLORIDE 20 MEQ: 750 TABLET, EXTENDED RELEASE ORAL at 20:14

## 2023-08-31 RX ADMIN — ASPIRIN 81 MG: 81 TABLET, COATED ORAL at 07:41

## 2023-08-31 RX ADMIN — HEPARIN SODIUM 5000 UNITS: 5000 INJECTION INTRAVENOUS; SUBCUTANEOUS at 22:40

## 2023-08-31 RX ADMIN — INSULIN GLARGINE 12 UNITS: 100 INJECTION, SOLUTION SUBCUTANEOUS at 20:26

## 2023-08-31 RX ADMIN — Medication 10 ML: at 20:15

## 2023-08-31 RX ADMIN — TIOTROPIUM BROMIDE INHALATION SPRAY 2 PUFF: 3.12 SPRAY, METERED RESPIRATORY (INHALATION) at 08:37

## 2023-08-31 RX ADMIN — AMIODARONE HYDROCHLORIDE 400 MG: 200 TABLET ORAL at 03:13

## 2023-08-31 RX ADMIN — MUPIROCIN: 20 OINTMENT TOPICAL at 20:15

## 2023-08-31 RX ADMIN — DEXTROSE MONOHYDRATE 150 MG: 50 INJECTION, SOLUTION INTRAVENOUS at 00:53

## 2023-08-31 RX ADMIN — HEPARIN SODIUM 5000 UNITS: 5000 INJECTION INTRAVENOUS; SUBCUTANEOUS at 14:42

## 2023-08-31 RX ADMIN — ACETAMINOPHEN 650 MG: 325 TABLET ORAL at 13:13

## 2023-08-31 RX ADMIN — FAMOTIDINE 20 MG: 20 TABLET ORAL at 07:41

## 2023-08-31 RX ADMIN — METHOCARBAMOL 750 MG: 750 TABLET ORAL at 14:43

## 2023-08-31 RX ADMIN — CLOPIDOGREL BISULFATE 75 MG: 75 TABLET ORAL at 07:41

## 2023-08-31 RX ADMIN — Medication 2 PUFF: at 19:33

## 2023-08-31 RX ADMIN — METHOCARBAMOL 750 MG: 750 TABLET ORAL at 07:40

## 2023-08-31 RX ADMIN — METOPROLOL TARTRATE 12.5 MG: 25 TABLET, FILM COATED ORAL at 07:41

## 2023-08-31 RX ADMIN — Medication 2 PUFF: at 08:37

## 2023-08-31 RX ADMIN — METHOCARBAMOL 750 MG: 750 TABLET ORAL at 20:14

## 2023-08-31 RX ADMIN — ATORVASTATIN CALCIUM 40 MG: 40 TABLET, FILM COATED ORAL at 20:14

## 2023-08-31 RX ADMIN — OXYCODONE HYDROCHLORIDE 5 MG: 5 TABLET ORAL at 15:43

## 2023-08-31 RX ADMIN — FUROSEMIDE 40 MG: 40 TABLET ORAL at 07:40

## 2023-08-31 NOTE — CARE COORDINATION
S/P CABG - pre cert initiated for ARU with [facility] [employer]. [facility] has notified ARU of intent to deny, and offered P2P to be done before [facility] 9/1/23. This writer spoke with [employer] (CNP/CTS). She is agreeable to Dr Jay Zepeda ([facility]) doing P2P  conference. Spoke with Atiya Bro (ARU liaison). She will pass message on to Dr Jay Zepeda. Updated patient at bedside. 1630 This writer updated by Atiya Bro  (ARU) that P2P by Dr Jay Zepeda was unsuccessful -ARU denied. Contacted spouse by phone for conversation. We agreed to meet tomorrow AM to discuss options.      Shea Okeefe RN

## 2023-08-31 NOTE — PROGRESS NOTES
Physical Therapy  Facility/Department: [facility] C2 CARD TELEMETRY  Daily Treatment Note  NAME: Katie Blancas  : 1950  MRN: [id number]    Date of Service: 2023    Discharge Recommendations:  IP Rehab   PT Equipment Recommendations  Equipment Needed: No    Therapy discharge recommendations take into account each patient's current medical complexities and are subject to input/oversight from the patient's healthcare team.   Barriers to Home Discharge:   [x] Steps to access home entry or bed/bath:   [x] Unable to transfer, ambulate, or propel wheelchair household distances without assist   [] Limited available assist at home upon discharge    [] Patient or family requests d/c to post-acute facility    [x] Poor cognition/safety awareness for d/c to home alone    []Unable to maintain ordered weight bearing status    [] Patient with salient signs of long-standing immobility   [x] Patient is at risk for falls due to:   [] Other:    If pt is unable to be seen after this session, please let this note serve as discharge summary. Please see case management note for discharge disposition. Thank you. Patient Diagnosis(es): The primary encounter diagnosis was Chest pain, unspecified type. Diagnoses of Coronary artery disease involving native coronary artery of native heart without angina pectoris and Coronary artery disease, unspecified vessel or lesion type, unspecified whether angina present, unspecified whether native or transplanted heart were also pertinent to this visit. Assessment   Assessment: Pt presents to PT tx below baseline. Pt has increased pain, decreased mobility/endurance/strength. Pt able to walk 30 ft CGA and STS with SBA. Pt reports of SOB with activity but continues to have stable SP02. BP is repetatively low but improved with seated LE exercises. RN made aware. MD also present in room mid session and discussed BP with pt.  Pt would continue to benefit from skilled PT to aid in the above deficits and a safe DC to IPR when medically stable. Activity Tolerance: Patient tolerated evaluation without incident;Patient limited by fatigue;Patient limited by endurance  Equipment Needed: No     Plan    Physcial Therapy Plan  General Plan: 5-7 times per week  Current Treatment Recommendations: Strengthening;Balance training;Functional mobility training;Transfer training; Endurance training;Gait training;Stair training;Home exercise program;Safety education & training;Patient/Caregiver education & training;Equipment evaluation, education, & procurement; Therapeutic activities     Restrictions  Restrictions/Precautions  Restrictions/Precautions: General Precautions, Cardiac, Up as Tolerated, Fall Risk  Required Braces or Orthoses?: No  Position Activity Restriction  Sternal Precautions: No Pushing, No Pulling, 5# Lifting Restrictions  Other position/activity restrictions: tele, RIJ     Subjective    Subjective  Subjective: \"I have suddent urges to go to the bathroom. I would feel more comfortable having underwear on. \"  Pain: 2/10 at rest  Orientation  Overall Orientation Status: Within Normal Limits  Orientation Level: Oriented X4  Cognition  Overall Cognitive Status: WFL     Objective   Vitals  Pulse: 66  Heart Rate Source: Monitor  BP: (!) 107/49  BP Location: Left upper arm  BP Method: Automatic  Patient Position: Up in chair  MAP (Calculated): 68  SpO2: 98 %  O2 Device: None (Room air) (pt found on room air and reported that they took it off this morning. Sp02 stable throughout session)  Comment: BP 78/41 with first STS, 129/56 post session in bed  Bed Mobility Training  Bed Mobility Training: Yes  Overall Level of Assistance: Moderate assistance;Assist X1  Interventions: Verbal cues; Safety awareness training; Tactile cues (needs cues for sternal precautions)  Supine to Sit: Other (comment) (left in chair)  Sit to Supine: Assist X1;Moderate assistance  Scooting: Stand-by assistance  Balance  Sitting:

## 2023-08-31 NOTE — CARE COORDINATION
Peer to peer completed by Dr. Nilson Booker and insurance MD upheld decision to deny ARU due to lack of rehab diagnosis.  Message left for Ezekiel Keith RN

## 2023-08-31 NOTE — PROGRESS NOTES
CVTS Cardiothoracic Progress Note:                                CC:  Post op follow up     Surgery: 8/28 CORONARY ARTERY BYPASS GRAFTING TIMES THREE USING INTERNAL MAMMARY ARTERY AND RIGHT SAPHNOUS VEIN VIA ENDOSCOPIC HARVEST WITH LEFT ATRIAL APPENDAGE CLIP PLACEMENT, PLACEMENT TEMPORARY PACING WIRES, ON PUMP, GINA, BILATERAL PECTORALIS BLOCKS     BYPASS GRAFTS:  1. LIMA to LAD  2. SVG to OM1  3. SVG to [address] course:  8/28 DOS- Extubated @ 1450 to 3L NC. Vaso added. 1L albumin total given. A&O. Following commands. Robaxin added for continued c/o pain    8/29 POD 1- TPW removed this AM. Plan to remove MS chest tube. Holding BB 2/2 low normal SBP. Start diuresis as tolerated. Flowtrack removed. Weaned off Vaso    8/30 POD 2- Confusion overnight. Multiple doses of 4mg Morphine given. Back on low dose Levo. Wean Levo as tolerated. Morphine discontinued. Cr. 1.8 this AM. Arixtra switched to heparin. Plan to remove CT, green and a line. 8/31- AF RVR overnight. Amio protocol. Converted back to NSR. Transition to PO Amio today. Remove CVC.  Plan for ARU tomorrow    Past Medical History:   Diagnosis Date    CAD (coronary artery disease)     Cancer ([address])     BLADDER    COPD (chronic obstructive pulmonary disease) ([address])     Depression     ADJUSTMENT DISORDER    Heart attack ([address]) 10/10    History of renal calculi     Hyperlipidemia     Hypertension     Hyperthyroidism     RBBB         Past Surgical History:   Procedure Laterality Date    CARDIAC SURGERY  2010    2 stents    COLONOSCOPY  5/2/14    colonoscopy and egd    CORONARY ARTERY BYPASS GRAFT N/A 8/28/2023    CORONARY ARTERY BYPASS GRAFTING TIMES THREE USING INTERNAL MAMMARY ARTERY AND RIGHT SAPHNOUS VEIN VIA ENDOSCOPIC HARVEST WITH LEFT ATRIAL APPENDAGE CLIP PLACEMENT, PLACEMENT TEMPORARY PACING WIRES, ON PUMP, GINA, BILATERAL PECTORALIS BLOCKS performed by Nadine Hopper MD at [address] [place] [address] Left 10/22/2021    CYSTOSCOPY,

## 2023-08-31 NOTE — PROGRESS NOTES
Pt suddenly flipped to Afib RVR to a rate of 130-140s. He is just sleeping and  not complaining of any pain nor on respiratory distress. Amiodarone 150mg IV bolus then followed by Amiodarone drip 1 mg/hour fo 6 hours then to decreased to 0.5 mg for the rest of 18 hours as per arrhythmia protocol.

## 2023-08-31 NOTE — PROGRESS NOTES
Pt is still Afib RVR with rate of 120-140s. Phoned Dra. Julienne Higgins referred to her the status of the Pt. She advised to give IV Metoprolol 5mg once- carried out by this writer.        Signed Electronically by Fely Sheffield, DARYNN, RN

## 2023-08-31 NOTE — PROGRESS NOTES
HR is still on Afib at 120-130s. Added oral Amiodarone 400mg BID for 3 days then to decrease to 200mg oral once daily as per arrhythmia protocol.

## 2023-08-31 NOTE — CARE COORDINATION
Call received from [facility] and MD is offering P2P option before final determination. To schedule call [phone] and it has to be scheduled by 2 pm on 9/1/23. Voice message left for ALEX Velasquez RN      [phone] Dr. Chris Sood called to schedule P2P.   Angela Pride RN

## 2023-08-31 NOTE — PROGRESS NOTES
Nephrology Progress Note   [email or web address]      Sub/interval history  Feels better, c/o dyspnea on exertion  levo at 8 and vaso--> weaned off on 8/30  Cr trended up to 1.5--> 1.8--> 1.2  Received lasix 20 mg iv q6h on 8/29 and q8h x3 on 8/30    Last 24 h uop 2.6 l  Admission wt 186 lbs; 189 lbs on 8/29--> 190 lbs on 8/31    ROS: No fever/nausea/vomiting  PSFH: No visitor    Scheduled Meds:   amiodarone  400 mg Oral BID    heparin (porcine)  5,000 Units SubCUTAneous 3 times per day    sertraline  100 mg Oral Daily    methIMAzole  5 mg Oral Daily    mometasone-formoterol  2 puff Inhalation BID RT    tiotropium  2 puff Inhalation Daily RT    sodium chloride flush  5-40 mL IntraVENous 2 times per day    aspirin  81 mg Oral Daily    clopidogrel  75 mg Oral Daily    acetaminophen  650 mg Oral Q6H    chlorhexidine  15 mL Mouth/Throat BID    furosemide  40 mg Oral BID    magnesium oxide  400 mg Oral BID    mupirocin   Each Nostril BID    potassium chloride  20 mEq Oral BID    bisacodyl  5 mg Oral Daily    polyethylene glycol  17 g Oral Daily    metoprolol tartrate  12.5 mg Oral BID    atorvastatin  40 mg Oral Nightly    famotidine  20 mg Oral Daily    insulin glargine  0.15 Units/kg SubCUTAneous Nightly    insulin lispro  0-8 Units SubCUTAneous TID WC    insulin lispro  0-4 Units SubCUTAneous Nightly    methocarbamol  750 mg Oral TID     Continuous Infusions:   amiodarone 0.5 mg/min (08/31/23 0700)    sodium chloride Stopped (08/29/23 0816)    dextrose      insulin Stopped (08/30/23 2200)     PRN Meds:.albuterol, sodium chloride flush, sodium chloride, ondansetron **OR** ondansetron, oxyCODONE, magnesium hydroxide, potassium chloride, magnesium sulfate, glucose, dextrose bolus **OR** dextrose bolus, glucagon (rDNA), dextrose    Objective/     Vitals:    08/31/23 0500 08/31/23 0600 08/31/23 0700 08/31/23 0837   BP: (!) 99/58 (!) 101/50 105/62    Pulse: (!) 102 69 71 65   Resp: 24 (!) 32 21 24   Temp:       TempSrc: SpO2: 97% 95% 100% 95%   Weight: 190 lb 11.2 oz (86.5 kg)      Height:       PF:         24HR INTAKE/OUTPUT:    Intake/Output Summary (Last 24 hours) at 8/31/2023 1134  Last data filed at 8/31/2023 0700  Gross per 24 hour   Intake 763.53 ml   Output 2210 ml   Net -1446.47 ml       Gen: alert, awake  Neck: No JVD  Skin: Unremarkable  Cardiovascular:  S1, S2 without m/r/g   Respiratory: decreased breath sounds  Abdomen:  soft, nt, nd,   Extremities: trace lower extremity edema  Neuro/Psy: AAoriented times 3 ; moves all 4 ext    Data/  Recent Labs     08/29/23 0335 08/30/23 0411 08/31/23  0323   WBC 17.8* 19.3* 11.3*   HGB 10.1* 9.8* 9.9*   HCT 31.4* 30.0* 30.3*   MCV 82.0 81.3 81.2   * 94* 86*       Recent Labs     08/29/23 0335 08/30/23 0411 08/31/23  0323    140 138   K 5.0 3.7 3.5    105 104   CO2 23 23 25   GLUCOSE 143* 83 149*   MG 2.20 2.20 2.00   BUN 19 26* 27*   CREATININE 1.5* 1.8* 1.2   LABGLOM 48* 39* >60         Assessment/   - GAYATHRI on CKD III: The patient has chronic kidney disease with a baseline creatinine of 1.3 to 1.5 mg/dl. His GAYATHRI is likely secondary to a pre-renal component. Cr back to baseline prior to CABG, trending up post op dt hemodynamic instability     - HTN: Blood pressure within acceptable range. On pressors on 8/29    - s/p CABG on 8/28/2023.    -Fever/leukocytosis per primary team    Plan/   -keep SBP >90   -monitor renal function with oral diuretics    -Serial renal panel  -daily wts and strict i/o  -renal dose medications   -avoid nephrotoxins      Mylene Mondragon MD  Office: [phone]  Fax:    [facility]

## 2023-09-01 ENCOUNTER — APPOINTMENT (OUTPATIENT)
Dept: GENERAL RADIOLOGY | Age: 73
End: 2023-09-01
Payer: MEDICARE

## 2023-09-01 VITALS
BODY MASS INDEX: 28.16 KG/M2 | HEART RATE: 66 BPM | SYSTOLIC BLOOD PRESSURE: 128 MMHG | DIASTOLIC BLOOD PRESSURE: 54 MMHG | RESPIRATION RATE: 18 BRPM | OXYGEN SATURATION: 94 % | HEIGHT: 68 IN | TEMPERATURE: 98.8 F | WEIGHT: 185.8 LBS

## 2023-09-01 PROBLEM — N17.0 ACUTE RENAL FAILURE WITH ACUTE TUBULAR NECROSIS SUPERIMPOSED ON STAGE 3A CHRONIC KIDNEY DISEASE (HCC): Status: RESOLVED | Noted: 2023-08-23 | Resolved: 2023-09-01

## 2023-09-01 PROBLEM — I21.4 NSTEMI (NON-ST ELEVATED MYOCARDIAL INFARCTION) (HCC): Status: RESOLVED | Noted: 2023-08-23 | Resolved: 2023-09-01

## 2023-09-01 PROBLEM — R07.9 CHEST PAIN: Status: RESOLVED | Noted: 2023-08-22 | Resolved: 2023-09-01

## 2023-09-01 PROBLEM — N18.31 ACUTE RENAL FAILURE WITH ACUTE TUBULAR NECROSIS SUPERIMPOSED ON STAGE 3A CHRONIC KIDNEY DISEASE (HCC): Status: RESOLVED | Noted: 2023-08-23 | Resolved: 2023-09-01

## 2023-09-01 PROBLEM — Z95.1 S/P CABG (CORONARY ARTERY BYPASS GRAFT): Status: ACTIVE | Noted: 2023-09-01

## 2023-09-01 LAB
ANION GAP SERPL CALCULATED.3IONS-SCNC: 9 MMOL/L (ref 3–16)
BUN SERPL-MCNC: 35 MG/DL (ref 7–20)
CALCIUM SERPL-MCNC: 8.4 MG/DL (ref 8.3–10.6)
CHLORIDE SERPL-SCNC: 106 MMOL/L (ref 99–110)
CO2 SERPL-SCNC: 26 MMOL/L (ref 21–32)
CREAT SERPL-MCNC: 1.3 MG/DL (ref 0.8–1.3)
DEPRECATED RDW RBC AUTO: 17.7 % (ref 12.4–15.4)
GFR SERPLBLD CREATININE-BSD FMLA CKD-EPI: 57 ML/MIN/{1.73_M2}
GLUCOSE BLD-MCNC: 130 MG/DL (ref 70–99)
GLUCOSE SERPL-MCNC: 124 MG/DL (ref 70–99)
HCT VFR BLD AUTO: 29.1 % (ref 40.5–52.5)
HGB BLD-MCNC: 9.4 G/DL (ref 13.5–17.5)
MAGNESIUM SERPL-MCNC: 2.2 MG/DL (ref 1.8–2.4)
MCH RBC QN AUTO: 26.3 PG (ref 26–34)
MCHC RBC AUTO-ENTMCNC: 32.3 G/DL (ref 31–36)
MCV RBC AUTO: 81.6 FL (ref 80–100)
PERFORMED ON: ABNORMAL
PLATELET # BLD AUTO: 111 K/UL (ref 135–450)
PMV BLD AUTO: 9.1 FL (ref 5–10.5)
POTASSIUM SERPL-SCNC: 4.2 MMOL/L (ref 3.5–5.1)
RBC # BLD AUTO: 3.57 M/UL (ref 4.2–5.9)
SODIUM SERPL-SCNC: 141 MMOL/L (ref 136–145)
WBC # BLD AUTO: 8.6 K/UL (ref 4–11)

## 2023-09-01 PROCEDURE — 36415 COLL VENOUS BLD VENIPUNCTURE: CPT

## 2023-09-01 PROCEDURE — 71045 X-RAY EXAM CHEST 1 VIEW: CPT

## 2023-09-01 PROCEDURE — 6360000002 HC RX W HCPCS

## 2023-09-01 PROCEDURE — 94640 AIRWAY INHALATION TREATMENT: CPT

## 2023-09-01 PROCEDURE — 6370000000 HC RX 637 (ALT 250 FOR IP): Performed by: STUDENT IN AN ORGANIZED HEALTH CARE EDUCATION/TRAINING PROGRAM

## 2023-09-01 PROCEDURE — 83735 ASSAY OF MAGNESIUM: CPT

## 2023-09-01 PROCEDURE — 85027 COMPLETE CBC AUTOMATED: CPT

## 2023-09-01 PROCEDURE — 94669 MECHANICAL CHEST WALL OSCILL: CPT

## 2023-09-01 PROCEDURE — 6370000000 HC RX 637 (ALT 250 FOR IP): Performed by: THORACIC SURGERY (CARDIOTHORACIC VASCULAR SURGERY)

## 2023-09-01 PROCEDURE — 80048 BASIC METABOLIC PNL TOTAL CA: CPT

## 2023-09-01 RX ORDER — OXYCODONE HYDROCHLORIDE 5 MG/1
5 TABLET ORAL EVERY 6 HOURS PRN
Qty: 28 TABLET | Refills: 0 | Status: SHIPPED | OUTPATIENT
Start: 2023-09-01 | End: 2023-09-08

## 2023-09-01 RX ORDER — BISACODYL 5 MG/1
5 TABLET, DELAYED RELEASE ORAL DAILY PRN
Qty: 14 TABLET | Refills: 0 | Status: SHIPPED | OUTPATIENT
Start: 2023-09-01 | End: 2023-09-15

## 2023-09-01 RX ORDER — FAMOTIDINE 20 MG/1
20 TABLET, FILM COATED ORAL DAILY
Qty: 30 TABLET | Refills: 0 | Status: SHIPPED | OUTPATIENT
Start: 2023-09-01 | End: 2023-10-01

## 2023-09-01 RX ORDER — ASPIRIN 81 MG/1
81 TABLET ORAL DAILY
Qty: 30 TABLET | Refills: 0 | Status: SHIPPED | OUTPATIENT
Start: 2023-09-01

## 2023-09-01 RX ORDER — POTASSIUM CHLORIDE 20 MEQ/1
20 TABLET, EXTENDED RELEASE ORAL DAILY
Status: DISCONTINUED | OUTPATIENT
Start: 2023-09-02 | End: 2023-09-01 | Stop reason: HOSPADM

## 2023-09-01 RX ORDER — LANOLIN ALCOHOL/MO/W.PET/CERES
400 CREAM (GRAM) TOPICAL DAILY
Qty: 7 TABLET | Refills: 0 | Status: SHIPPED | OUTPATIENT
Start: 2023-09-01 | End: 2023-09-08

## 2023-09-01 RX ORDER — CLOPIDOGREL BISULFATE 75 MG/1
75 TABLET ORAL DAILY
Qty: 30 TABLET | Refills: 0 | Status: SHIPPED | OUTPATIENT
Start: 2023-09-01

## 2023-09-01 RX ORDER — METHOCARBAMOL 750 MG/1
750 TABLET, FILM COATED ORAL 3 TIMES DAILY
Qty: 30 TABLET | Refills: 0 | Status: SHIPPED | OUTPATIENT
Start: 2023-09-01 | End: 2023-09-11

## 2023-09-01 RX ORDER — FUROSEMIDE 40 MG/1
40 TABLET ORAL DAILY
Status: DISCONTINUED | OUTPATIENT
Start: 2023-09-02 | End: 2023-09-01 | Stop reason: HOSPADM

## 2023-09-01 RX ORDER — POTASSIUM CHLORIDE 20 MEQ/1
20 TABLET, EXTENDED RELEASE ORAL DAILY
Qty: 7 TABLET | Refills: 0 | Status: SHIPPED | OUTPATIENT
Start: 2023-09-01 | End: 2023-09-08

## 2023-09-01 RX ORDER — FUROSEMIDE 40 MG/1
40 TABLET ORAL DAILY
Qty: 7 TABLET | Refills: 0 | Status: SHIPPED | OUTPATIENT
Start: 2023-09-02 | End: 2023-09-09

## 2023-09-01 RX ORDER — ATORVASTATIN CALCIUM 40 MG/1
40 TABLET, FILM COATED ORAL NIGHTLY
Qty: 30 TABLET | Refills: 0 | Status: SHIPPED | OUTPATIENT
Start: 2023-09-01

## 2023-09-01 RX ORDER — AMIODARONE HYDROCHLORIDE 200 MG/1
200 TABLET ORAL DAILY
Qty: 14 TABLET | Refills: 0 | Status: SHIPPED | OUTPATIENT
Start: 2023-09-01 | End: 2023-09-15

## 2023-09-01 RX ADMIN — METHIMAZOLE 5 MG: 5 TABLET ORAL at 09:00

## 2023-09-01 RX ADMIN — CLOPIDOGREL BISULFATE 75 MG: 75 TABLET ORAL at 10:11

## 2023-09-01 RX ADMIN — AMIODARONE HYDROCHLORIDE 400 MG: 200 TABLET ORAL at 10:11

## 2023-09-01 RX ADMIN — FAMOTIDINE 20 MG: 20 TABLET ORAL at 10:10

## 2023-09-01 RX ADMIN — POLYETHYLENE GLYCOL 3350 17 G: 17 POWDER, FOR SOLUTION ORAL at 10:10

## 2023-09-01 RX ADMIN — Medication 2 PUFF: at 07:59

## 2023-09-01 RX ADMIN — ASPIRIN 81 MG: 81 TABLET, COATED ORAL at 10:12

## 2023-09-01 RX ADMIN — SERTRALINE 100 MG: 50 TABLET, FILM COATED ORAL at 10:10

## 2023-09-01 RX ADMIN — BISACODYL 5 MG: 5 TABLET, COATED ORAL at 10:10

## 2023-09-01 RX ADMIN — METHOCARBAMOL 750 MG: 750 TABLET ORAL at 10:11

## 2023-09-01 RX ADMIN — ACETAMINOPHEN 650 MG: 325 TABLET ORAL at 06:24

## 2023-09-01 RX ADMIN — MUPIROCIN: 20 OINTMENT TOPICAL at 10:12

## 2023-09-01 RX ADMIN — Medication 400 MG: at 10:11

## 2023-09-01 RX ADMIN — HEPARIN SODIUM 5000 UNITS: 5000 INJECTION INTRAVENOUS; SUBCUTANEOUS at 06:24

## 2023-09-01 RX ADMIN — TIOTROPIUM BROMIDE INHALATION SPRAY 2 PUFF: 3.12 SPRAY, METERED RESPIRATORY (INHALATION) at 07:59

## 2023-09-01 RX ADMIN — METOPROLOL TARTRATE 12.5 MG: 25 TABLET, FILM COATED ORAL at 10:10

## 2023-09-01 RX ADMIN — POTASSIUM CHLORIDE 20 MEQ: 750 TABLET, EXTENDED RELEASE ORAL at 10:11

## 2023-09-01 NOTE — PLAN OF CARE
Problem: Discharge Planning  Goal: Discharge to home or other facility with appropriate resources  Outcome: Progressing     Problem: Pain  Goal: Verbalizes/displays adequate comfort level or baseline comfort level  Outcome: Progressing  Flowsheets (Taken 8/31/2023 2031)  Verbalizes/displays adequate comfort level or baseline comfort level:   Encourage patient to monitor pain and request assistance   Assess pain using appropriate pain scale   Administer analgesics based on type and severity of pain and evaluate response   Implement non-pharmacological measures as appropriate and evaluate response     Problem: Safety - Adult  Goal: Free from fall injury  Outcome: Progressing     Problem: Nutrition Deficit:  Goal: Optimize nutritional status  Outcome: Progressing     Problem: Skin/Tissue Integrity  Goal: Absence of new skin breakdown  Description: 1. Monitor for areas of redness and/or skin breakdown  2. Assess vascular access sites hourly  3. Every 4-6 hours minimum:  Change oxygen saturation probe site  4. Every 4-6 hours:  If on nasal continuous positive airway pressure, respiratory therapy assess nares and determine need for appliance change or resting period.   Outcome: Progressing     Problem: ABCDS Injury Assessment  Goal: Absence of physical injury  Outcome: Progressing

## 2023-09-01 NOTE — CARE COORDINATION
CASE MANAGEMENT DISCHARGE SUMMARY      [facility]    Precertification completed: yes  IMM given: 9/1/23      Transportation: wife here and will transport home      Confirmed discharge plan with: patient and wife        Facility/Agency, name:  [facility] - spoke with [facility] who will pull orders from [address] [place] [address], name: Fausto King    Note: Discharging nurse to complete LINDA, reconcile AVS, and place final copy with patient's discharge packet. RN to ensure that written prescriptions for  Level II medications are sent with patient to the facility as per protocol.          Serena Pa RN

## 2023-09-01 NOTE — PROGRESS NOTES
Central line removed without any complication. Gauze pressure dressing applied.        Signed Electronically by SEAN Champion, RN

## 2023-09-01 NOTE — PROGRESS NOTES
Discharge instructions discussed with pt and wife. No further questions. Medications filled at inpatient pharmacy.  No further needs

## 2023-09-01 NOTE — PROGRESS NOTES
CVTS Cardiothoracic Progress Note:                                CC:  Post op follow up     Surgery: 8/28 CORONARY ARTERY BYPASS GRAFTING TIMES THREE USING INTERNAL MAMMARY ARTERY AND RIGHT SAPHNOUS VEIN VIA ENDOSCOPIC HARVEST WITH LEFT ATRIAL APPENDAGE CLIP PLACEMENT, PLACEMENT TEMPORARY PACING WIRES, ON PUMP, GINA, BILATERAL PECTORALIS BLOCKS     BYPASS GRAFTS:  1. LIMA to LAD  2. SVG to OM1  3. SVG to [address] course:  8/28 DOS- Extubated @ 1450 to 3L NC. Vaso added. 1L albumin total given. A&O. Following commands. Robaxin added for continued c/o pain    8/29 POD 1- TPW removed this AM. Plan to remove MS chest tube. Holding BB 2/2 low normal SBP. Start diuresis as tolerated. Flowtrack removed. Weaned off Vaso    8/30 POD 2- Confusion overnight. Multiple doses of 4mg Morphine given. Back on low dose Levo. Wean Levo as tolerated. Morphine discontinued. Cr. 1.8 this AM. Arixtra switched to heparin. Plan to remove CT, green and a line. 8/31- AF RVR overnight. Amio protocol. Converted back to NSR. Transition to PO Amio today. Remove CVC. Plan for ARU tomorrow  9/1 Doing well. No acute event overnight. Remains in SR. ARU denied. Plan for D/C home today.     Past Medical History:   Diagnosis Date    CAD (coronary artery disease)     Cancer ([address])     BLADDER    COPD (chronic obstructive pulmonary disease) ([address])     Depression     ADJUSTMENT DISORDER    Heart attack ([address]) 10/10    History of renal calculi     Hyperlipidemia     Hypertension     Hyperthyroidism     RBBB         Past Surgical History:   Procedure Laterality Date    CARDIAC SURGERY  2010    2 stents    COLONOSCOPY  5/2/14    colonoscopy and egd    CORONARY ARTERY BYPASS GRAFT N/A 8/28/2023    CORONARY ARTERY BYPASS GRAFTING TIMES THREE USING INTERNAL MAMMARY ARTERY AND RIGHT SAPHNOUS VEIN VIA ENDOSCOPIC HARVEST WITH LEFT ATRIAL APPENDAGE CLIP PLACEMENT, PLACEMENT TEMPORARY PACING WIRES, ON PUMP, GINA, BILATERAL PECTORALIS BLOCKS performed by Shaunna Mari tubes/Drains: All out    Assessment:   Post-op: 4 days. Condition: In stable condition. Plan:   1. Cardiovascular:   - s/p CABG x 3 w/ IGLESIA clip. On ASA, Plavix, Statin, BB   - Improved BP trend. Low dose BB added    2. Pulmonary:   - AM CXR reviewed. Expected post-op changes and atelectasis. No ptx  - Continue expansion measures: IS, Acapella, OOB  - CXR in AM    3. Neurology:   - Continue scheduled Tylenol  - Continue PRN Morphine and Oxycodone    4. Nephrology:   - Cr. 1.3 this AM . Baseline around 1.4 . Avoid nephro toxins  -  mL/  last 24 hr x5 unmeasured occurrences  - Continue diuresis. Lasix PO Daily  - Daily weight  - I&O  - Daily BMP    5. Endocrinology:   - Pre- diabetes. A1C 6.3 pre-op   - Continue SSI     6. Hematology:    - Thrombocytopenia. Plt 111  this AM.  Monitor  - Daily CBC    7. Microbiology:   - Leukocytosis. Resolved. WBC 8.6 this AM.   - Daily CBC    8. GI:  - Continue post op bowel regimen    9. Nutrition:   - ADAT    10. Labs:   - AM labs and imaging reviewed as above  - E-lyte replacement PRN per post op protocol    11. Post-op Drains/Wires: All out    12. D/C plan: ARU pre-cert denied. Able to perform P2P by 2mp today, however pt wishes to go home. CM to discuss with pt spouse. Plan for D/C home later today.     13. Continue post-op care of patient in the ICU    GI prophylaxis: Pepcid  DVT prophylaxis: Heparin  ________________________________________________________________________  NEWTON Valentin - CNP,   9/1/2023  8:54 AM

## 2023-09-01 NOTE — DISCHARGE INSTR - COC
Continuity of Care Form    Patient Name: Laina Mcpherson   :  1950  MRN:  [id number]    Admit date:  2023  Discharge date:  23    Code Status Order: Full Code   Advance Directives:     Admitting Physician:  Sawyer Brownlee MD  PCP: CHRIS Armijo    Discharging Nurse: [facility] Unit/Room#: 0222/0222-01  Discharging Unit Phone Number: ***    Emergency Contact:   Extended Emergency Contact Information  Primary Emergency Contact: Brianna Alicia  Address: [address] [place] [address], [address] [place] [address] of [address] Setup Phone: [phone]  Mobile Phone: [phone]  Relation: Spouse    Past Surgical History:  Past Surgical History:   Procedure Laterality Date    CARDIAC SURGERY      2 stents    COLONOSCOPY  14    colonoscopy and egd    CORONARY ARTERY BYPASS GRAFT N/A 2023    CORONARY ARTERY BYPASS GRAFTING TIMES THREE USING INTERNAL MAMMARY ARTERY AND RIGHT SAPHNOUS VEIN VIA ENDOSCOPIC HARVEST WITH LEFT ATRIAL APPENDAGE CLIP PLACEMENT, PLACEMENT TEMPORARY PACING WIRES, ON PUMP, GINA, BILATERAL PECTORALIS BLOCKS performed by Sawyer Brownlee MD at [facility] 10/22/2021    CYSTOSCOPY, TRANSURETHRAL RESECTION OF BLADDER TUMOR, LEFT STENT REMOVAL performed by Helena Weaver MD at [facility]       Immunization History:   Immunization History   Administered Date(s) Administered    COVID-19, PFIZER GRAY top, DO NOT Dilute, (age 15 y+), IM, 30 mcg/0.3 mL 05/15/2022    COVID-19, PFIZER PURPLE top, DILUTE for use, (age 15 y+), 30mcg/0.3mL 2021, 2021    Influenza Virus Vaccine 2014, 2014, 2022    Influenza, FLUAD, (age 72 y+), Adjuvanted, 0.5mL 2022    Pneumococcal Conjugate 7-valent (Louise Margareth) 2014, 2014    Pneumococcal, PCV-13, PREVNAR 15, (age 6w+), IM, 0.5mL 2016    Pneumococcal, PPSV23, PNEUMOVAX 23, (age 2y+), SC/IM, 0.5mL 2014, 2020    Zoster Recombinant (Shingrix)

## 2023-09-05 ENCOUNTER — CARE COORDINATION (OUTPATIENT)
Dept: CASE MANAGEMENT | Age: 73
End: 2023-09-05

## 2023-09-05 ENCOUNTER — TELEPHONE (OUTPATIENT)
Dept: FAMILY MEDICINE CLINIC | Age: 73
End: 2023-09-05

## 2023-09-05 NOTE — CARE COORDINATION
[address] [facility] 250 Care Transitions Initial Follow Up Call    Call within 2 business days of discharge: Yes    Patient: David Perez Patient : 1950   MRN: [id number]  Reason for Admission: chest pain s/p CABG  Discharge Date: 23 RARS: Readmission Risk Score: 12.7      Last Discharge [address] [facility] [facility]       Date Complaint Diagnosis Description Type Department Provider    23 Chest Pain Chest pain, unspecified type . .. ED to Hosp-Admission (Discharged) (ADMITTED) Kate Sheth MD; Anthony Saldana. .. Attempted to reach patient via phone for initial post hospital transition call. VM left stating purpose of call along with my contact information requesting a return call. Left message with [facility] in regards to referral and awaiting return call.     Care Transitions 24 Hour Call    Care Transitions Interventions         Follow Up  Future Appointments   Date Time Provider [address]   2023  2:00 PM [facility]   2023 11:15 AM Robbie Vanegas MD AND CT SURG [facility]   10/11/2023  1:30 PM Hansel Moreno APRN - MARIANO [place] [facility]   11/3/2023 12:00 PM SCHEDULE, MHAZ PFT MHAZ PFT [place]   11/15/2023  1:00 PM CHRIS Avitia  [place] - DYD   2023  1:00 PM MHA CT VCT MHAZ CT [place] [facility] Rad   2023 10:00 AM Laila Lewis MD AND PULM [facility]     Luc Freedman RN

## 2023-09-05 NOTE — TELEPHONE ENCOUNTER
Care Transitions Initial Follow Up Call    Outreach made within 2 business days of discharge: Yes    Patient: Ebony Manley Patient : 1950   MRN: [id number]  Reason for Admission: There are no discharge diagnoses documented for the most recent discharge. Discharge Date: 23       Spoke with: Patient    Discharge department/facility: [facility]    TCM Interactive Patient Contact:  Was patient able to fill all prescriptions: Yes  Was patient instructed to bring all medications to the follow-up visit: Yes  Is patient taking all medications as directed in the discharge summary?  Yes  Does patient understand their discharge instructions: Yes  Does patient have questions or concerns that need addressed prior to 7-14 day follow up office visit: no    Scheduled appointment with PCP within 7-14 days    Follow Up  Future Appointments   Date Time Provider [facility] [address]   2023  2:00 PM CHRIS Mckeon  [place] - DYD   2023 11:15 AM Chris Garg MD AND CT SURG [facility]   10/11/2023  1:30 PM NEWTON Day - CNP [place] Car [facility]   11/3/2023 12:00 PM SCHEDULE, MHAZ PFT MHAZ PFT [facility] HOD   11/15/2023  1:00 PM CHRIS Mckeon [place] - DYD   2023  1:00 PM MHA CT VCT MHAZ CT [facility] Rad   2023 10:00 AM Jo Marie MD AND PULM [facility]       Roxan Lennox, LPN

## 2023-09-06 ENCOUNTER — CARE COORDINATION (OUTPATIENT)
Dept: CASE MANAGEMENT | Age: 73
End: 2023-09-06

## 2023-09-06 DIAGNOSIS — Z95.1 S/P CABG (CORONARY ARTERY BYPASS GRAFT): Primary | ICD-10-CM

## 2023-09-06 PROCEDURE — 1111F DSCHRG MED/CURRENT MED MERGE: CPT | Performed by: PHYSICIAN ASSISTANT

## 2023-09-06 NOTE — CARE COORDINATION
assistance is needed after hours. Care Transition Nurse reviewed discharge instructions and medical action plan with patient who verbalized understanding. The patient was given an opportunity to ask questions and does not have any further questions or concerns at this time. Were discharge instructions available to patient? Yes. Reviewed appropriate site of care based on symptoms and resources available to patient including: PCP  Specialist  When to call 911. The patient agrees to contact the PCP office for questions related to their healthcare. Advance Care Planning:   Does patient have an Advance Directive: not on file. Medication reconciliation was performed with patient, who verbalizes understanding of administration of home medications. Medications reviewed, 1111F entered: yes    Was patient discharged with a pulse oximeter? no    Non-face-to-face services provided:  Obtained and reviewed discharge summary and/or continuity of care documents    Offered patient enrollment in the Remote Patient Monitoring (RPM) program for in-home monitoring:  did not discuss at initial call . Care Transitions 24 Hour Call    Care Transitions Interventions         Discussed follow-up appointments. If no appointment was previously scheduled, appointment scheduling offered: Yes. Is follow up appointment scheduled within 7 days of discharge? Yes. Follow Up  Future Appointments   Date Time Provider [address]   9/11/2023  2:00 PM CHRIS Avitia  [place] - DYD   9/14/2023 11:15 AM Robbie Vanegas MD AND CT SURG [facility]   10/11/2023  1:30 PM NEWTON Etienne - MARIANO [place] [facility]   11/3/2023 12:00 PM SCHEDULE, MHAZ PFT MHAZ PFT [place]   11/15/2023  1:00 PM CHRIS Avitia  [place] - DYD   11/24/2023  1:00 PM MHA CT VCT MHAZ CT [facility]   12/1/2023 10:00 AM Laila Lewis MD AND [facility]       Care Transition Nurse provided contact information.   Plan for follow-up call in

## 2023-09-11 ENCOUNTER — OFFICE VISIT (OUTPATIENT)
Dept: FAMILY MEDICINE CLINIC | Age: 73
End: 2023-09-11

## 2023-09-11 VITALS
OXYGEN SATURATION: 98 % | WEIGHT: 181.4 LBS | DIASTOLIC BLOOD PRESSURE: 56 MMHG | TEMPERATURE: 98.2 F | BODY MASS INDEX: 27.49 KG/M2 | SYSTOLIC BLOOD PRESSURE: 142 MMHG | HEIGHT: 68 IN | HEART RATE: 87 BPM

## 2023-09-11 DIAGNOSIS — I25.810 CORONARY ARTERY DISEASE INVOLVING CORONARY BYPASS GRAFT OF NATIVE HEART WITHOUT ANGINA PECTORIS: Primary | ICD-10-CM

## 2023-09-11 DIAGNOSIS — N18.9 ACUTE KIDNEY INJURY SUPERIMPOSED ON CKD (HCC): ICD-10-CM

## 2023-09-11 DIAGNOSIS — Z09 HOSPITAL DISCHARGE FOLLOW-UP: ICD-10-CM

## 2023-09-11 DIAGNOSIS — Z95.1 S/P CABG (CORONARY ARTERY BYPASS GRAFT): ICD-10-CM

## 2023-09-11 DIAGNOSIS — N17.9 ACUTE KIDNEY INJURY SUPERIMPOSED ON CKD (HCC): ICD-10-CM

## 2023-09-11 DIAGNOSIS — I48.91 ATRIAL FIBRILLATION WITH RVR (HCC): ICD-10-CM

## 2023-09-11 DIAGNOSIS — I25.810 CORONARY ARTERY DISEASE INVOLVING CORONARY BYPASS GRAFT OF NATIVE HEART WITHOUT ANGINA PECTORIS: ICD-10-CM

## 2023-09-12 LAB
ALBUMIN SERPL-MCNC: 4.2 G/DL (ref 3.4–5)
ANION GAP SERPL CALCULATED.3IONS-SCNC: 12 MMOL/L (ref 3–16)
BUN SERPL-MCNC: 31 MG/DL (ref 7–20)
CALCIUM SERPL-MCNC: 10.1 MG/DL (ref 8.3–10.6)
CHLORIDE SERPL-SCNC: 104 MMOL/L (ref 99–110)
CO2 SERPL-SCNC: 25 MMOL/L (ref 21–32)
CREAT SERPL-MCNC: 1.2 MG/DL (ref 0.8–1.3)
DEPRECATED RDW RBC AUTO: 18.2 % (ref 12.4–15.4)
GFR SERPLBLD CREATININE-BSD FMLA CKD-EPI: >60 ML/MIN/{1.73_M2}
GLUCOSE SERPL-MCNC: 112 MG/DL (ref 70–99)
HCT VFR BLD AUTO: 38 % (ref 40.5–52.5)
HGB BLD-MCNC: 12.2 G/DL (ref 13.5–17.5)
MCH RBC QN AUTO: 26.5 PG (ref 26–34)
MCHC RBC AUTO-ENTMCNC: 32 G/DL (ref 31–36)
MCV RBC AUTO: 82.7 FL (ref 80–100)
PHOSPHATE SERPL-MCNC: 4.5 MG/DL (ref 2.5–4.9)
PLATELET # BLD AUTO: 391 K/UL (ref 135–450)
PMV BLD AUTO: 8.1 FL (ref 5–10.5)
POTASSIUM SERPL-SCNC: 4.7 MMOL/L (ref 3.5–5.1)
RBC # BLD AUTO: 4.6 M/UL (ref 4.2–5.9)
SODIUM SERPL-SCNC: 141 MMOL/L (ref 136–145)
WBC # BLD AUTO: 11.3 K/UL (ref 4–11)

## 2023-09-15 ENCOUNTER — CARE COORDINATION (OUTPATIENT)
Dept: CASE MANAGEMENT | Age: 73
End: 2023-09-15

## 2023-09-18 NOTE — TELEPHONE ENCOUNTER
Refill Request     CONFIRM preferred pharmacy with the patient. If Mail Order Rx - Pend for 90 day refill. Last Seen: Last Seen Department: 9/11/2023  Last Seen by PCP: 9/11/2023    Last Written: 11/14/2022, #18, 5 refills    If no future appointment scheduled:  Review the last OV with PCP and review information for follow-up visit,  Route STAFF MESSAGE with patient name to the [facility] for scheduling with the following information:            -  Timing of next visit           -  Visit type ie Physical, OV, etc           -  Diagnoses/Reason ie. COPD, HTN - Do not use MEDICATION, Follow-up or CHECK UP - Give reason for visit      Next Appointment:   Future Appointments   Date Time Provider [address]   10/11/2023  1:30 PM NEWTON Busby - CNP [facility]   11/3/2023 12:00 PM SCHEDULE, MHAZ PFT MHAZ PFT [facility]   11/13/2023  1:00 PM CHRIS Azar [place] - DYD   11/15/2023  1:00 PM CHRIS Azar [place] - DYD   11/24/2023  1:00 PM MHA CT VCT MHAZ CT [facility] Rad   12/1/2023 10:00 AM Krystin Cloud MD AND PULM [facility]       Message sent to  to schedule appt with patient?   N/A      Requested Prescriptions     Pending Prescriptions Disp Refills    albuterol sulfate HFA (VENTOLIN HFA) 108 (90 Base) MCG/ACT inhaler 18 g 5     Sig: Inhale 2 puffs into the lungs 4 times daily as needed for Wheezing

## 2023-09-20 RX ORDER — ALBUTEROL SULFATE 90 UG/1
2 AEROSOL, METERED RESPIRATORY (INHALATION) 4 TIMES DAILY PRN
Qty: 18 G | Refills: 5 | Status: SHIPPED | OUTPATIENT
Start: 2023-09-20

## 2023-09-22 ENCOUNTER — CARE COORDINATION (OUTPATIENT)
Dept: CASE MANAGEMENT | Age: 73
End: 2023-09-22

## 2023-09-29 ENCOUNTER — CARE COORDINATION (OUTPATIENT)
Dept: CASE MANAGEMENT | Age: 73
End: 2023-09-29

## 2023-09-29 RX ORDER — CLOPIDOGREL BISULFATE 75 MG/1
75 TABLET ORAL DAILY
Qty: 90 TABLET | Refills: 2 | Status: SHIPPED | OUTPATIENT
Start: 2023-09-29

## 2023-09-29 RX ORDER — ATORVASTATIN CALCIUM 40 MG/1
40 TABLET, FILM COATED ORAL NIGHTLY
Qty: 90 TABLET | Refills: 2 | Status: SHIPPED | OUTPATIENT
Start: 2023-09-29

## 2023-09-29 NOTE — TELEPHONE ENCOUNTER
Last OV:8/23/2023  Future OV:10/11/2023  Cbc 9/11/2023  Bmp 9/1/2023  Lipid 8/22/2023  LFT 9/11/2023

## 2023-10-06 ENCOUNTER — CARE COORDINATION (OUTPATIENT)
Dept: CASE MANAGEMENT | Age: 73
End: 2023-10-06

## 2023-10-11 ENCOUNTER — TELEPHONE (OUTPATIENT)
Dept: CARDIOLOGY CLINIC | Age: 73
End: 2023-10-11

## 2023-10-11 ENCOUNTER — OFFICE VISIT (OUTPATIENT)
Dept: CARDIOLOGY CLINIC | Age: 73
End: 2023-10-11
Payer: MEDICARE

## 2023-10-11 VITALS
WEIGHT: 181 LBS | HEIGHT: 68 IN | DIASTOLIC BLOOD PRESSURE: 66 MMHG | HEART RATE: 85 BPM | BODY MASS INDEX: 27.43 KG/M2 | OXYGEN SATURATION: 96 % | SYSTOLIC BLOOD PRESSURE: 138 MMHG

## 2023-10-11 DIAGNOSIS — E78.2 MIXED HYPERLIPIDEMIA: ICD-10-CM

## 2023-10-11 DIAGNOSIS — J43.9 PULMONARY EMPHYSEMA, UNSPECIFIED EMPHYSEMA TYPE (HCC): ICD-10-CM

## 2023-10-11 DIAGNOSIS — I25.10 CORONARY ARTERY DISEASE INVOLVING NATIVE CORONARY ARTERY OF NATIVE HEART WITHOUT ANGINA PECTORIS: Primary | ICD-10-CM

## 2023-10-11 DIAGNOSIS — Z95.1 S/P CABG (CORONARY ARTERY BYPASS GRAFT): ICD-10-CM

## 2023-10-11 DIAGNOSIS — C67.9 MALIGNANT NEOPLASM OF URINARY BLADDER, UNSPECIFIED SITE (HCC): ICD-10-CM

## 2023-10-11 DIAGNOSIS — I73.9 CLAUDICATION IN PERIPHERAL VASCULAR DISEASE (HCC): ICD-10-CM

## 2023-10-11 DIAGNOSIS — E05.90 HYPERTHYROIDISM: ICD-10-CM

## 2023-10-11 DIAGNOSIS — I10 PRIMARY HYPERTENSION: ICD-10-CM

## 2023-10-11 DIAGNOSIS — N18.31 STAGE 3A CHRONIC KIDNEY DISEASE (HCC): ICD-10-CM

## 2023-10-11 PROCEDURE — G8427 DOCREV CUR MEDS BY ELIG CLIN: HCPCS | Performed by: NURSE PRACTITIONER

## 2023-10-11 PROCEDURE — 3017F COLORECTAL CA SCREEN DOC REV: CPT | Performed by: NURSE PRACTITIONER

## 2023-10-11 PROCEDURE — 1124F ACP DISCUSS-NO DSCNMKR DOCD: CPT | Performed by: NURSE PRACTITIONER

## 2023-10-11 PROCEDURE — 3023F SPIROM DOC REV: CPT | Performed by: NURSE PRACTITIONER

## 2023-10-11 PROCEDURE — 3078F DIAST BP <80 MM HG: CPT | Performed by: NURSE PRACTITIONER

## 2023-10-11 PROCEDURE — G8417 CALC BMI ABV UP PARAM F/U: HCPCS | Performed by: NURSE PRACTITIONER

## 2023-10-11 PROCEDURE — 1036F TOBACCO NON-USER: CPT | Performed by: NURSE PRACTITIONER

## 2023-10-11 PROCEDURE — 99214 OFFICE O/P EST MOD 30 MIN: CPT | Performed by: NURSE PRACTITIONER

## 2023-10-11 PROCEDURE — G8484 FLU IMMUNIZE NO ADMIN: HCPCS | Performed by: NURSE PRACTITIONER

## 2023-10-11 PROCEDURE — 3074F SYST BP LT 130 MM HG: CPT | Performed by: NURSE PRACTITIONER

## 2023-10-11 RX ORDER — METHOCARBAMOL 750 MG/1
750 TABLET, FILM COATED ORAL 4 TIMES DAILY
COMMUNITY

## 2023-10-11 RX ORDER — METOPROLOL SUCCINATE 50 MG/1
50 TABLET, EXTENDED RELEASE ORAL DAILY
Qty: 90 TABLET | Refills: 1 | Status: SHIPPED | OUTPATIENT
Start: 2023-10-11

## 2023-10-11 NOTE — TELEPHONE ENCOUNTER
Monitor placed by [facility]  Length of monitor 2 weeks  Monitor ordered by [facility]  Phone ID [email or web address]  First Patch ID [id number]  Activation successful prior to pt leaving office?  Yes

## 2023-10-11 NOTE — PATIENT INSTRUCTIONS
2 week event monitor  Echocardiogram around 11/22/2023 - call LA NENA ([phone]) to schedule   No change in medicines - will considers stopping the amiodarone if no atrial arrhythmia found on monitor  Referral to cardiac rehab phase II  Change the metoprolol tartrate to metoprolol succinate 50 mg once daily  Will call you with results of the echocardiogram and heart monitor and new recommendations  Follow up with Dr. Mk Hartman in 3 months

## 2023-10-11 NOTE — PROGRESS NOTES
(LIPITOR) 40 MG tablet Take 1 tablet by mouth nightly 9/29/23  Yes Enzweiler, Diane M, APRN - CNP   albuterol sulfate HFA (VENTOLIN HFA) 108 (90 Base) MCG/ACT inhaler Inhale 2 puffs into the lungs 4 times daily as needed for Wheezing 9/20/23  Yes CHRIS Garcia   amiodarone (PACERONE) 200 MG tablet Take 1 tablet by mouth daily 9/14/23  Yes Primitivo Hermosillo MD   metoprolol tartrate (LOPRESSOR) 25 MG tablet Take 1 tablet by mouth 2 times daily 9/14/23  Yes Primitivo Hermosillo MD   aspirin 81 MG EC tablet Take 1 tablet by mouth daily 9/1/23  Yes NEWTON Garcia CNP   famotidine (PEPCID) 20 MG tablet Take 1 tablet by mouth daily 9/1/23 10/11/23 Yes NEWTON Garcia CNP   methIMAzole (TAPAZOLE) 5 MG tablet Take 1 tablet by mouth daily   Yes Provider, MD Annia   fluticasone-umeclidin-vilant (TRELEGY ELLIPTA) 200-62.5-25 MCG/ACT AEPB inhaler Inhale 1 puff into the lungs daily 8/18/23  Yes Nick Ortiz MD   sertraline (ZOLOFT) 100 MG tablet Take 1 tablet by mouth once daily 12/21/22  Yes CHRIS Garcia   oxybutynin (DITROPAN) 5 MG tablet TAKE 1 TABLET BY MOUTH TWICE DAILY 1/5/22  Yes ProviderAnnia MD   tamsulosin (FLOMAX) 0.4 MG capsule TAKE 1 CAPSULE BY MOUTH IN THE EVENING 12/29/21  Yes Provider, Historical, MD        Allergies:  Penicillins     Physical Examination:    Vitals:    10/11/23 1351   BP: 138/66   Pulse: 85   SpO2: 96%   Weight: 181 lb (82.1 kg)   Height: 5' 8\" (1.727 m)     Constitutional and General Appearance: Warm and dry, no apparent distress, normal coloration  HEENT:  Normocephalic, atraumatic  Respiratory:  Normal excursion and expansion without use of accessory muscles  Resp Auscultation: Clear to auscultation - no RRW  Cardiovascular: The apical impulses not displaced  Heart tones are crisp and normal  JVP 8 cm H2O  Regular rate and rhythm, normal S1S2, no m/g/r  Peripheral pulses are symmetrical and full  There is no clubbing, cyanosis of the extremities.   No

## 2023-11-03 ENCOUNTER — HOSPITAL ENCOUNTER (OUTPATIENT)
Dept: CARDIAC REHAB | Age: 73
Setting detail: THERAPIES SERIES
Discharge: HOME | End: 2023-11-03
Payer: MEDICARE

## 2023-11-06 ENCOUNTER — HOSPITAL ENCOUNTER (OUTPATIENT)
Dept: CARDIAC REHAB | Age: 73
Setting detail: THERAPIES SERIES
Discharge: HOME | End: 2023-11-06
Payer: MEDICARE

## 2023-11-06 PROCEDURE — 93798 PHYS/QHP OP CAR RHAB W/ECG: CPT

## 2023-11-08 ENCOUNTER — HOSPITAL ENCOUNTER (OUTPATIENT)
Dept: CARDIAC REHAB | Age: 73
Setting detail: THERAPIES SERIES
Discharge: HOME | End: 2023-11-08
Payer: MEDICARE

## 2023-11-08 PROCEDURE — 93798 PHYS/QHP OP CAR RHAB W/ECG: CPT

## 2023-11-10 ENCOUNTER — HOSPITAL ENCOUNTER (OUTPATIENT)
Dept: CARDIAC REHAB | Age: 73
Setting detail: THERAPIES SERIES
Discharge: HOME | End: 2023-11-10
Payer: MEDICARE

## 2023-11-10 PROCEDURE — 93798 PHYS/QHP OP CAR RHAB W/ECG: CPT

## 2023-11-13 ENCOUNTER — HOSPITAL ENCOUNTER (OUTPATIENT)
Dept: CARDIAC REHAB | Age: 73
Setting detail: THERAPIES SERIES
Discharge: HOME | End: 2023-11-13
Payer: MEDICARE

## 2023-11-13 PROCEDURE — 93798 PHYS/QHP OP CAR RHAB W/ECG: CPT

## 2023-11-15 ENCOUNTER — HOSPITAL ENCOUNTER (OUTPATIENT)
Dept: CARDIAC REHAB | Age: 73
Setting detail: THERAPIES SERIES
Discharge: HOME | End: 2023-11-15
Payer: MEDICARE

## 2023-11-15 ENCOUNTER — OFFICE VISIT (OUTPATIENT)
Dept: FAMILY MEDICINE CLINIC | Age: 73
End: 2023-11-15

## 2023-11-15 VITALS
WEIGHT: 177.8 LBS | BODY MASS INDEX: 26.95 KG/M2 | SYSTOLIC BLOOD PRESSURE: 126 MMHG | HEART RATE: 67 BPM | OXYGEN SATURATION: 97 % | HEIGHT: 68 IN | TEMPERATURE: 97.6 F | DIASTOLIC BLOOD PRESSURE: 60 MMHG

## 2023-11-15 DIAGNOSIS — Z00.00 MEDICARE ANNUAL WELLNESS VISIT, SUBSEQUENT: Primary | ICD-10-CM

## 2023-11-15 DIAGNOSIS — Z23 NEED FOR INFLUENZA VACCINATION: ICD-10-CM

## 2023-11-15 DIAGNOSIS — E11.9 TYPE 2 DIABETES MELLITUS WITHOUT COMPLICATION, UNSPECIFIED WHETHER LONG TERM INSULIN USE (HCC): ICD-10-CM

## 2023-11-15 PROCEDURE — 93798 PHYS/QHP OP CAR RHAB W/ECG: CPT

## 2023-11-15 SDOH — HEALTH STABILITY: PHYSICAL HEALTH: ON AVERAGE, HOW MANY DAYS PER WEEK DO YOU ENGAGE IN MODERATE TO STRENUOUS EXERCISE (LIKE A BRISK WALK)?: 3 DAYS

## 2023-11-15 SDOH — ECONOMIC STABILITY: FOOD INSECURITY: WITHIN THE PAST 12 MONTHS, YOU WORRIED THAT YOUR FOOD WOULD RUN OUT BEFORE YOU GOT MONEY TO BUY MORE.: NEVER TRUE

## 2023-11-15 SDOH — ECONOMIC STABILITY: INCOME INSECURITY: HOW HARD IS IT FOR YOU TO PAY FOR THE VERY BASICS LIKE FOOD, HOUSING, MEDICAL CARE, AND HEATING?: NOT HARD AT ALL

## 2023-11-15 SDOH — HEALTH STABILITY: PHYSICAL HEALTH: ON AVERAGE, HOW MANY MINUTES DO YOU ENGAGE IN EXERCISE AT THIS LEVEL?: 40 MIN

## 2023-11-15 SDOH — ECONOMIC STABILITY: FOOD INSECURITY: WITHIN THE PAST 12 MONTHS, THE FOOD YOU BOUGHT JUST DIDN'T LAST AND YOU DIDN'T HAVE MONEY TO GET MORE.: NEVER TRUE

## 2023-11-15 ASSESSMENT — PATIENT HEALTH QUESTIONNAIRE - PHQ9
SUM OF ALL RESPONSES TO PHQ QUESTIONS 1-9: 0
10. IF YOU CHECKED OFF ANY PROBLEMS, HOW DIFFICULT HAVE THESE PROBLEMS MADE IT FOR YOU TO DO YOUR WORK, TAKE CARE OF THINGS AT HOME, OR GET ALONG WITH OTHER PEOPLE: 0
3. TROUBLE FALLING OR STAYING ASLEEP: 0
SUM OF ALL RESPONSES TO PHQ QUESTIONS 1-9: 0
2. FEELING DOWN, DEPRESSED OR HOPELESS: 0
SUM OF ALL RESPONSES TO PHQ QUESTIONS 1-9: 0
7. TROUBLE CONCENTRATING ON THINGS, SUCH AS READING THE NEWSPAPER OR WATCHING TELEVISION: 0
5. POOR APPETITE OR OVEREATING: 0
4. FEELING TIRED OR HAVING LITTLE ENERGY: 0
6. FEELING BAD ABOUT YOURSELF - OR THAT YOU ARE A FAILURE OR HAVE LET YOURSELF OR YOUR FAMILY DOWN: 0
9. THOUGHTS THAT YOU WOULD BE BETTER OFF DEAD, OR OF HURTING YOURSELF: 0
SUM OF ALL RESPONSES TO PHQ9 QUESTIONS 1 & 2: 0
SUM OF ALL RESPONSES TO PHQ QUESTIONS 1-9: 0
8. MOVING OR SPEAKING SO SLOWLY THAT OTHER PEOPLE COULD HAVE NOTICED. OR THE OPPOSITE, BEING SO FIGETY OR RESTLESS THAT YOU HAVE BEEN MOVING AROUND A LOT MORE THAN USUAL: 0
1. LITTLE INTEREST OR PLEASURE IN DOING THINGS: 0

## 2023-11-15 ASSESSMENT — LIFESTYLE VARIABLES
HOW OFTEN DO YOU HAVE A DRINK CONTAINING ALCOHOL: NEVER
HOW MANY STANDARD DRINKS CONTAINING ALCOHOL DO YOU HAVE ON A TYPICAL DAY: 0
HOW OFTEN DO YOU HAVE A DRINK CONTAINING ALCOHOL: 1
HOW MANY STANDARD DRINKS CONTAINING ALCOHOL DO YOU HAVE ON A TYPICAL DAY: PATIENT DOES NOT DRINK
HOW OFTEN DO YOU HAVE SIX OR MORE DRINKS ON ONE OCCASION: 1

## 2023-11-15 NOTE — PROGRESS NOTES
lymphadenopathy  Pulmonary/Chest: clear to auscultation bilaterally- no wheezes, rales or rhonchi, normal air movement, no respiratory distress  Cardiovascular: normal rate, regular rhythm, normal S1 and S2, no murmurs, rubs, clicks, or gallops, distal pulses intact, no carotid bruits  Abdomen: soft, non-tender, non-distended, normal bowel sounds, no masses or organomegaly  Extremities: no cyanosis, clubbing or edema  Musculoskeletal: normal range of motion, no joint swelling, deformity or tenderness  Neurologic: reflexes normal and symmetric, no cranial nerve deficit, gait, coordination and speech normal       Allergies   Allergen Reactions    Penicillins Other (See Comments)     UNKNOWN, CHILD     Prior to Visit Medications    Medication Sig Taking?  Authorizing Provider   metoprolol succinate (TOPROL XL) 50 MG extended release tablet Take 1 tablet by mouth daily Yes Rachael Marshall APRN - CNP   clopidogrel (PLAVIX) 75 MG tablet Take 1 tablet by mouth daily Yes Enzweiler, Diane M, APRN - CNP   atorvastatin (LIPITOR) 40 MG tablet Take 1 tablet by mouth nightly Yes Enzweiler, Diane M, APRN - CNP   albuterol sulfate HFA (VENTOLIN HFA) 108 (90 Base) MCG/ACT inhaler Inhale 2 puffs into the lungs 4 times daily as needed for Wheezing Yes CHRIS Jacob   aspirin 81 MG EC tablet Take 1 tablet by mouth daily Yes Rosana Spears APRN - CNP   methIMAzole (TAPAZOLE) 5 MG tablet Take 1 tablet by mouth daily Yes Annia Phillips MD   fluticasone-umeclidin-vilant (TRELEGY ELLIPTA) 200-62.5-25 MCG/ACT AEPB inhaler Inhale 1 puff into the lungs daily Yes Inderjit Olea MD   sertraline (ZOLOFT) 100 MG tablet Take 1 tablet by mouth once daily Yes Mallory Gilbert PA   oxybutynin (DITROPAN) 5 MG tablet TAKE 1 TABLET BY MOUTH TWICE DAILY Yes Annia Phillips MD   tamsulosin (FLOMAX) 0.4 MG capsule TAKE 1 CAPSULE BY MOUTH IN THE EVENING Yes Annia Phillips MD   famotidine (PEPCID) 20 MG tablet Take 1 tablet by

## 2023-11-16 ENCOUNTER — HOSPITAL ENCOUNTER (OUTPATIENT)
Dept: CARDIOLOGY | Age: 73
Discharge: HOME | End: 2023-11-16
Payer: MEDICARE

## 2023-11-16 DIAGNOSIS — I73.9 CLAUDICATION IN PERIPHERAL VASCULAR DISEASE (HCC): ICD-10-CM

## 2023-11-16 DIAGNOSIS — I45.10 RIGHT BUNDLE BRANCH BLOCK: Primary | ICD-10-CM

## 2023-11-16 DIAGNOSIS — I10 PRIMARY HYPERTENSION: ICD-10-CM

## 2023-11-16 DIAGNOSIS — E78.2 MIXED HYPERLIPIDEMIA: ICD-10-CM

## 2023-11-16 DIAGNOSIS — Z95.1 S/P CABG (CORONARY ARTERY BYPASS GRAFT): ICD-10-CM

## 2023-11-16 DIAGNOSIS — I25.10 CORONARY ARTERY DISEASE INVOLVING NATIVE CORONARY ARTERY OF NATIVE HEART WITHOUT ANGINA PECTORIS: ICD-10-CM

## 2023-11-16 PROCEDURE — 93306 TTE W/DOPPLER COMPLETE: CPT

## 2023-11-17 ENCOUNTER — TELEPHONE (OUTPATIENT)
Dept: CARDIOLOGY CLINIC | Age: 73
End: 2023-11-17

## 2023-11-17 ENCOUNTER — HOSPITAL ENCOUNTER (OUTPATIENT)
Dept: CARDIAC REHAB | Age: 73
Setting detail: THERAPIES SERIES
Discharge: HOME | End: 2023-11-17
Payer: MEDICARE

## 2023-11-17 PROCEDURE — 93798 PHYS/QHP OP CAR RHAB W/ECG: CPT

## 2023-11-17 NOTE — TELEPHONE ENCOUNTER
----- Message from NEWTON Luciano CNP sent at 11/17/2023  7:11 AM EST -----  Echo with normal heart function (improved from 40-45% Aug 2023). Good news. No new recommendations.    Thank you, Magen Dinero

## 2023-11-17 NOTE — TELEPHONE ENCOUNTER
----- Message from NEWTON Davis CNP sent at 11/16/2023  3:56 PM EST -----  No afib by event monitor. Okay to stop the amiodarone. Echocardiogram pending.    NEWTON Davis CNP

## 2023-11-20 ENCOUNTER — HOSPITAL ENCOUNTER (OUTPATIENT)
Dept: CARDIAC REHAB | Age: 73
Setting detail: THERAPIES SERIES
Discharge: HOME | End: 2023-11-20
Payer: MEDICARE

## 2023-11-20 PROCEDURE — 93798 PHYS/QHP OP CAR RHAB W/ECG: CPT

## 2023-11-22 ENCOUNTER — HOSPITAL ENCOUNTER (OUTPATIENT)
Dept: CARDIAC REHAB | Age: 73
Setting detail: THERAPIES SERIES
Discharge: HOME | End: 2023-11-22
Payer: MEDICARE

## 2023-11-22 PROCEDURE — 93798 PHYS/QHP OP CAR RHAB W/ECG: CPT

## 2023-11-24 ENCOUNTER — APPOINTMENT (OUTPATIENT)
Dept: CARDIAC REHAB | Age: 73
End: 2023-11-24
Payer: MEDICARE

## 2023-11-24 ENCOUNTER — HOSPITAL ENCOUNTER (OUTPATIENT)
Dept: CT IMAGING | Age: 73
Discharge: HOME | End: 2023-11-24
Payer: MEDICARE

## 2023-11-24 DIAGNOSIS — Z87.891 HISTORY OF TOBACCO ABUSE: ICD-10-CM

## 2023-11-24 DIAGNOSIS — R91.1 LUNG NODULE: ICD-10-CM

## 2023-11-24 PROCEDURE — 71250 CT THORAX DX C-: CPT

## 2023-11-27 ENCOUNTER — HOSPITAL ENCOUNTER (OUTPATIENT)
Dept: CARDIAC REHAB | Age: 73
Setting detail: THERAPIES SERIES
Discharge: HOME | End: 2023-11-27
Payer: MEDICARE

## 2023-11-27 PROCEDURE — 93798 PHYS/QHP OP CAR RHAB W/ECG: CPT

## 2023-11-29 ENCOUNTER — HOSPITAL ENCOUNTER (OUTPATIENT)
Dept: CARDIAC REHAB | Age: 73
Setting detail: THERAPIES SERIES
Discharge: HOME | End: 2023-11-29
Payer: MEDICARE

## 2023-11-29 PROCEDURE — 93798 PHYS/QHP OP CAR RHAB W/ECG: CPT

## 2023-11-30 ENCOUNTER — TELEPHONE (OUTPATIENT)
Dept: PULMONOLOGY | Age: 73
End: 2023-11-30

## 2023-11-30 NOTE — TELEPHONE ENCOUNTER
Patient called the office requesting to reschedule his appointment that was scheduled on 12/01/2023 because he recently had heart surgery. He was rescheduled for 03/22/2024 but he is requesting to know the results of his recent CT. He was offered an appointment to see Ramonita Cheadle prior to March but he declined to schedule with her. Please advise.

## 2023-11-30 NOTE — TELEPHONE ENCOUNTER
The results of the CAT scan are the following  FINDINGS:  Mediastinum: Status post median sternotomy, CABG and left atrial appendage  clip. There are no enlarged thoracic lymph nodes. Calcified granulomatous  disease is noted. No change in the heterogeneous thyroid gland. Lungs/pleura: The tracheobronchial tree is patent. There is no pneumothorax  or pleural effusion. Moderate emphysema involves the bilateral upper lungs  with biapical and bibasilar scarring. The previously noted 6 mm right upper lobe pulmonary nodule has completely  resolved. No change in the 2-4 mm solid right pulmonary nodules 3 mm solid  left upper lobe pulmonary nodule. Upper Abdomen: A small hiatal hernia is noted. No change in the multiple low  attenuating lesions throughout the liver favoring benign cysts. No change in  the complex hyperdense bilateral renal cysts. Soft Tissues/Bones: Degenerative changes involve the thoracic spine. IMPRESSION:  1. Complete resolution of the previously noted 6 mm right upper lobe  pulmonary nodule. 2. New solid and stable subcentimeter bilateral pulmonary nodules bear  attention on the next imaging cycle.          Please tell the patient that the lung nodule that we were following and seen in the hospital has resolved, however he has some  Lung nodules most likely secondary to infection, no reason to treat anything however just will watch with another repeat CAT scan in about 6 months from this CAT scan

## 2023-12-01 ENCOUNTER — HOSPITAL ENCOUNTER (OUTPATIENT)
Dept: CARDIAC REHAB | Age: 73
Setting detail: THERAPIES SERIES
Discharge: HOME | End: 2023-12-01
Payer: MEDICARE

## 2023-12-01 PROCEDURE — 93798 PHYS/QHP OP CAR RHAB W/ECG: CPT

## 2023-12-06 ENCOUNTER — HOSPITAL ENCOUNTER (OUTPATIENT)
Dept: CARDIAC REHAB | Age: 73
Setting detail: THERAPIES SERIES
Discharge: HOME | End: 2023-12-06
Payer: MEDICARE

## 2023-12-06 PROCEDURE — 93798 PHYS/QHP OP CAR RHAB W/ECG: CPT

## 2023-12-08 ENCOUNTER — HOSPITAL ENCOUNTER (OUTPATIENT)
Dept: CARDIAC REHAB | Age: 73
Setting detail: THERAPIES SERIES
Discharge: HOME | End: 2023-12-08
Payer: MEDICARE

## 2023-12-08 PROCEDURE — 93798 PHYS/QHP OP CAR RHAB W/ECG: CPT

## 2023-12-11 ENCOUNTER — HOSPITAL ENCOUNTER (OUTPATIENT)
Dept: CARDIAC REHAB | Age: 73
Setting detail: THERAPIES SERIES
Discharge: HOME | End: 2023-12-11
Payer: MEDICARE

## 2023-12-11 PROCEDURE — 93798 PHYS/QHP OP CAR RHAB W/ECG: CPT

## 2023-12-13 ENCOUNTER — HOSPITAL ENCOUNTER (OUTPATIENT)
Dept: CARDIAC REHAB | Age: 73
Setting detail: THERAPIES SERIES
Discharge: HOME | End: 2023-12-13
Payer: MEDICARE

## 2023-12-13 PROCEDURE — 93798 PHYS/QHP OP CAR RHAB W/ECG: CPT

## 2023-12-15 ENCOUNTER — HOSPITAL ENCOUNTER (OUTPATIENT)
Dept: CARDIAC REHAB | Age: 73
Setting detail: THERAPIES SERIES
Discharge: HOME | End: 2023-12-15
Payer: MEDICARE

## 2023-12-15 PROCEDURE — 93798 PHYS/QHP OP CAR RHAB W/ECG: CPT

## 2023-12-15 NOTE — TELEPHONE ENCOUNTER
Wife called and said Veronica is working for her  and they would like a script sent to [facility] (pended below). If a colonoscopy was performed you might notice a few drops of blood on your underwear or you might see blood on the toilet paper after you use the bathroom. This is caused by irritation to the bowel during the procedure and is not a problem. However if you have any more heavy bleeding (more than 2 tablespoons of bright red blood) contact your doctor right away.

## 2023-12-27 ENCOUNTER — HOSPITAL ENCOUNTER (OUTPATIENT)
Dept: CARDIAC REHAB | Age: 73
Setting detail: THERAPIES SERIES
Discharge: HOME | End: 2023-12-27
Payer: MEDICARE

## 2023-12-27 PROCEDURE — 93798 PHYS/QHP OP CAR RHAB W/ECG: CPT

## 2023-12-29 ENCOUNTER — HOSPITAL ENCOUNTER (OUTPATIENT)
Dept: CARDIAC REHAB | Age: 73
Setting detail: THERAPIES SERIES
Discharge: HOME | End: 2023-12-29
Payer: MEDICARE

## 2023-12-29 PROCEDURE — 93798 PHYS/QHP OP CAR RHAB W/ECG: CPT

## 2024-02-01 RX ORDER — SERTRALINE HYDROCHLORIDE 100 MG/1
TABLET, FILM COATED ORAL
Qty: 90 TABLET | Refills: 3 | Status: SHIPPED | OUTPATIENT
Start: 2024-02-01

## 2024-02-01 NOTE — TELEPHONE ENCOUNTER
Refill Request     CONFIRM preferred pharmacy with the patient.    If Mail Order Rx - Pend for 90 day refill.      Last Seen: Last Seen Department: 11/15/2023  Last Seen by PCP: 11/15/2023    Last Written: 12/21/2022 90 tab 3 refills     If no future appointment scheduled:  Review the last OV with PCP and review information for follow-up visit,  Route STAFF MESSAGE with patient name to the  Pool for scheduling with the following information:            -  Timing of next visit           -  Visit type ie Physical, OV, etc           -  Diagnoses/Reason ie. COPD, HTN - Do not use MEDICATION, Follow-up or CHECK UP - Give reason for visit      Next Appointment:   Future Appointments   Date Time Provider Department Center   2/13/2024 11:00 AM Ander Goodrich MD [facility]   2/15/2024 10:00 AM Lise Salinas PA [facility]  [place] - DYD   3/22/2024 11:15 AM Antonio Worley MD AND [facility]       Message sent to  to schedule appt with patient?  NO      Requested Prescriptions     Pending Prescriptions Disp Refills    sertraline (ZOLOFT) 100 MG tablet 90 tablet 3     Sig: Take 1 tablet by mouth once daily

## 2024-02-15 ENCOUNTER — OFFICE VISIT (OUTPATIENT)
Dept: FAMILY MEDICINE CLINIC | Age: 74
End: 2024-02-15

## 2024-02-15 VITALS
DIASTOLIC BLOOD PRESSURE: 46 MMHG | WEIGHT: 175 LBS | SYSTOLIC BLOOD PRESSURE: 120 MMHG | OXYGEN SATURATION: 94 % | BODY MASS INDEX: 26.52 KG/M2 | HEART RATE: 74 BPM | HEIGHT: 68 IN

## 2024-02-15 DIAGNOSIS — J43.9 PULMONARY EMPHYSEMA, UNSPECIFIED EMPHYSEMA TYPE (HCC): ICD-10-CM

## 2024-02-15 DIAGNOSIS — I25.810 CORONARY ARTERY DISEASE INVOLVING CORONARY BYPASS GRAFT OF NATIVE HEART WITHOUT ANGINA PECTORIS: ICD-10-CM

## 2024-02-15 DIAGNOSIS — E11.9 TYPE 2 DIABETES MELLITUS WITHOUT COMPLICATION, UNSPECIFIED WHETHER LONG TERM INSULIN USE (HCC): Primary | ICD-10-CM

## 2024-02-15 DIAGNOSIS — E78.2 MIXED HYPERLIPIDEMIA: ICD-10-CM

## 2024-02-15 DIAGNOSIS — N18.31 STAGE 3A CHRONIC KIDNEY DISEASE (HCC): ICD-10-CM

## 2024-02-15 DIAGNOSIS — R05.8 POST-VIRAL COUGH SYNDROME: ICD-10-CM

## 2024-02-15 DIAGNOSIS — I73.9 CLAUDICATION IN PERIPHERAL VASCULAR DISEASE (HCC): ICD-10-CM

## 2024-02-15 DIAGNOSIS — I48.91 ATRIAL FIBRILLATION WITH RVR (HCC): ICD-10-CM

## 2024-02-15 DIAGNOSIS — E05.90 HYPERTHYROIDISM: ICD-10-CM

## 2024-02-15 DIAGNOSIS — Z95.1 S/P CABG (CORONARY ARTERY BYPASS GRAFT): ICD-10-CM

## 2024-02-15 DIAGNOSIS — E11.9 TYPE 2 DIABETES MELLITUS WITHOUT COMPLICATION, UNSPECIFIED WHETHER LONG TERM INSULIN USE (HCC): ICD-10-CM

## 2024-02-15 DIAGNOSIS — C67.9 MALIGNANT NEOPLASM OF URINARY BLADDER, UNSPECIFIED SITE (HCC): ICD-10-CM

## 2024-02-15 RX ORDER — BENZONATATE 100 MG/1
100 CAPSULE ORAL 3 TIMES DAILY PRN
Qty: 30 CAPSULE | Refills: 2 | Status: SHIPPED | OUTPATIENT
Start: 2024-02-15 | End: 2024-02-25

## 2024-02-15 NOTE — PROGRESS NOTES
2/15/2024  Eduardo Alicia (: 1950)  73 y.o.    ASSESSMENT and PLAN:  Eduardo was seen today for hypertension.    Diagnoses and all orders for this visit:    Type 2 diabetes mellitus without complication, unspecified whether long term insulin use (HCC)  -     CBC; Future  -     Hemoglobin A1C; Future  - not on treatment.   - last checked aug 2023, recheck today.     Coronary artery disease involving coronary bypass graft of native heart without angina pectoris  Mixed hyperlipidemia  S/P CABG (coronary artery bypass graft)  Atrial fibrillation with RVR (HCC)  -     CBC; Future  -     Comprehensive Metabolic Panel; Future  -     LIPID PANEL; Future    Pulmonary emphysema, unspecified emphysema type (HCC)  Post-viral cough syndrome  -     benzonatate (TESSALON) 100 MG capsule; Take 1 capsule by mouth 3 times daily as needed for Cough  - stable on prn inhaler, worsening cough since covid last night. Will send in tessalon. If sxs worsen, low threshold for abx. Pt to notify office with worsening sxs, will send in doxy.     Malignant neoplasm of urinary bladder, unspecified site (HCC)  - mgmt per urology, pt follows regularly.     Claudication in peripheral vascular disease (HCC)  - s/p left iliac artery stent with dr. Paniagua.  Last seen 2022  - told to follow up prn    Stage 3a chronic kidney disease (HCC)  - update labs today     Hyperthyroidism  - follows with [facility] endo- manages methimazole.   - labs utd.     Return in about 6 months (around 8/15/2024) for AWV.    Hypertension  Pertinent negatives include no chest pain, palpitations or shortness of breath.       CAD s/p CABG x3/afib. Following with cardiology. Current regimen includes plavix, statin, asa, metoprolol. Denies cp,soa, le swelling. Does have some mckinnon, feels it is at baseline.     Abnormal CT lung: following with pulmology, concerning nodule on initial screen resolved on repeat imaging, but noted new pulm nodules. Pulm recommended 6 month follow up

## 2024-02-16 DIAGNOSIS — R71.8 MICROCYTOSIS: Primary | ICD-10-CM

## 2024-02-16 LAB
ALBUMIN SERPL-MCNC: 3.8 G/DL (ref 3.4–5)
ALBUMIN/GLOB SERPL: 1.4 {RATIO} (ref 1.1–2.2)
ALP SERPL-CCNC: 89 U/L (ref 40–129)
ALT SERPL-CCNC: 11 U/L (ref 10–40)
ANION GAP SERPL CALCULATED.3IONS-SCNC: 13 MMOL/L (ref 3–16)
AST SERPL-CCNC: 13 U/L (ref 15–37)
BILIRUB SERPL-MCNC: 0.4 MG/DL (ref 0–1)
BUN SERPL-MCNC: 22 MG/DL (ref 7–20)
CALCIUM SERPL-MCNC: 8.8 MG/DL (ref 8.3–10.6)
CHLORIDE SERPL-SCNC: 103 MMOL/L (ref 99–110)
CHOLEST SERPL-MCNC: 113 MG/DL (ref 0–199)
CO2 SERPL-SCNC: 25 MMOL/L (ref 21–32)
CREAT SERPL-MCNC: 1.4 MG/DL (ref 0.8–1.3)
DEPRECATED RDW RBC AUTO: 20.2 % (ref 12.4–15.4)
EST. AVERAGE GLUCOSE BLD GHB EST-MCNC: 131.2 MG/DL
GFR SERPLBLD CREATININE-BSD FMLA CKD-EPI: 52 ML/MIN/{1.73_M2}
GLUCOSE SERPL-MCNC: 132 MG/DL (ref 70–99)
HBA1C MFR BLD: 6.2 %
HCT VFR BLD AUTO: 35.7 % (ref 40.5–52.5)
HDLC SERPL-MCNC: 24 MG/DL (ref 40–60)
HGB BLD-MCNC: 11.1 G/DL (ref 13.5–17.5)
LDLC SERPL CALC-MCNC: 62 MG/DL
MCH RBC QN AUTO: 21 PG (ref 26–34)
MCHC RBC AUTO-ENTMCNC: 31.1 G/DL (ref 31–36)
MCV RBC AUTO: 67.4 FL (ref 80–100)
PATH INTERP BLD-IMP: NORMAL
PATH INTERP BLD-IMP: YES
PLATELET # BLD AUTO: 156 K/UL (ref 135–450)
PMV BLD AUTO: 9.1 FL (ref 5–10.5)
POTASSIUM SERPL-SCNC: 3.4 MMOL/L (ref 3.5–5.1)
PROT SERPL-MCNC: 6.6 G/DL (ref 6.4–8.2)
RBC # BLD AUTO: 5.29 M/UL (ref 4.2–5.9)
SODIUM SERPL-SCNC: 141 MMOL/L (ref 136–145)
TRIGL SERPL-MCNC: 134 MG/DL (ref 0–150)
VLDLC SERPL CALC-MCNC: 27 MG/DL
WBC # BLD AUTO: 11.9 K/UL (ref 4–11)

## 2024-02-23 DIAGNOSIS — D50.9 IRON DEFICIENCY ANEMIA, UNSPECIFIED IRON DEFICIENCY ANEMIA TYPE: Primary | ICD-10-CM

## 2024-02-23 DIAGNOSIS — R71.8 MICROCYTOSIS: ICD-10-CM

## 2024-02-24 LAB
FERRITIN SERPL IA-MCNC: 31.5 NG/ML (ref 30–400)
IRON SATN MFR SERPL: 8 % (ref 20–50)
IRON SERPL-MCNC: 28 UG/DL (ref 59–158)
TIBC SERPL-MCNC: 346 UG/DL (ref 260–445)

## 2024-02-26 RX ORDER — FERROUS SULFATE 325(65) MG
325 TABLET ORAL
Qty: 90 TABLET | Refills: 1 | Status: SHIPPED | OUTPATIENT
Start: 2024-02-26

## 2024-03-08 ENCOUNTER — TELEPHONE (OUTPATIENT)
Dept: CARDIOLOGY CLINIC | Age: 74
End: 2024-03-08

## 2024-03-08 NOTE — TELEPHONE ENCOUNTER
Cardiac clearance request from [facility]     Date of procedure 4/24/24  Procedure cystoscopy  Anesthesia general  Requesting to hold Nothing, pt is on plavix 75mg and asa 81mg    LOV 10/11/2023  EKG 8/22/2023    [phone]

## 2024-03-11 NOTE — TELEPHONE ENCOUNTER
Called patient, spoke with Brianna per HIPAA form. Let her know clearance was granted and paper work faxed. Brianna SANTOS

## 2024-03-11 NOTE — PROGRESS NOTES
[facility]   CONSULTATION  [phone]      Attending Physician: Lise Salinas PA    Reason for Consultation/Chief Complaint: follow-up, CAD     Subjective   History of Present Illness:  Eduardo Alicia is a 73 y.o. patient who presents to office as a follow-up for CAD. Patient has past medical history of HLD, HTN, CAD, RBBB, S/B CABG. She presented to [facility] ED on 8/22/2023 for chest pain. Echo showed EF 40-45%, grade 1 diastolic dysfunction with normal filling pressure, mild MR.  She was admitted and underwent a cardiac cath on 8/23/2023 that showed, MV CAD/ ASHD, refer to CTS for consideration of CABG, can consider MV PCI. Discussed with CTS and plan for surgery. 8/28/2023 he underwent CABG x 3 with Dr. Monk.                 OV 10/11/2023 with Rachael Her NP, he denied palpitations since 9/14/2023. He reported sharp right sided chest pain. Reported being able to walk to the barn without SOB.                  Today he reports he feels great. He is going to have a bladder procedure in April, routine f/u cysto for h/o bladder ca. He reports taking medications as prescribed and tolerates well. Denies Shortness of breath, chest pain, palpitations, dizziness, syncope and edema.           Past Medical History:   has a past medical history of CAD (coronary artery disease), Cancer (HCC), COPD (chronic obstructive pulmonary disease) (HCC), Depression, Emphysema of lung (HCC), Heart attack (HCC), History of renal calculi, Hyperlipidemia, Hypertension, Hyperthyroidism, and RBBB.    Surgical History:   has a past surgical history that includes Cardiac surgery (2010); Colonoscopy (05/02/2014); Cystoscopy; Cystoscopy (Left, 10/22/2021); Coronary artery bypass graft (N/A, 08/28/2023); and Upper gastrointestinal endoscopy.     Social History:   reports that he quit smoking about 5 years ago. His smoking use included cigarettes. He started smoking about 50 years ago. He has a 45.0 pack-year smoking history. He

## 2024-03-11 NOTE — TELEPHONE ENCOUNTER
Letter created, fax sent to number provided. Successful scan received. Will call patient and let him know about clearance.

## 2024-03-12 ENCOUNTER — OFFICE VISIT (OUTPATIENT)
Dept: CARDIOLOGY CLINIC | Age: 74
End: 2024-03-12
Payer: MEDICARE

## 2024-03-12 VITALS
SYSTOLIC BLOOD PRESSURE: 122 MMHG | HEIGHT: 68 IN | HEART RATE: 70 BPM | BODY MASS INDEX: 26.37 KG/M2 | DIASTOLIC BLOOD PRESSURE: 68 MMHG | OXYGEN SATURATION: 96 % | WEIGHT: 174 LBS

## 2024-03-12 DIAGNOSIS — E78.2 MIXED HYPERLIPIDEMIA: ICD-10-CM

## 2024-03-12 DIAGNOSIS — F17.200 TOBACCO DEPENDENCE: ICD-10-CM

## 2024-03-12 DIAGNOSIS — I45.10 RIGHT BUNDLE BRANCH BLOCK: ICD-10-CM

## 2024-03-12 DIAGNOSIS — Z95.1 S/P CABG (CORONARY ARTERY BYPASS GRAFT): ICD-10-CM

## 2024-03-12 DIAGNOSIS — I25.10 CORONARY ARTERY DISEASE INVOLVING NATIVE CORONARY ARTERY OF NATIVE HEART WITHOUT ANGINA PECTORIS: ICD-10-CM

## 2024-03-12 DIAGNOSIS — I10 PRIMARY HYPERTENSION: Primary | ICD-10-CM

## 2024-03-12 DIAGNOSIS — I48.91 ATRIAL FIBRILLATION WITH RVR (HCC): ICD-10-CM

## 2024-03-12 DIAGNOSIS — I73.9 CLAUDICATION IN PERIPHERAL VASCULAR DISEASE (HCC): ICD-10-CM

## 2024-03-12 PROCEDURE — G8427 DOCREV CUR MEDS BY ELIG CLIN: HCPCS | Performed by: INTERNAL MEDICINE

## 2024-03-12 PROCEDURE — 3074F SYST BP LT 130 MM HG: CPT | Performed by: INTERNAL MEDICINE

## 2024-03-12 PROCEDURE — G8417 CALC BMI ABV UP PARAM F/U: HCPCS | Performed by: INTERNAL MEDICINE

## 2024-03-12 PROCEDURE — G8484 FLU IMMUNIZE NO ADMIN: HCPCS | Performed by: INTERNAL MEDICINE

## 2024-03-12 PROCEDURE — 3017F COLORECTAL CA SCREEN DOC REV: CPT | Performed by: INTERNAL MEDICINE

## 2024-03-12 PROCEDURE — 1124F ACP DISCUSS-NO DSCNMKR DOCD: CPT | Performed by: INTERNAL MEDICINE

## 2024-03-12 PROCEDURE — 1036F TOBACCO NON-USER: CPT | Performed by: INTERNAL MEDICINE

## 2024-03-12 PROCEDURE — 3078F DIAST BP <80 MM HG: CPT | Performed by: INTERNAL MEDICINE

## 2024-03-12 PROCEDURE — 99214 OFFICE O/P EST MOD 30 MIN: CPT | Performed by: INTERNAL MEDICINE

## 2024-03-12 NOTE — PATIENT INSTRUCTIONS
Call your urologist and verify if they want you to hold the Plavix and Aspirin, let us know what the direction is.  Cardiac clearance letter provided to patient.  No refills warranted today  No cardiology testing warranted today  Follow up in 1 year

## 2024-03-22 ENCOUNTER — OFFICE VISIT (OUTPATIENT)
Dept: PULMONOLOGY | Age: 74
End: 2024-03-22

## 2024-03-22 VITALS
TEMPERATURE: 97.3 F | RESPIRATION RATE: 16 BRPM | SYSTOLIC BLOOD PRESSURE: 138 MMHG | HEART RATE: 59 BPM | HEIGHT: 68 IN | OXYGEN SATURATION: 95 % | DIASTOLIC BLOOD PRESSURE: 47 MMHG | BODY MASS INDEX: 26.37 KG/M2 | WEIGHT: 174 LBS

## 2024-03-22 DIAGNOSIS — Z87.891 HISTORY OF TOBACCO ABUSE: ICD-10-CM

## 2024-03-22 DIAGNOSIS — Z87.891 PERSONAL HISTORY OF TOBACCO USE: ICD-10-CM

## 2024-03-22 DIAGNOSIS — J43.9 PULMONARY EMPHYSEMA, UNSPECIFIED EMPHYSEMA TYPE (HCC): ICD-10-CM

## 2024-03-22 DIAGNOSIS — C67.9 MALIGNANT NEOPLASM OF URINARY BLADDER, UNSPECIFIED SITE (HCC): ICD-10-CM

## 2024-03-22 DIAGNOSIS — J44.9 CHRONIC OBSTRUCTIVE PULMONARY DISEASE, UNSPECIFIED COPD TYPE (HCC): ICD-10-CM

## 2024-03-22 DIAGNOSIS — R91.1 LUNG NODULE: Primary | ICD-10-CM

## 2024-03-22 ASSESSMENT — ENCOUNTER SYMPTOMS
ALLERGIC/IMMUNOLOGIC NEGATIVE: 1
RESPIRATORY NEGATIVE: 1
EYES NEGATIVE: 1
GASTROINTESTINAL NEGATIVE: 1

## 2024-03-22 NOTE — PATIENT INSTRUCTIONS
1 year or four missed appointments within 2 years can be dismissed from the practice.     Please be aware that our physicians are required to work in the Intensive Care Unit at [facility].  Your appointment may need to be rescheduled if they are designated to work during your appointment time.      You may receive a survey regarding the care you received during your visit.  Your input is valuable to us.  We encourage you to complete and return your survey.  We hope you will choose us in the future for your healthcare needs.     Pt instructed of all future appointment dates & times, including radiology, labs, procedures & referrals. If procedures were scheduled preparation instructions provided. Instructions on future appointments with [facility] were given.      In the next few weeks, you will be receiving a survey from [facility] regarding your visit today.  We would greatly appreciate it if you would take just a few minutes to fill that out.  It is very important to us that our patients receive top notch care and our surveys help keep us accountable. However, if your experience was not a good one, we want to hear about that as well. This is a key way we can keep track of problems and strive to correct any for future visits.    Again, we appreciate your time and thank you for choosing [facility]!    YOSSI Medina

## 2024-03-22 NOTE — PROGRESS NOTES
Eduardo Alicia (: 1950 ) is a 73 y.o. male here for an evaluation of   No chief complaint on file.        ASSESSMENT/PLAN:   Diagnosis Orders   1. Lung nodule        2. History of tobacco abuse        3. Pulmonary emphysema, unspecified emphysema type (HCC)        4. Chronic obstructive pulmonary disease, unspecified COPD type (HCC)        5. Malignant neoplasm of urinary bladder, unspecified site (HCC)          Lung nodule    CT scan done 2023  IMPRESSION:  New 12 x 12 mm spiculated right upper lobe pulmonary nodule.     Moderate centrilobular emphysema.  Findings of COPD.     Bilateral hyperattenuating renal cortical lesions slightly increased in size  since 2015 examination.  Although these are indeterminate on this  examination, hemorrhagic/proteinaceous cysts are favored given their slow  interval growth compared 2015.  However, definitive evaluation would require  renal mass protocol CT or MRI to exclude enhancing or nodular component.            Patient has a history of T1 high-grade papillary urethral carcinoma diagnosed in 2021.      Repeat CT scan done 2023     The previously noted 6 mm right upper lobe pulmonary nodule has completely  resolved.  No change in the 2-4 mm solid right pulmonary nodules 3 mm solid  left upper lobe pulmonary nodule.  IMPRESSION:  1. Complete resolution of the previously noted 6 mm right upper lobe  pulmonary nodule.  2. New solid and stable subcentimeter bilateral pulmonary nodules bear  attention on the next imaging cycle.       Will do   LDCT in       COPD    DATE OF PROCEDURE:  2023     PFT INTERPRETATION     The patient is a 72-year-old male who underwent a PFT.  Spirometry shows  FVC to be 68%, FEV1 to be 43%, FEV1 to FVC ratio was 84%, SNB64-37% was  60%.  On the basis of this PFT, the patient has a severe obstructive  airway disease.  Please correlate clinically.           Continue with  Albuterol 2puffs as needed  Trelegy 200 1 puff

## 2024-03-22 NOTE — PROGRESS NOTES
MA Communication:  The following orders are received by verbal communication from Antonio Worley MD    Orders include:  LDCT in 11/24 follow up after

## 2024-04-08 ENCOUNTER — APPOINTMENT (RX ONLY)
Dept: URBAN - METROPOLITAN AREA CLINIC 146 | Facility: CLINIC | Age: 74
Setting detail: DERMATOLOGY
End: 2024-04-08

## 2024-04-08 DIAGNOSIS — L57.0 ACTINIC KERATOSIS: ICD-10-CM

## 2024-04-08 DIAGNOSIS — L57.8 OTHER SKIN CHANGES DUE TO CHRONIC EXPOSURE TO NONIONIZING RADIATION: ICD-10-CM

## 2024-04-08 DIAGNOSIS — L81.4 OTHER MELANIN HYPERPIGMENTATION: ICD-10-CM

## 2024-04-08 DIAGNOSIS — L82.1 OTHER SEBORRHEIC KERATOSIS: ICD-10-CM

## 2024-04-08 DIAGNOSIS — Z12.83 ENCOUNTER FOR SCREENING FOR MALIGNANT NEOPLASM OF SKIN: ICD-10-CM

## 2024-04-08 DIAGNOSIS — D18.0 HEMANGIOMA: ICD-10-CM

## 2024-04-08 DIAGNOSIS — L82.0 INFLAMED SEBORRHEIC KERATOSIS: ICD-10-CM

## 2024-04-08 DIAGNOSIS — L85.3 XEROSIS CUTIS: ICD-10-CM

## 2024-04-08 PROBLEM — D18.01 HEMANGIOMA OF SKIN AND SUBCUTANEOUS TISSUE: Status: ACTIVE | Noted: 2024-04-08

## 2024-04-08 PROCEDURE — 17003 DESTRUCT PREMALG LES 2-14: CPT | Mod: 59

## 2024-04-08 PROCEDURE — ? LIQUID NITROGEN

## 2024-04-08 PROCEDURE — ? COUNSELING

## 2024-04-08 PROCEDURE — 17000 DESTRUCT PREMALG LESION: CPT | Mod: 59

## 2024-04-08 PROCEDURE — 99203 OFFICE O/P NEW LOW 30 MIN: CPT | Mod: 25

## 2024-04-08 PROCEDURE — 17110 DESTRUCTION B9 LES UP TO 14: CPT

## 2024-04-08 RX ORDER — METOPROLOL SUCCINATE 50 MG/1
50 TABLET, EXTENDED RELEASE ORAL DAILY
Qty: 90 TABLET | Refills: 3 | Status: SHIPPED | OUTPATIENT
Start: 2024-04-08

## 2024-04-08 ASSESSMENT — LOCATION DETAILED DESCRIPTION DERM
LOCATION DETAILED: RIGHT POSTERIOR SHOULDER
LOCATION DETAILED: LEFT CENTRAL TEMPLE
LOCATION DETAILED: INFERIOR THORACIC SPINE
LOCATION DETAILED: RIGHT PROXIMAL CALF
LOCATION DETAILED: RIGHT ANTERIOR SHOULDER
LOCATION DETAILED: RIGHT SUPERIOR LATERAL UPPER BACK
LOCATION DETAILED: LEFT FOREHEAD
LOCATION DETAILED: RIGHT DISTAL DORSAL FOREARM
LOCATION DETAILED: RIGHT ELBOW
LOCATION DETAILED: LEFT CENTRAL PARIETAL SCALP
LOCATION DETAILED: LEFT SUPERIOR MEDIAL FOREHEAD
LOCATION DETAILED: RIGHT SUPERIOR FOREHEAD
LOCATION DETAILED: RIGHT PROXIMAL PRETIBIAL REGION
LOCATION DETAILED: LEFT CENTRAL MALAR CHEEK
LOCATION DETAILED: RIGHT LATERAL PROXIMAL PRETIBIAL REGION
LOCATION DETAILED: LEFT PROXIMAL DORSAL FOREARM
LOCATION DETAILED: LEFT PROXIMAL PRETIBIAL REGION
LOCATION DETAILED: LEFT PROXIMAL CALF
LOCATION DETAILED: RIGHT INFERIOR CENTRAL MALAR CHEEK
LOCATION DETAILED: LEFT POSTERIOR SHOULDER
LOCATION DETAILED: LEFT SUPERIOR HELIX
LOCATION DETAILED: LEFT MEDIAL DISTAL PRETIBIAL REGION
LOCATION DETAILED: RIGHT CLAVICULAR NECK
LOCATION DETAILED: RIGHT SUPERIOR MEDIAL MALAR CHEEK
LOCATION DETAILED: LEFT DISTAL DORSAL FOREARM

## 2024-04-08 ASSESSMENT — LOCATION SIMPLE DESCRIPTION DERM
LOCATION SIMPLE: RIGHT ANTERIOR NECK
LOCATION SIMPLE: LEFT PRETIBIAL REGION
LOCATION SIMPLE: LEFT CHEEK
LOCATION SIMPLE: LEFT FOREHEAD
LOCATION SIMPLE: SCALP
LOCATION SIMPLE: LEFT SHOULDER
LOCATION SIMPLE: LEFT FOREARM
LOCATION SIMPLE: RIGHT SHOULDER
LOCATION SIMPLE: RIGHT CALF
LOCATION SIMPLE: LEFT TEMPLE
LOCATION SIMPLE: RIGHT CHEEK
LOCATION SIMPLE: LEFT CALF
LOCATION SIMPLE: RIGHT PRETIBIAL REGION
LOCATION SIMPLE: LEFT EAR
LOCATION SIMPLE: RIGHT UPPER BACK
LOCATION SIMPLE: RIGHT FOREHEAD
LOCATION SIMPLE: UPPER BACK
LOCATION SIMPLE: RIGHT ELBOW
LOCATION SIMPLE: RIGHT FOREARM

## 2024-04-08 ASSESSMENT — LOCATION ZONE DERM
LOCATION ZONE: NECK
LOCATION ZONE: SCALP
LOCATION ZONE: TRUNK
LOCATION ZONE: ARM
LOCATION ZONE: EAR
LOCATION ZONE: FACE
LOCATION ZONE: LEG

## 2024-04-08 NOTE — PROCEDURE: LIQUID NITROGEN
Show Applicator Variable?: Yes
Render Note In Bullet Format When Appropriate: No
Duration Of Freeze Thaw-Cycle (Seconds): 5
Post-Care Instructions: I reviewed with the patient in detail post-care instructions. Patient is to wear sunprotection, and avoid picking at any of the treated lesions. Pt may apply Vaseline to crusted or scabbing areas.
Consent: The patient's consent was obtained including but not limited to risks of crusting, scabbing, blistering, scarring, darker or lighter pigmentary change, recurrence, incomplete removal and infection.
Detail Level: Simple
Number Of Freeze-Thaw Cycles: 1 freeze-thaw cycle
Medical Necessity Clause: This procedure was medically necessary because the lesions that were treated were:
Medical Necessity Information: It is in your best interest to select a reason for this procedure from the list below. All of these items fulfill various CMS LCD requirements except the new and changing color options.
Detail Level: Detailed
Spray Paint Text: The liquid nitrogen was applied to the skin utilizing a spray paint frosting technique.

## 2024-04-18 ENCOUNTER — TELEPHONE (OUTPATIENT)
Dept: CARDIOLOGY CLINIC | Age: 74
End: 2024-04-18

## 2024-04-18 NOTE — TELEPHONE ENCOUNTER
Eduardo Alicia, 1950    Cardiac Risk Assessment    What type of procedure are you having?  Cystoscopy     When is your procedure scheduled for?  4/24/2024    Medications to be stopped.  Plavix and ASA for 7 days     What physician is performing your procedure?  [facility]    Phone Number:   [phone]    Fax number to send the letter:   [phone]    Cardiologist:   LROE     Last Appointment:   3/12/2024    Next Appointment:   No upcoming

## 2024-04-19 ENCOUNTER — OFFICE VISIT (OUTPATIENT)
Dept: FAMILY MEDICINE CLINIC | Age: 74
End: 2024-04-19

## 2024-04-19 VITALS
OXYGEN SATURATION: 97 % | TEMPERATURE: 97.2 F | SYSTOLIC BLOOD PRESSURE: 130 MMHG | HEIGHT: 68 IN | HEART RATE: 73 BPM | DIASTOLIC BLOOD PRESSURE: 60 MMHG | WEIGHT: 179 LBS | BODY MASS INDEX: 27.13 KG/M2

## 2024-04-19 DIAGNOSIS — R71.8 MICROCYTOSIS: ICD-10-CM

## 2024-04-19 DIAGNOSIS — C67.9 MALIGNANT NEOPLASM OF URINARY BLADDER, UNSPECIFIED SITE (HCC): ICD-10-CM

## 2024-04-19 DIAGNOSIS — Z01.818 PREOP EXAMINATION: ICD-10-CM

## 2024-04-19 DIAGNOSIS — E11.9 TYPE 2 DIABETES MELLITUS WITHOUT COMPLICATION, UNSPECIFIED WHETHER LONG TERM INSULIN USE (HCC): ICD-10-CM

## 2024-04-19 DIAGNOSIS — Z01.818 PREOP EXAMINATION: Primary | ICD-10-CM

## 2024-04-19 PROBLEM — F33.0 MAJOR DEPRESSIVE DISORDER, RECURRENT, MILD (HCC): Status: ACTIVE | Noted: 2024-04-19

## 2024-04-19 LAB
ANION GAP SERPL CALCULATED.3IONS-SCNC: 10 MMOL/L (ref 3–16)
BILIRUBIN, POC: NORMAL
BLOOD URINE, POC: NORMAL
BUN SERPL-MCNC: 17 MG/DL (ref 7–20)
CALCIUM SERPL-MCNC: 9.3 MG/DL (ref 8.3–10.6)
CHLORIDE SERPL-SCNC: 106 MMOL/L (ref 99–110)
CLARITY, POC: CLEAR
CO2 SERPL-SCNC: 27 MMOL/L (ref 21–32)
COLOR, POC: YELLOW
CREAT SERPL-MCNC: 1.2 MG/DL (ref 0.8–1.3)
DEPRECATED RDW RBC AUTO: 26.4 % (ref 12.4–15.4)
FERRITIN SERPL IA-MCNC: 53.8 NG/ML (ref 30–400)
GFR SERPLBLD CREATININE-BSD FMLA CKD-EPI: 63 ML/MIN/{1.73_M2}
GLUCOSE SERPL-MCNC: 108 MG/DL (ref 70–99)
GLUCOSE URINE, POC: NORMAL
HCT VFR BLD AUTO: 44.3 % (ref 40.5–52.5)
HGB BLD-MCNC: 14.2 G/DL (ref 13.5–17.5)
IRON SATN MFR SERPL: 38 % (ref 20–50)
IRON SERPL-MCNC: 138 UG/DL (ref 59–158)
KETONES, POC: NORMAL
LEUKOCYTE EST, POC: NORMAL
MCH RBC QN AUTO: 23.5 PG (ref 26–34)
MCHC RBC AUTO-ENTMCNC: 32 G/DL (ref 31–36)
MCV RBC AUTO: 73.5 FL (ref 80–100)
NITRITE, POC: NORMAL
PH, POC: 5.5
PLATELET # BLD AUTO: 117 K/UL (ref 135–450)
PMV BLD AUTO: 8.6 FL (ref 5–10.5)
POTASSIUM SERPL-SCNC: 4.2 MMOL/L (ref 3.5–5.1)
PROTEIN, POC: NORMAL
RBC # BLD AUTO: 6.02 M/UL (ref 4.2–5.9)
SODIUM SERPL-SCNC: 143 MMOL/L (ref 136–145)
SPECIFIC GRAVITY, POC: >=1.03
TIBC SERPL-MCNC: 367 UG/DL (ref 260–445)
UROBILINOGEN, POC: NORMAL
WBC # BLD AUTO: 5.3 K/UL (ref 4–11)

## 2024-04-19 NOTE — PROGRESS NOTES
Iron 02/23/2024 28 (L)     TIBC 02/23/2024 346     Iron % Saturation 02/23/2024 8 (L)            Assessment:       73 y.o. patient with planned surgery as above.    Known risk factors for perioperative complications: Anemia- started on iron, recheck labs today , Bleeding concerns- on plavix and asa post ca stenting, Coronary artery disease, Hypertension, Peripheral vascular disease- 7/2022 stent with Dr. Paniagua  Current medications which may produce withdrawal symptoms if withheld perioperatively: metoprolol      Plan:     1. Preoperative workup as follows: ECG, hemoglobin, hematocrit, electrolytes, creatinine, glucose, urinalysis (urinary tract instrumentation planned)  2. Change in medication regimen before surgery: Discontinue ASA 5 days before surgery, Discontinue plavix 5 days before surgery - per cardiology   3. Prophylaxis for cardiac events with perioperative beta-blockers: Currently taking  metoprolol  ACC/AHA indications for pre-operative beta-blocker use:    Vascular surgery with history of postitive stress test  Intermediate or high risk surgery with history of CAD   Intermediate or high risk surgery with multiple clinical predictors of CAD- 2 of the following: history of compensated or prior heart failure, history of cerebrovascular disease, DM, or renal insufficiency    Routine administration of higher-dose, long-acting metoprolol in beta-blocker-naïve patients on the day of surgery, and in the absence of dose titration is associated with an overall increase in mortality.  Beta-blockers should be started days to weeks prior to surgery and titrated to pulse < 70.  4. Deep vein thrombosis prophylaxis: regimen to be chosen by surgical team  5. No contraindications to planned surgery

## 2024-04-21 LAB — BACTERIA UR CULT: NORMAL

## 2024-05-09 ENCOUNTER — APPOINTMENT (RX ONLY)
Dept: URBAN - METROPOLITAN AREA CLINIC 146 | Facility: CLINIC | Age: 74
Setting detail: DERMATOLOGY
End: 2024-05-09

## 2024-05-09 DIAGNOSIS — L57.0 ACTINIC KERATOSIS: ICD-10-CM

## 2024-05-09 DIAGNOSIS — L82.0 INFLAMED SEBORRHEIC KERATOSIS: ICD-10-CM

## 2024-05-09 PROCEDURE — ? LIQUID NITROGEN

## 2024-05-09 PROCEDURE — 17110 DESTRUCTION B9 LES UP TO 14: CPT

## 2024-05-09 PROCEDURE — ? COUNSELING

## 2024-05-09 PROCEDURE — 17003 DESTRUCT PREMALG LES 2-14: CPT | Mod: 59

## 2024-05-09 PROCEDURE — 17000 DESTRUCT PREMALG LESION: CPT | Mod: 59

## 2024-05-09 ASSESSMENT — LOCATION SIMPLE DESCRIPTION DERM
LOCATION SIMPLE: LEFT OCCIPITAL SCALP
LOCATION SIMPLE: LEFT EAR
LOCATION SIMPLE: RIGHT CHEEK

## 2024-05-09 ASSESSMENT — LOCATION ZONE DERM
LOCATION ZONE: SCALP
LOCATION ZONE: EAR
LOCATION ZONE: FACE

## 2024-05-09 ASSESSMENT — LOCATION DETAILED DESCRIPTION DERM
LOCATION DETAILED: LEFT SUPERIOR HELIX
LOCATION DETAILED: RIGHT CENTRAL MALAR CHEEK
LOCATION DETAILED: LEFT SUPERIOR OCCIPITAL SCALP

## 2024-05-09 NOTE — PROCEDURE: LIQUID NITROGEN
Show Aperture Variable?: Yes
Detail Level: Simple
Post-Care Instructions: I reviewed with the patient in detail post-care instructions. Patient is to wear sunprotection, and avoid picking at any of the treated lesions. Pt may apply Vaseline to crusted or scabbing areas.
Render Note In Bullet Format When Appropriate: No
Duration Of Freeze Thaw-Cycle (Seconds): 5
Consent: The patient's consent was obtained including but not limited to risks of crusting, scabbing, blistering, scarring, darker or lighter pigmentary change, recurrence, incomplete removal and infection.
Number Of Freeze-Thaw Cycles: 1 freeze-thaw cycle
Medical Necessity Clause: This procedure was medically necessary because the lesions that were treated were:
Spray Paint Text: The liquid nitrogen was applied to the skin utilizing a spray paint frosting technique.
Medical Necessity Information: It is in your best interest to select a reason for this procedure from the list below. All of these items fulfill various CMS LCD requirements except the new and changing color options.
Detail Level: Detailed

## 2024-08-09 RX ORDER — CLOPIDOGREL BISULFATE 75 MG/1
75 TABLET ORAL DAILY
Qty: 90 TABLET | Refills: 2 | Status: SHIPPED | OUTPATIENT
Start: 2024-08-09

## 2024-08-13 RX ORDER — FLUTICASONE FUROATE, UMECLIDINIUM BROMIDE AND VILANTEROL TRIFENATATE 200; 62.5; 25 UG/1; UG/1; UG/1
1 POWDER RESPIRATORY (INHALATION) DAILY
Qty: 60 EACH | Refills: 3 | Status: SHIPPED | OUTPATIENT
Start: 2024-08-13

## 2024-08-13 RX ORDER — ATORVASTATIN CALCIUM 40 MG/1
40 TABLET, FILM COATED ORAL NIGHTLY
Qty: 90 TABLET | Refills: 2 | Status: SHIPPED | OUTPATIENT
Start: 2024-08-13

## 2024-08-14 SDOH — HEALTH STABILITY: PHYSICAL HEALTH
ON AVERAGE, HOW MANY DAYS PER WEEK DO YOU ENGAGE IN MODERATE TO STRENUOUS EXERCISE (LIKE A BRISK WALK)?: PATIENT DECLINED

## 2024-08-14 ASSESSMENT — PATIENT HEALTH QUESTIONNAIRE - PHQ9
SUM OF ALL RESPONSES TO PHQ QUESTIONS 1-9: 0
1. LITTLE INTEREST OR PLEASURE IN DOING THINGS: NOT AT ALL
SUM OF ALL RESPONSES TO PHQ QUESTIONS 1-9: 0
SUM OF ALL RESPONSES TO PHQ QUESTIONS 1-9: 0
2. FEELING DOWN, DEPRESSED OR HOPELESS: NOT AT ALL
SUM OF ALL RESPONSES TO PHQ9 QUESTIONS 1 & 2: 0
SUM OF ALL RESPONSES TO PHQ QUESTIONS 1-9: 0

## 2024-08-14 ASSESSMENT — LIFESTYLE VARIABLES
HOW MANY STANDARD DRINKS CONTAINING ALCOHOL DO YOU HAVE ON A TYPICAL DAY: 0
HOW OFTEN DO YOU HAVE SIX OR MORE DRINKS ON ONE OCCASION: 1
HOW OFTEN DO YOU HAVE A DRINK CONTAINING ALCOHOL: 1
HOW OFTEN DO YOU HAVE A DRINK CONTAINING ALCOHOL: NEVER
HOW MANY STANDARD DRINKS CONTAINING ALCOHOL DO YOU HAVE ON A TYPICAL DAY: PATIENT DOES NOT DRINK

## 2024-08-15 ENCOUNTER — OFFICE VISIT (OUTPATIENT)
Dept: FAMILY MEDICINE CLINIC | Age: 74
End: 2024-08-15

## 2024-08-15 VITALS
HEART RATE: 74 BPM | OXYGEN SATURATION: 92 % | SYSTOLIC BLOOD PRESSURE: 120 MMHG | WEIGHT: 179.4 LBS | HEIGHT: 68 IN | BODY MASS INDEX: 27.19 KG/M2 | TEMPERATURE: 96.9 F | DIASTOLIC BLOOD PRESSURE: 58 MMHG

## 2024-08-15 DIAGNOSIS — Z00.00 MEDICARE ANNUAL WELLNESS VISIT, SUBSEQUENT: Primary | ICD-10-CM

## 2024-08-15 DIAGNOSIS — E11.9 TYPE 2 DIABETES MELLITUS WITHOUT COMPLICATION, UNSPECIFIED WHETHER LONG TERM INSULIN USE (HCC): ICD-10-CM

## 2024-08-15 DIAGNOSIS — R91.8 ABNORMAL CT SCAN, LUNG: ICD-10-CM

## 2024-08-15 DIAGNOSIS — E78.2 MIXED HYPERLIPIDEMIA: ICD-10-CM

## 2024-08-15 DIAGNOSIS — D69.6 THROMBOCYTOPENIA, UNSPECIFIED (HCC): ICD-10-CM

## 2024-08-15 DIAGNOSIS — F33.0 MAJOR DEPRESSIVE DISORDER, RECURRENT, MILD (HCC): ICD-10-CM

## 2024-08-15 DIAGNOSIS — I73.9 PAD (PERIPHERAL ARTERY DISEASE) (HCC): ICD-10-CM

## 2024-08-15 DIAGNOSIS — I25.10 CORONARY ARTERY DISEASE INVOLVING NATIVE CORONARY ARTERY OF NATIVE HEART WITHOUT ANGINA PECTORIS: ICD-10-CM

## 2024-08-15 DIAGNOSIS — Z95.1 S/P CABG (CORONARY ARTERY BYPASS GRAFT): ICD-10-CM

## 2024-08-15 SDOH — ECONOMIC STABILITY: INCOME INSECURITY: HOW HARD IS IT FOR YOU TO PAY FOR THE VERY BASICS LIKE FOOD, HOUSING, MEDICAL CARE, AND HEATING?: NOT HARD AT ALL

## 2024-08-15 SDOH — ECONOMIC STABILITY: FOOD INSECURITY: WITHIN THE PAST 12 MONTHS, YOU WORRIED THAT YOUR FOOD WOULD RUN OUT BEFORE YOU GOT MONEY TO BUY MORE.: NEVER TRUE

## 2024-08-15 SDOH — ECONOMIC STABILITY: FOOD INSECURITY: WITHIN THE PAST 12 MONTHS, THE FOOD YOU BOUGHT JUST DIDN'T LAST AND YOU DIDN'T HAVE MONEY TO GET MORE.: NEVER TRUE

## 2024-08-15 NOTE — PATIENT INSTRUCTIONS
chances of quitting for good. Quitting is one of the most important things you can do to protect your heart. It is never too late to quit. Try to avoid secondhand smoke too.     Stay at a weight that's healthy for you. Talk to your doctor if you need help losing weight.     Try to get 7 to 9 hours of sleep each night.     Limit alcohol to 2 drinks a day for men and 1 drink a day for women. Too much alcohol can cause health problems.     Manage other health problems such as diabetes, high blood pressure, and high cholesterol. If you think you may have a problem with alcohol or drug use, talk to your doctor.   Medicines    Take your medicines exactly as prescribed. Call your doctor if you think you are having a problem with your medicine.     If your doctor recommends aspirin, take the amount directed each day. Make sure you take aspirin and not another kind of pain reliever, such as acetaminophen (Tylenol).   When should you call for help?   Call 911 if you have symptoms of a heart attack. These may include:    Chest pain or pressure, or a strange feeling in the chest.     Sweating.     Shortness of breath.     Pain, pressure, or a strange feeling in the back, neck, jaw, or upper belly or in one or both shoulders or arms.     Lightheadedness or sudden weakness.     A fast or irregular heartbeat.   After you call 911, the  may tell you to chew 1 adult-strength or 2 to 4 low-dose aspirin. Wait for an ambulance. Do not try to drive yourself.  Watch closely for changes in your health, and be sure to contact your doctor if you have any problems.  Where can you learn more?  Go to https://www.CaseReader.net/patientEd and enter F075 to learn more about \"A Healthy Heart: Care Instructions.\"  Current as of: June 24, 2023  Content Version: 14.1  © 7357-8208 [employer]wise, Incorporated.   Care instructions adapted under license by [employer]. If you have questions about a medical condition or this instruction, always ask

## 2024-08-15 NOTE — PROGRESS NOTES
Medicare Annual Wellness Visit    Eduardo Alicia is here for Medicare AWV    Assessment & Plan   Medicare annual wellness visit, subsequent  Type 2 diabetes mellitus without complication, unspecified whether long term insulin use (HCC)  Thrombocytopenia, unspecified (HCC)  Major depressive disorder, recurrent, mild (HCC)  Coronary artery disease involving native coronary artery of native heart without angina pectoris  Mixed hyperlipidemia  S/P CABG (coronary artery bypass graft)  Abnormal CT scan, lung  PAD (peripheral artery disease) (HCC)    Recommendations for Preventive Services Due: see orders and patient instructions/AVS.  Recommended screening schedule for the next 5-10 years is provided to the patient in written form: see Patient Instructions/AVS.     Return in about 6 months (around 2/15/2025) for Anxiety, Depression, Hyperlipidemia.     Subjective   The following acute and/or chronic problems were also addressed today:  CAD s/p CABG x3/afib. Following with cardiology. Current regimen includes plavix, statin, asa, metoprolol. Denies cp,soa, le swelling. Does have some mckinnon, feels it is at baseline.      Abnormal CT lung: following with pulmology, concerning nodule on initial screen resolved on repeat imaging, but noted new pulm nodules. Pulm recommended 6 month follow up for repeat imaging (5/2024). He sees pulm in office 3/2024     Anxiety and depression: Stable on zoloft. Denies side effects. Denies si/hi. Normal appetite, normal sleep.      PAD: S/p angioplasty of left popliteal artery stenosis, and angioplasty and stenting of left common iliac artery stenosis with Dr. Paniagua 2022, follow up prn.     Patient's complete Health Risk Assessment and screening values have been reviewed and are found in Flowsheets. The following problems were reviewed today and where indicated follow up appointments were made and/or referrals ordered.    Positive Risk Factor Screenings with Interventions:                 Poor

## 2024-08-23 RX ORDER — FERROUS SULFATE 325(65) MG
1 TABLET ORAL
Qty: 90 TABLET | Refills: 1 | Status: SHIPPED | OUTPATIENT
Start: 2024-08-23

## 2024-08-23 NOTE — TELEPHONE ENCOUNTER
.Refill Request     CONFIRM preferred pharmacy with the patient.    If Mail Order Rx - Pend for 90 day refill.      Last Seen: Last Seen Department: 8/15/2024  Last Seen by PCP: 8/15/2024    Last Written: 2-26-24 90 with 1     If no future appointment scheduled:  Review the last OV with PCP and review information for follow-up visit,  Route STAFF MESSAGE with patient name to the  Pool for scheduling with the following information:            -  Timing of next visit           -  Visit type ie Physical, OV, etc           -  Diagnoses/Reason ie. COPD, HTN - Do not use MEDICATION, Follow-up or CHECK UP - Give reason for visit      Next Appointment:   Future Appointments   Date Time Provider Department Center   11/20/2024  2:00 PM [facility] CT [facility]CT [facility]   11/20/2024  2:40 PM Eduar Tobar MD [facility]   2/17/2025 10:30 AM Lise Salinas PA [facility]       Message sent to  to schedule appt with patient?  NO      Requested Prescriptions     Pending Prescriptions Disp Refills    ferrous sulfate (FEROSUL) 325 (65 Fe) MG tablet [Pharmacy Med Name: FeroSul 325 (65 Fe) MG Oral Tablet] 90 tablet 1     Sig: Take 1 tablet by mouth once daily with breakfast

## 2024-11-20 ENCOUNTER — HOSPITAL ENCOUNTER (OUTPATIENT)
Dept: CT IMAGING | Age: 74
Discharge: HOME | End: 2024-11-20
Payer: MEDICARE

## 2024-11-20 ENCOUNTER — OFFICE VISIT (OUTPATIENT)
Dept: PULMONOLOGY | Age: 74
End: 2024-11-20
Payer: MEDICARE

## 2024-11-20 VITALS
DIASTOLIC BLOOD PRESSURE: 78 MMHG | HEIGHT: 68 IN | RESPIRATION RATE: 16 BRPM | TEMPERATURE: 97.9 F | BODY MASS INDEX: 27.47 KG/M2 | WEIGHT: 181.25 LBS | OXYGEN SATURATION: 92 % | HEART RATE: 69 BPM | SYSTOLIC BLOOD PRESSURE: 155 MMHG

## 2024-11-20 DIAGNOSIS — R91.1 LUNG NODULE: Primary | ICD-10-CM

## 2024-11-20 DIAGNOSIS — Z87.891 FORMER SMOKER: ICD-10-CM

## 2024-11-20 DIAGNOSIS — J44.9 COPD, SEVERE (HCC): ICD-10-CM

## 2024-11-20 DIAGNOSIS — Z87.891 PERSONAL HISTORY OF TOBACCO USE: ICD-10-CM

## 2024-11-20 PROCEDURE — G0008 ADMIN INFLUENZA VIRUS VAC: HCPCS | Performed by: STUDENT IN AN ORGANIZED HEALTH CARE EDUCATION/TRAINING PROGRAM

## 2024-11-20 PROCEDURE — 1159F MED LIST DOCD IN RCRD: CPT | Performed by: STUDENT IN AN ORGANIZED HEALTH CARE EDUCATION/TRAINING PROGRAM

## 2024-11-20 PROCEDURE — 71271 CT THORAX LUNG CANCER SCR C-: CPT

## 2024-11-20 PROCEDURE — G8482 FLU IMMUNIZE ORDER/ADMIN: HCPCS | Performed by: STUDENT IN AN ORGANIZED HEALTH CARE EDUCATION/TRAINING PROGRAM

## 2024-11-20 PROCEDURE — G8427 DOCREV CUR MEDS BY ELIG CLIN: HCPCS | Performed by: STUDENT IN AN ORGANIZED HEALTH CARE EDUCATION/TRAINING PROGRAM

## 2024-11-20 PROCEDURE — G8417 CALC BMI ABV UP PARAM F/U: HCPCS | Performed by: STUDENT IN AN ORGANIZED HEALTH CARE EDUCATION/TRAINING PROGRAM

## 2024-11-20 PROCEDURE — 1036F TOBACCO NON-USER: CPT | Performed by: STUDENT IN AN ORGANIZED HEALTH CARE EDUCATION/TRAINING PROGRAM

## 2024-11-20 PROCEDURE — 99213 OFFICE O/P EST LOW 20 MIN: CPT | Performed by: STUDENT IN AN ORGANIZED HEALTH CARE EDUCATION/TRAINING PROGRAM

## 2024-11-20 PROCEDURE — 1124F ACP DISCUSS-NO DSCNMKR DOCD: CPT | Performed by: STUDENT IN AN ORGANIZED HEALTH CARE EDUCATION/TRAINING PROGRAM

## 2024-11-20 PROCEDURE — 3023F SPIROM DOC REV: CPT | Performed by: STUDENT IN AN ORGANIZED HEALTH CARE EDUCATION/TRAINING PROGRAM

## 2024-11-20 PROCEDURE — 3078F DIAST BP <80 MM HG: CPT | Performed by: STUDENT IN AN ORGANIZED HEALTH CARE EDUCATION/TRAINING PROGRAM

## 2024-11-20 PROCEDURE — 3077F SYST BP >= 140 MM HG: CPT | Performed by: STUDENT IN AN ORGANIZED HEALTH CARE EDUCATION/TRAINING PROGRAM

## 2024-11-20 PROCEDURE — 90653 IIV ADJUVANT VACCINE IM: CPT | Performed by: STUDENT IN AN ORGANIZED HEALTH CARE EDUCATION/TRAINING PROGRAM

## 2024-11-20 ASSESSMENT — ENCOUNTER SYMPTOMS
EYE REDNESS: 0
WHEEZING: 0
ABDOMINAL DISTENTION: 0
VOMITING: 0
NAUSEA: 0
EYE DISCHARGE: 0
STRIDOR: 0
COLOR CHANGE: 0
BACK PAIN: 0
ABDOMINAL PAIN: 0
COUGH: 0
CONSTIPATION: 0
DIARRHEA: 0
TROUBLE SWALLOWING: 0
SHORTNESS OF BREATH: 0
EYE PAIN: 0
SORE THROAT: 0
EYE ITCHING: 0

## 2024-11-20 NOTE — PROGRESS NOTES
[facility] Follow-up  [address] [place] [address]  [phone]        Eduardo Alicia (: 1950 ) is a 74 y.o. male here for an evaluation of   Chief Complaint   Patient presents with    Follow-up     Lung nodule former Meir pt.    Results         SUBJECTIVE/OBJECTIVE:  Patient is 74-year-old male with significant past medical history of COPD, former smoker, lung nodules that presents to [facility] pulmonary clinic for follow-up visit.  Patient has no respiratory complaints.  He is on Trelegy with albuterol as needed for shortness of breath.  He is not on oxygen therapy.  He says that he rarely uses his rescue inhaler.  He is here for follow-up of low-dose CT scan.    Patient quit smoking in 2019.      Review of Systems   Constitutional:  Negative for activity change, appetite change, chills, diaphoresis and fatigue.   HENT:  Negative for congestion, sore throat and trouble swallowing.    Eyes:  Negative for pain, discharge, redness and itching.   Respiratory:  Negative for cough, shortness of breath, wheezing and stridor.    Cardiovascular:  Negative for chest pain, palpitations and leg swelling.   Gastrointestinal:  Negative for abdominal distention, abdominal pain, constipation, diarrhea, nausea and vomiting.   Endocrine: Negative for polydipsia, polyphagia and polyuria.   Genitourinary:  Negative for difficulty urinating.   Musculoskeletal:  Negative for back pain, myalgias and neck pain.   Skin:  Negative for color change.   Neurological:  Negative for dizziness, weakness and light-headedness.   Psychiatric/Behavioral:  Negative for agitation and behavioral problems.          Vitals:    24 1432   BP: (!) 155/78   Pulse: 69   Resp: 16   Temp: 97.9 °F (36.6 °C)   TempSrc: Temporal   SpO2: 92%   Weight: 82.2 kg (181 lb 4 oz)   Height: 1.727 m (5' 8\")        Physical Exam  Constitutional:       General: He is not in acute distress.     Appearance: He is not toxic-appearing.

## 2024-11-20 NOTE — PATIENT INSTRUCTIONS
Remember to bring a list of pulmonary medications and any CPAP or BiPAP machines to your next appointment with the office.     Please keep all of your future appointments scheduled by [facility] [place] [facility] office. Out of respect for other patients and providers, you may be asked to reschedule your appointment if you arrive later than your scheduled appointment time. Appointments cancelled less than 24hrs in advance will be considered a no show. Patients with three missed appointments within 1 year or four missed appointments within 2 years can be dismissed from the practice.     Please be aware that our physicians are required to work in the Intensive Care Unit at [facility].  Your appointment may need to be rescheduled if they are designated to work during your appointment time.      You may receive a survey regarding the care you received during your visit.  Your input is valuable to us.  We encourage you to complete and return your survey.  We hope you will choose us in the future for your healthcare needs.     Pt instructed of all future appointment dates & times, including radiology, labs, procedures & referrals. If procedures were scheduled preparation instructions provided. Instructions on future appointments with [facility] were given.     In the next few weeks, you will be receiving a survey from [facility] regarding your visit today.  We would greatly appreciate it if you would take just a few minutes to fill that out.  It is very important to us that our patients receive top notch care and our surveys help keep us accountable. However, if your experience was not a good one, we want to hear about that as well. This is a key way we can keep track of problems and strive to correct any for future visits.    Again, we appreciate your time and thank you for choosing [facility]!    Melisa KAISER

## 2024-11-20 NOTE — PROGRESS NOTES
MA Communication:  The following orders are received by verbal communication from   Eduar Tobar MD    Orders include: ldct IN A YR       Fu 1 YR       hdfLU

## 2025-01-27 RX ORDER — FERROUS SULFATE 325(65) MG
1 TABLET ORAL
Qty: 90 TABLET | Refills: 1 | Status: SHIPPED | OUTPATIENT
Start: 2025-01-27

## 2025-01-27 NOTE — TELEPHONE ENCOUNTER
Refill Request     CONFIRM preferred pharmacy with the patient.    If Mail Order Rx - Pend for 90 day refill.      Last Seen: Last Seen Department: 8/15/2024  Last Seen by PCP: 8/15/2024    Last Written: 8/23/24 90 with 1 refill     If no future appointment scheduled:  Review the last OV with PCP and review information for follow-up visit,  Route STAFF MESSAGE with patient name to the  Pool for scheduling with the following information:            -  Timing of next visit           -  Visit type ie Physical, OV, etc           -  Diagnoses/Reason ie. COPD, HTN - Do not use MEDICATION, Follow-up or CHECK UP - Give reason for visit      Next Appointment:   Future Appointments   Date Time Provider Department Center   2/17/2025 10:30 AM Lise Salinas PA [facility] DE[facility]   12/1/2025  2:00 PM A CT [facility]CT [facility]   12/1/2025  2:40 PM Eduar Tobar MD [facility]       Message sent to  to schedule appt with patient?  NO      Requested Prescriptions     Pending Prescriptions Disp Refills    ferrous sulfate (FEROSUL) 325 (65 Fe) MG tablet 90 tablet 1     Sig: Take 1 tablet by mouth daily (with breakfast)

## 2025-01-31 RX ORDER — SERTRALINE HYDROCHLORIDE 100 MG/1
TABLET, FILM COATED ORAL
Qty: 90 TABLET | Refills: 1 | Status: SHIPPED | OUTPATIENT
Start: 2025-01-31

## 2025-01-31 NOTE — TELEPHONE ENCOUNTER
Refill Request     CONFIRM preferred pharmacy with the patient.    If Mail Order Rx - Pend for 90 day refill.      Last Seen: Last Seen Department: 8/15/2024  Last Seen by PCP: 8/15/2024    Last Written: 02/01/2024 90 tab 3 refills     If no future appointment scheduled:  Review the last OV with PCP and review information for follow-up visit,  Route STAFF MESSAGE with patient name to the  Pool for scheduling with the following information:            -  Timing of next visit           -  Visit type ie Physical, OV, etc           -  Diagnoses/Reason ie. COPD, HTN - Do not use MEDICATION, Follow-up or CHECK UP - Give reason for visit      Next Appointment:   Future Appointments   Date Time Provider Department Center   2/17/2025 10:30 AM Lsie Salinas PA [facility] DE[facility]   12/1/2025  2:00 PM A CT [facility]CT [facility]   12/1/2025  2:40 PM Eduar Tobar MD [facility]       Message sent to  to schedule appt with patient?  NO      Requested Prescriptions     Pending Prescriptions Disp Refills    sertraline (ZOLOFT) 100 MG tablet [Pharmacy Med Name: Sertraline HCl 100 MG Oral Tablet] 90 tablet 0     Sig: Take 1 tablet by mouth once daily

## 2025-02-16 SDOH — ECONOMIC STABILITY: FOOD INSECURITY: WITHIN THE PAST 12 MONTHS, YOU WORRIED THAT YOUR FOOD WOULD RUN OUT BEFORE YOU GOT MONEY TO BUY MORE.: NEVER TRUE

## 2025-02-16 SDOH — ECONOMIC STABILITY: INCOME INSECURITY: IN THE LAST 12 MONTHS, WAS THERE A TIME WHEN YOU WERE NOT ABLE TO PAY THE MORTGAGE OR RENT ON TIME?: NO

## 2025-02-16 SDOH — ECONOMIC STABILITY: FOOD INSECURITY: WITHIN THE PAST 12 MONTHS, THE FOOD YOU BOUGHT JUST DIDN'T LAST AND YOU DIDN'T HAVE MONEY TO GET MORE.: NEVER TRUE

## 2025-02-16 ASSESSMENT — LIFESTYLE VARIABLES
HOW MANY STANDARD DRINKS CONTAINING ALCOHOL DO YOU HAVE ON A TYPICAL DAY: PATIENT DOES NOT DRINK
HOW OFTEN DO YOU HAVE A DRINK CONTAINING ALCOHOL: NEVER

## 2025-02-16 ASSESSMENT — PATIENT HEALTH QUESTIONNAIRE - PHQ9
SUM OF ALL RESPONSES TO PHQ QUESTIONS 1-9: 0
SUM OF ALL RESPONSES TO PHQ QUESTIONS 1-9: 0
SUM OF ALL RESPONSES TO PHQ9 QUESTIONS 1 & 2: 0
2. FEELING DOWN, DEPRESSED OR HOPELESS: NOT AT ALL
1. LITTLE INTEREST OR PLEASURE IN DOING THINGS: NOT AT ALL
SUM OF ALL RESPONSES TO PHQ QUESTIONS 1-9: 0
SUM OF ALL RESPONSES TO PHQ QUESTIONS 1-9: 0

## 2025-02-25 SDOH — HEALTH STABILITY: PHYSICAL HEALTH: ON AVERAGE, HOW MANY MINUTES DO YOU ENGAGE IN EXERCISE AT THIS LEVEL?: 10 MIN

## 2025-02-25 SDOH — HEALTH STABILITY: PHYSICAL HEALTH: ON AVERAGE, HOW MANY DAYS PER WEEK DO YOU ENGAGE IN MODERATE TO STRENUOUS EXERCISE (LIKE A BRISK WALK)?: 3 DAYS

## 2025-02-25 ASSESSMENT — PATIENT HEALTH QUESTIONNAIRE - PHQ9
2. FEELING DOWN, DEPRESSED OR HOPELESS: NOT AT ALL
SUM OF ALL RESPONSES TO PHQ QUESTIONS 1-9: 0
SUM OF ALL RESPONSES TO PHQ QUESTIONS 1-9: 0
1. LITTLE INTEREST OR PLEASURE IN DOING THINGS: NOT AT ALL
SUM OF ALL RESPONSES TO PHQ QUESTIONS 1-9: 0
SUM OF ALL RESPONSES TO PHQ9 QUESTIONS 1 & 2: 0
SUM OF ALL RESPONSES TO PHQ QUESTIONS 1-9: 0

## 2025-02-25 ASSESSMENT — LIFESTYLE VARIABLES
HOW OFTEN DO YOU HAVE A DRINK CONTAINING ALCOHOL: NEVER
HOW MANY STANDARD DRINKS CONTAINING ALCOHOL DO YOU HAVE ON A TYPICAL DAY: PATIENT DOES NOT DRINK
HOW OFTEN DO YOU HAVE A DRINK CONTAINING ALCOHOL: 1
HOW MANY STANDARD DRINKS CONTAINING ALCOHOL DO YOU HAVE ON A TYPICAL DAY: 0
HOW OFTEN DO YOU HAVE SIX OR MORE DRINKS ON ONE OCCASION: 1

## 2025-02-28 ENCOUNTER — OFFICE VISIT (OUTPATIENT)
Dept: FAMILY MEDICINE CLINIC | Age: 75
End: 2025-02-28

## 2025-02-28 VITALS
BODY MASS INDEX: 27.31 KG/M2 | DIASTOLIC BLOOD PRESSURE: 68 MMHG | SYSTOLIC BLOOD PRESSURE: 130 MMHG | WEIGHT: 180.2 LBS | OXYGEN SATURATION: 98 % | HEIGHT: 68 IN | TEMPERATURE: 97.2 F | HEART RATE: 49 BPM

## 2025-02-28 DIAGNOSIS — I48.91 ATRIAL FIBRILLATION WITH RVR (HCC): ICD-10-CM

## 2025-02-28 DIAGNOSIS — E78.2 MIXED HYPERLIPIDEMIA: ICD-10-CM

## 2025-02-28 DIAGNOSIS — E55.9 VITAMIN D DEFICIENCY: ICD-10-CM

## 2025-02-28 DIAGNOSIS — E61.1 IRON DEFICIENCY: ICD-10-CM

## 2025-02-28 DIAGNOSIS — Z00.00 MEDICARE ANNUAL WELLNESS VISIT, SUBSEQUENT: ICD-10-CM

## 2025-02-28 DIAGNOSIS — F33.0 MAJOR DEPRESSIVE DISORDER, RECURRENT, MILD: ICD-10-CM

## 2025-02-28 DIAGNOSIS — J43.9 PULMONARY EMPHYSEMA, UNSPECIFIED EMPHYSEMA TYPE (HCC): ICD-10-CM

## 2025-02-28 DIAGNOSIS — I25.10 CORONARY ARTERY DISEASE INVOLVING NATIVE CORONARY ARTERY OF NATIVE HEART WITHOUT ANGINA PECTORIS: ICD-10-CM

## 2025-02-28 DIAGNOSIS — I73.9 PAD (PERIPHERAL ARTERY DISEASE): ICD-10-CM

## 2025-02-28 DIAGNOSIS — C67.9 MALIGNANT NEOPLASM OF URINARY BLADDER, UNSPECIFIED SITE (HCC): ICD-10-CM

## 2025-02-28 DIAGNOSIS — D69.6 THROMBOCYTOPENIA, UNSPECIFIED: ICD-10-CM

## 2025-02-28 DIAGNOSIS — E11.9 TYPE 2 DIABETES MELLITUS WITHOUT COMPLICATION, UNSPECIFIED WHETHER LONG TERM INSULIN USE (HCC): Primary | ICD-10-CM

## 2025-02-28 DIAGNOSIS — N18.31 STAGE 3A CHRONIC KIDNEY DISEASE (HCC): ICD-10-CM

## 2025-02-28 DIAGNOSIS — Z95.1 S/P CABG (CORONARY ARTERY BYPASS GRAFT): ICD-10-CM

## 2025-02-28 LAB — HBA1C MFR BLD: 5.9 %

## 2025-02-28 SDOH — ECONOMIC STABILITY: FOOD INSECURITY: WITHIN THE PAST 12 MONTHS, THE FOOD YOU BOUGHT JUST DIDN'T LAST AND YOU DIDN'T HAVE MONEY TO GET MORE.: NEVER TRUE

## 2025-02-28 SDOH — ECONOMIC STABILITY: FOOD INSECURITY: WITHIN THE PAST 12 MONTHS, YOU WORRIED THAT YOUR FOOD WOULD RUN OUT BEFORE YOU GOT MONEY TO BUY MORE.: NEVER TRUE

## 2025-02-28 NOTE — PATIENT INSTRUCTIONS
Eating Healthy Foods: Care Instructions  With every meal, you can make healthy food choices. Try to eat a variety of fruits, vegetables, whole grains, lean proteins, and low-fat dairy products. This can help you get the right balance of nutrients, including vitamins and minerals. Small changes add up over time. You can start by adding one healthy food to your meals each day.    Try to make half your plate fruits and vegetables, one-fourth whole grains, and one-fourth lean proteins. Try including dairy with your meals.   Eat more fruits and vegetables. Try to have them with most meals and snacks.   Foods for healthy eating        Fruits   These can be fresh, frozen, canned, or dried.  Try to choose whole fruit rather than fruit juice.  Eat a variety of colors.        Vegetables   These can be fresh, frozen, canned, or dried.  Beans, peas, and lentils count too.        Whole grains   Choose whole-grain breads, cereals, and noodles.  Try brown rice.        Lean proteins   These can include lean meat, poultry, fish, and eggs.  You can also have tofu, beans, peas, lentils, nuts, and seeds.        Dairy   Try milk, yogurt, and cheese.  Choose low-fat or fat-free when you can.  If you need to, use lactose-free milk or fortified plant-based milk products, such as soy milk.        Water   Drink water when you're thirsty.  Limit sugar-sweetened drinks, including soda, fruit drinks, and sports drinks.  Where can you learn more?  Go to https://www.[employer].net/patientEd and enter T756 to learn more about \"Eating Healthy Foods: Care Instructions.\"  Current as of: September 20, 2023  Content Version: 14.3  © 2024 [employer].   Care instructions adapted under license by [employer]. If you have questions about a medical condition or this instruction, always ask your healthcare professional. [employer], disclaims any warranty or liability for your use of this information.         Advance Directives:

## 2025-02-28 NOTE — PROGRESS NOTES
Medicare Annual Wellness Visit    Eduardo Alicia is here for Medicare AWV    Assessment & Plan   Type 2 diabetes mellitus without complication, unspecified whether long term insulin use (HCC)  -     Albumin/Creatinine Ratio, Urine  -     POCT glycosylated hemoglobin (Hb A1C) 5.9  - at goal, continue with dietary control     Thrombocytopenia, unspecified  -     Path Review, Smear; Future  -     CBC with Auto Differential; Future  - per chart review dating back to 2023. Likely 2/2 to plavix and asa. Given fluctuation and history of cabg as well as PAD with stenting, recommend continuing plavix for now. If plt drop ~50, would consider hematology consult.     Major depressive disorder, recurrent, mild  - Chronic, stable on zoloft, continue.     Coronary artery disease involving native coronary artery of native heart without angina pectoris  S/P CABG (coronary artery bypass graft)  - chronic, stable on current medication regimen.   - follows with cardiology annually, nothing on exam today that would warrant change to treatment plan. Will reevaluation pending labs.   Mixed hyperlipidemia  -     Lipid Panel; Future  -     Comprehensive Metabolic Panel; Future  - on statin, labs per orders.     PAD (peripheral artery disease)  - following with vascular prn. Remains asymptomatic, continue plavix, asa and lipitor.     Atrial fibrillation with RVR (HCC)  -     Comprehensive Metabolic Panel; Future  -     TSH reflex to FT4, FT3; Future  - bradycardic on exam today which is new. Pt to monitor at home and send in readings. Given sporadic dizziness, if persistently bradycardic, would consider decrease in metoprolol to 25 mg daily. Pt to monitor for the next few days and send in [employer] message.   Pulmonary emphysema, unspecified emphysema type (HCC)  Vitamin D deficiency  -     Vitamin D 25 Hydroxy; Future  - follows with pulm annually, due for repeat scan 9/2025  Iron deficiency  -     Iron and TIBC; Future  - normal on last labs,

## 2025-03-01 LAB
25(OH)D3 SERPL-MCNC: 30.5 NG/ML
ACANTHOCYTES BLD QL SMEAR: ABNORMAL
ALBUMIN SERPL-MCNC: 4.3 G/DL (ref 3.4–5)
ALBUMIN/GLOB SERPL: 1.4 {RATIO} (ref 1.1–2.2)
ALP SERPL-CCNC: 110 U/L (ref 40–129)
ALT SERPL-CCNC: 19 U/L (ref 10–40)
ANION GAP SERPL CALCULATED.3IONS-SCNC: 11 MMOL/L (ref 3–16)
ANISOCYTOSIS BLD QL SMEAR: ABNORMAL
AST SERPL-CCNC: 20 U/L (ref 15–37)
BASOPHILS # BLD: 0 K/UL (ref 0–0.2)
BASOPHILS NFR BLD: 0 %
BILIRUB SERPL-MCNC: 0.6 MG/DL (ref 0–1)
BUN SERPL-MCNC: 15 MG/DL (ref 7–20)
CALCIUM SERPL-MCNC: 9.9 MG/DL (ref 8.3–10.6)
CHLORIDE SERPL-SCNC: 104 MMOL/L (ref 99–110)
CHOLEST SERPL-MCNC: 150 MG/DL (ref 0–199)
CO2 SERPL-SCNC: 28 MMOL/L (ref 21–32)
CREAT SERPL-MCNC: 1.1 MG/DL (ref 0.8–1.3)
DEPRECATED RDW RBC AUTO: 16.8 % (ref 12.4–15.4)
EOSINOPHIL # BLD: 0.1 K/UL (ref 0–0.6)
EOSINOPHIL NFR BLD: 2 %
GFR SERPLBLD CREATININE-BSD FMLA CKD-EPI: 69 ML/MIN/{1.73_M2}
GLUCOSE SERPL-MCNC: 98 MG/DL (ref 70–99)
HCT VFR BLD AUTO: 48.5 % (ref 40.5–52.5)
HDLC SERPL-MCNC: 35 MG/DL (ref 40–60)
HGB BLD-MCNC: 15.9 G/DL (ref 13.5–17.5)
IRON SATN MFR SERPL: 24 % (ref 20–50)
IRON SERPL-MCNC: 72 UG/DL (ref 59–158)
LDLC SERPL CALC-MCNC: 86 MG/DL
LYMPHOCYTES # BLD: 2.7 K/UL (ref 1–5.1)
LYMPHOCYTES NFR BLD: 34 %
MCH RBC QN AUTO: 26.3 PG (ref 26–34)
MCHC RBC AUTO-ENTMCNC: 32.7 G/DL (ref 31–36)
MCV RBC AUTO: 80.4 FL (ref 80–100)
MICROCYTES BLD QL SMEAR: ABNORMAL
MONOCYTES # BLD: 0.9 K/UL (ref 0–1.3)
MONOCYTES NFR BLD: 12 %
NEUTROPHILS # BLD: 3.6 K/UL (ref 1.7–7.7)
NEUTROPHILS NFR BLD: 49 %
PATH INTERP BLD-IMP: NORMAL
PLATELET # BLD AUTO: 132 K/UL (ref 135–450)
PMV BLD AUTO: 8.3 FL (ref 5–10.5)
POTASSIUM SERPL-SCNC: 4.6 MMOL/L (ref 3.5–5.1)
PROT SERPL-MCNC: 7.3 G/DL (ref 6.4–8.2)
RBC # BLD AUTO: 6.03 M/UL (ref 4.2–5.9)
SLIDE REVIEW: ABNORMAL
SODIUM SERPL-SCNC: 143 MMOL/L (ref 136–145)
TIBC SERPL-MCNC: 297 UG/DL (ref 260–445)
TRIGL SERPL-MCNC: 146 MG/DL (ref 0–150)
TSH SERPL DL<=0.005 MIU/L-ACNC: 1.72 UIU/ML (ref 0.27–4.2)
VARIANT LYMPHS NFR BLD MANUAL: 3 % (ref 0–6)
VLDLC SERPL CALC-MCNC: 29 MG/DL
WBC # BLD AUTO: 7.4 K/UL (ref 4–11)

## 2025-03-04 LAB — PATH INTERP BLD-IMP: NORMAL

## 2025-03-20 ENCOUNTER — TELEPHONE (OUTPATIENT)
Dept: CARDIOLOGY CLINIC | Age: 75
End: 2025-03-20

## 2025-03-20 NOTE — TELEPHONE ENCOUNTER
CARDIAC CLEARANCE REQUEST    What type of procedure are you having: prostate resection     Are you taking any blood thinners: Asprin, Clopidogrel    Type on anesthesia:    When is your procedure scheduled for: 4/3/2025    What physician is performing your procedure: Dr. Augustine    Phone Number: [phone]    Fax number to send the letter: [phone]    LOV 3/12/2024

## 2025-03-23 NOTE — TELEPHONE ENCOUNTER
Patient has not been evaluated by us in person in a year.  It is difficult to provide accurate preop cardiac risk assessment without examining him.  Can we bring him in to see Dr. Goodrich or one of the providers as soon as possible before 4/3?  If not, we will have to document and resume high cardiac risk.  This far out from his CABG, it is reasonably safe to hold Plavix and maybe even discontinue it permanently.  Prefer to continue aspirin 81 in the perioperative phase if possible.

## 2025-04-02 ENCOUNTER — OFFICE VISIT (OUTPATIENT)
Dept: FAMILY MEDICINE CLINIC | Age: 75
End: 2025-04-02

## 2025-04-02 VITALS
BODY MASS INDEX: 26.95 KG/M2 | WEIGHT: 177.8 LBS | DIASTOLIC BLOOD PRESSURE: 52 MMHG | TEMPERATURE: 97 F | HEIGHT: 68 IN | SYSTOLIC BLOOD PRESSURE: 122 MMHG | OXYGEN SATURATION: 97 % | HEART RATE: 54 BPM

## 2025-04-02 DIAGNOSIS — E11.9 TYPE 2 DIABETES MELLITUS WITHOUT COMPLICATION, UNSPECIFIED WHETHER LONG TERM INSULIN USE: Primary | ICD-10-CM

## 2025-04-02 DIAGNOSIS — R82.998 LEUKOCYTES IN URINE: ICD-10-CM

## 2025-04-02 DIAGNOSIS — Z01.818 PRE-OP EVALUATION: ICD-10-CM

## 2025-04-02 LAB
BILIRUBIN, POC: NEGATIVE
BLOOD URINE, POC: NEGATIVE
CLARITY, POC: NORMAL
COLOR, POC: YELLOW
GLUCOSE URINE, POC: NEGATIVE MG/DL
KETONES, POC: NEGATIVE MG/DL
LEUKOCYTE EST, POC: NORMAL
NITRITE, POC: NEGATIVE
PH, POC: 5.5
PROTEIN, POC: 100 MG/DL
SPECIFIC GRAVITY, POC: >=1.03
UROBILINOGEN, POC: 1 MG/DL

## 2025-04-02 SDOH — ECONOMIC STABILITY: FOOD INSECURITY: WITHIN THE PAST 12 MONTHS, YOU WORRIED THAT YOUR FOOD WOULD RUN OUT BEFORE YOU GOT MONEY TO BUY MORE.: NEVER TRUE

## 2025-04-02 SDOH — ECONOMIC STABILITY: FOOD INSECURITY: WITHIN THE PAST 12 MONTHS, THE FOOD YOU BOUGHT JUST DIDN'T LAST AND YOU DIDN'T HAVE MONEY TO GET MORE.: NEVER TRUE

## 2025-04-02 ASSESSMENT — PATIENT HEALTH QUESTIONNAIRE - PHQ9
1. LITTLE INTEREST OR PLEASURE IN DOING THINGS: NOT AT ALL
SUM OF ALL RESPONSES TO PHQ QUESTIONS 1-9: 0
7. TROUBLE CONCENTRATING ON THINGS, SUCH AS READING THE NEWSPAPER OR WATCHING TELEVISION: NOT AT ALL
3. TROUBLE FALLING OR STAYING ASLEEP: NOT AT ALL
5. POOR APPETITE OR OVEREATING: NOT AT ALL
2. FEELING DOWN, DEPRESSED OR HOPELESS: NOT AT ALL
SUM OF ALL RESPONSES TO PHQ QUESTIONS 1-9: 0
SUM OF ALL RESPONSES TO PHQ QUESTIONS 1-9: 0
10. IF YOU CHECKED OFF ANY PROBLEMS, HOW DIFFICULT HAVE THESE PROBLEMS MADE IT FOR YOU TO DO YOUR WORK, TAKE CARE OF THINGS AT HOME, OR GET ALONG WITH OTHER PEOPLE: NOT DIFFICULT AT ALL
4. FEELING TIRED OR HAVING LITTLE ENERGY: NOT AT ALL
8. MOVING OR SPEAKING SO SLOWLY THAT OTHER PEOPLE COULD HAVE NOTICED. OR THE OPPOSITE, BEING SO FIGETY OR RESTLESS THAT YOU HAVE BEEN MOVING AROUND A LOT MORE THAN USUAL: NOT AT ALL
SUM OF ALL RESPONSES TO PHQ QUESTIONS 1-9: 0
6. FEELING BAD ABOUT YOURSELF - OR THAT YOU ARE A FAILURE OR HAVE LET YOURSELF OR YOUR FAMILY DOWN: NOT AT ALL
9. THOUGHTS THAT YOU WOULD BE BETTER OFF DEAD, OR OF HURTING YOURSELF: NOT AT ALL

## 2025-04-02 ASSESSMENT — ANXIETY QUESTIONNAIRES
4. TROUBLE RELAXING: NOT AT ALL
6. BECOMING EASILY ANNOYED OR IRRITABLE: NOT AT ALL
IF YOU CHECKED OFF ANY PROBLEMS ON THIS QUESTIONNAIRE, HOW DIFFICULT HAVE THESE PROBLEMS MADE IT FOR YOU TO DO YOUR WORK, TAKE CARE OF THINGS AT HOME, OR GET ALONG WITH OTHER PEOPLE: NOT DIFFICULT AT ALL
1. FEELING NERVOUS, ANXIOUS, OR ON EDGE: NOT AT ALL
3. WORRYING TOO MUCH ABOUT DIFFERENT THINGS: NOT AT ALL
GAD7 TOTAL SCORE: 0
2. NOT BEING ABLE TO STOP OR CONTROL WORRYING: NOT AT ALL
7. FEELING AFRAID AS IF SOMETHING AWFUL MIGHT HAPPEN: NOT AT ALL
5. BEING SO RESTLESS THAT IT IS HARD TO SIT STILL: NOT AT ALL

## 2025-04-02 NOTE — PROGRESS NOTES
\"Have you been to the ER, urgent care clinic since your last visit?  Hospitalized since your last visit?\"    NO    “Have you seen or consulted any other health care providers outside our system since your last visit?”    YES - When: approximately 3  weeks ago.  Where and Why: Dr. Augustine with [facility] .

## 2025-04-02 NOTE — PROGRESS NOTES
Preoperative Consultation      Eduardo Alicia  YOB: 1950    Date of Service:  4/2/2025    Vitals:    04/02/25 1056   BP: (!) 122/52   BP Site: Right Upper Arm   Patient Position: Sitting   BP Cuff Size: Medium Adult   Pulse: 54   Temp: 97 °F (36.1 °C)   TempSrc: Temporal   SpO2: 97%   Weight: 80.6 kg (177 lb 12.8 oz)   Height: 1.727 m (5' 7.99\")      Wt Readings from Last 2 Encounters:   04/02/25 80.6 kg (177 lb 12.8 oz)   02/28/25 81.7 kg (180 lb 3.2 oz)     BP Readings from Last 3 Encounters:   04/02/25 (!) 122/52   02/28/25 130/68   11/20/24 (!) 155/78        Chief Complaint   Patient presents with    Other    Pre-op Exam     [facility]-cystoscopy-hx bladder cancer-Dr. Augustine        Allergies   Allergen Reactions    Penicillins Other (See Comments)     UNKNOWN, CHILD     Outpatient Medications Marked as Taking for the 4/2/25 encounter (Office Visit) with Lise Salinas PA   Medication Sig Dispense Refill    sertraline (ZOLOFT) 100 MG tablet Take 1 tablet by mouth once daily 90 tablet 1    atorvastatin (LIPITOR) 40 MG tablet Take 1 tablet by mouth nightly 90 tablet 2    fluticasone-umeclidin-vilant (TRELEGY ELLIPTA) 200-62.5-25 MCG/ACT AEPB inhaler Inhale 1 puff by mouth once daily 60 each 3    clopidogrel (PLAVIX) 75 MG tablet Take 1 tablet by mouth daily 90 tablet 2    metoprolol succinate (TOPROL XL) 50 MG extended release tablet Take 1 tablet by mouth once daily 90 tablet 3    albuterol sulfate HFA (VENTOLIN HFA) 108 (90 Base) MCG/ACT inhaler Inhale 2 puffs into the lungs 4 times daily as needed for Wheezing 18 g 5    aspirin 81 MG EC tablet Take 1 tablet by mouth daily 30 tablet 0    methIMAzole (TAPAZOLE) 5 MG tablet Take 1 tablet by mouth daily      oxybutynin (DITROPAN) 5 MG tablet TAKE 1 TABLET BY MOUTH TWICE DAILY      tamsulosin (FLOMAX) 0.4 MG capsule TAKE 1 CAPSULE BY MOUTH IN THE EVENING         This patient presents to the office today for a preoperative consultation at the

## 2025-04-02 NOTE — PROGRESS NOTES
Urine culture and microalbumin sent to the lab for testing.   Labeled and placed in bag with orders. One gray tube and one white tube  (ALL URINE CX TO BE PLACED IN THE FRIDGE)

## 2025-04-03 LAB
CREAT UR-MCNC: 188 MG/DL (ref 39–259)
MICROALBUMIN UR DL<=1MG/L-MCNC: 24 MG/DL
MICROALBUMIN/CREAT UR: 127.7 MG/G (ref 0–30)

## 2025-04-04 LAB — BACTERIA UR CULT: NORMAL

## 2025-04-08 ENCOUNTER — OFFICE VISIT (OUTPATIENT)
Dept: CARDIOLOGY CLINIC | Age: 75
End: 2025-04-08
Payer: MEDICARE

## 2025-04-08 VITALS
HEART RATE: 74 BPM | OXYGEN SATURATION: 98 % | DIASTOLIC BLOOD PRESSURE: 72 MMHG | BODY MASS INDEX: 27.2 KG/M2 | WEIGHT: 179.5 LBS | SYSTOLIC BLOOD PRESSURE: 140 MMHG | HEIGHT: 68 IN

## 2025-04-08 DIAGNOSIS — I10 PRIMARY HYPERTENSION: ICD-10-CM

## 2025-04-08 DIAGNOSIS — E78.2 MIXED HYPERLIPIDEMIA: ICD-10-CM

## 2025-04-08 DIAGNOSIS — F17.200 TOBACCO DEPENDENCE: ICD-10-CM

## 2025-04-08 DIAGNOSIS — I73.9 PAD (PERIPHERAL ARTERY DISEASE): ICD-10-CM

## 2025-04-08 DIAGNOSIS — I48.91 ATRIAL FIBRILLATION WITH RVR (HCC): ICD-10-CM

## 2025-04-08 DIAGNOSIS — Z95.1 S/P CABG (CORONARY ARTERY BYPASS GRAFT): ICD-10-CM

## 2025-04-08 DIAGNOSIS — I25.10 CORONARY ARTERY DISEASE INVOLVING NATIVE CORONARY ARTERY OF NATIVE HEART WITHOUT ANGINA PECTORIS: Primary | ICD-10-CM

## 2025-04-08 PROCEDURE — 99214 OFFICE O/P EST MOD 30 MIN: CPT | Performed by: INTERNAL MEDICINE

## 2025-04-08 PROCEDURE — 3078F DIAST BP <80 MM HG: CPT | Performed by: INTERNAL MEDICINE

## 2025-04-08 PROCEDURE — 1159F MED LIST DOCD IN RCRD: CPT | Performed by: INTERNAL MEDICINE

## 2025-04-08 PROCEDURE — G8427 DOCREV CUR MEDS BY ELIG CLIN: HCPCS | Performed by: INTERNAL MEDICINE

## 2025-04-08 PROCEDURE — 93000 ELECTROCARDIOGRAM COMPLETE: CPT | Performed by: INTERNAL MEDICINE

## 2025-04-08 PROCEDURE — G8417 CALC BMI ABV UP PARAM F/U: HCPCS | Performed by: INTERNAL MEDICINE

## 2025-04-08 PROCEDURE — 1036F TOBACCO NON-USER: CPT | Performed by: INTERNAL MEDICINE

## 2025-04-08 PROCEDURE — 3077F SYST BP >= 140 MM HG: CPT | Performed by: INTERNAL MEDICINE

## 2025-04-08 PROCEDURE — 1124F ACP DISCUSS-NO DSCNMKR DOCD: CPT | Performed by: INTERNAL MEDICINE

## 2025-04-08 NOTE — PATIENT INSTRUCTIONS
Continue current cardiac medications: Aspirin 81 mg for cad / ashd, Lipitor 40 mg, Plavix 75 mg, Toprol XL 50 mg  Medications reviewed. Medications refilled as warranted.   You are cleared at intermediate cardiovascular risk for planned procedure / surgery. Okay to hold Plavix as requested; restart after procedure.  No cardiac testing warranted today.   Follow up with me in 1 year or sooner if needed.

## 2025-04-08 NOTE — PROGRESS NOTES
50 mg  Medications reviewed. Medications refilled as warranted.   You are cleared at intermediate cardiovascular risk for planned procedure / surgery. Okay to hold Plavix as requested; restart after procedure.  No cardiac testing warranted today.   Follow up with me in 1 year or sooner if needed.           Scribe's attestation: This note was scribed in the presence of Dr. Ander Goodrich M.D. By Klaudia Lynne RN    I, Dr Ander Goodrich, personally performed the services described in this documentation, as scribed by the above signed scribe in my presence.  It is both accurate and complete to my knowledge.  I agree with the details independently gathered by the clinical support staff and the scribed note accurately describes my personal service to the patient.            Thank you for allowing us to participate in the care of Eduardo Alicia. Please call me with any questions [phone].    Ander Goodrich MD, [facility]   Interventional Cardiologist  [facility]  [phone] [place] Office  [phone] [place] Office  4/8/2025 2:19 PM

## 2025-04-16 RX ORDER — METOPROLOL SUCCINATE 50 MG/1
50 TABLET, EXTENDED RELEASE ORAL DAILY
Qty: 90 TABLET | Refills: 3 | Status: SHIPPED | OUTPATIENT
Start: 2025-04-16

## 2025-07-03 RX ORDER — ATORVASTATIN CALCIUM 40 MG/1
40 TABLET, FILM COATED ORAL NIGHTLY
Qty: 90 TABLET | Refills: 0 | Status: SHIPPED | OUTPATIENT
Start: 2025-07-03

## 2025-07-03 RX ORDER — CLOPIDOGREL BISULFATE 75 MG/1
75 TABLET ORAL DAILY
Qty: 90 TABLET | Refills: 2 | Status: SHIPPED | OUTPATIENT
Start: 2025-07-03

## 2025-07-03 NOTE — TELEPHONE ENCOUNTER
Last Office Visit: 4/8/2025 Provider: LORE  **Is provider OOT? Yes    Next Office Visit:  **If no OV, when does pt need to be seen? in 1 year(s)      LAST LABS:   CBC:  Lab Results   Component Value Date    WBC 7.4 02/28/2025    HGB 15.9 02/28/2025    HCT 48.5 02/28/2025    MCV 80.4 02/28/2025     (L) 02/28/2025    LYMPHOPCT 34.0 02/28/2025    RBC 6.03 (H) 02/28/2025    MCH 26.3 02/28/2025    MCHC 32.7 02/28/2025    RDW 16.8 (H) 02/28/2025           BMP:  Lab Results   Component Value Date/Time     02/28/2025 11:53 AM    K 4.6 02/28/2025 11:53 AM    K 4.1 07/12/2022 09:18 AM     02/28/2025 11:53 AM    CO2 28 02/28/2025 11:53 AM    BUN 15 02/28/2025 11:53 AM    CREATININE 1.1 02/28/2025 11:53 AM    GLUCOSE 98 02/28/2025 11:53 AM    CALCIUM 9.9 02/28/2025 11:53 AM    LABGLOM 69 02/28/2025 11:53 AM    LABGLOM 63 04/19/2024 09:17 AM      Lab orders needed? no

## 2025-07-03 NOTE — TELEPHONE ENCOUNTER
Last Office Visit: 4/8/2025 Provider: LORE  **Is provider OOT? Yes    Next Office Visit: Provider: LORE  **If no OV, when does pt need to be seen? in 1 year(s)  **Has patient already had 30 day supply? No    LAST LABS:   CMP:  Lab Results   Component Value Date     02/28/2025    K 4.6 02/28/2025     02/28/2025    CO2 28 02/28/2025    BUN 15 02/28/2025    CREATININE 1.1 02/28/2025    GLUCOSE 98 02/28/2025    CALCIUM 9.9 02/28/2025    BILITOT 0.6 02/28/2025    ALKPHOS 110 02/28/2025    AST 20 02/28/2025    ALT 19 02/28/2025    LABGLOM 69 02/28/2025    GFRAA >60 10/03/2022    AGRATIO 1.4 02/28/2025    GLOB 2.7 08/26/2021         Lipid:  Lab Results   Component Value Date    CHOL 150 02/28/2025    TRIG 146 02/28/2025    HDL 35 (L) 02/28/2025    LDL 86 02/28/2025    VLDL 29 02/28/2025     Lab orders needed? no

## 2025-08-01 RX ORDER — SERTRALINE HYDROCHLORIDE 100 MG/1
100 TABLET, FILM COATED ORAL DAILY
Qty: 90 TABLET | Refills: 1 | Status: SHIPPED | OUTPATIENT
Start: 2025-08-01

## 2025-08-01 NOTE — TELEPHONE ENCOUNTER
.Refill Request     CONFIRM preferred pharmacy with the patient.    If Mail Order Rx - Pend for 90 day refill.      Last Seen: Last Seen Department: 4/2/2025  Last Seen by PCP: 4/2/2025    Last Written: 1-31-25 90 with 1    If no future appointment scheduled:  Review the last OV with PCP and review information for follow-up visit,  Route STAFF MESSAGE with patient name to the  Pool for scheduling with the following information:            -  Timing of next visit           -  Visit type ie Physical, OV, etc           -  Diagnoses/Reason ie. COPD, HTN - Do not use MEDICATION, Follow-up or CHECK UP - Give reason for visit      Next Appointment:   Future Appointments   Date Time Provider Department Center   12/1/2025  2:00 PM [facility]   12/1/2025  2:40 PM Eduar Tobar MD [facility]       Message sent to  to schedule appt with patient?  NO      Requested Prescriptions     Pending Prescriptions Disp Refills    sertraline (ZOLOFT) 100 MG tablet [Pharmacy Med Name: Sertraline HCl 100 MG Oral Tablet] 90 tablet 1     Sig: Take 1 tablet by mouth once daily

## 2025-08-25 RX ORDER — ALBUTEROL SULFATE 90 UG/1
2 INHALANT RESPIRATORY (INHALATION) 4 TIMES DAILY PRN
Qty: 18 G | Refills: 5 | Status: SHIPPED | OUTPATIENT
Start: 2025-08-25

## (undated) DEVICE — SUTURE VCRL + SZ 3-0 L27IN ABSRB WHT CT-1 1/2 CIR VCP258H

## (undated) DEVICE — TIP APPL TOP 2 SPRY

## (undated) DEVICE — Device

## (undated) DEVICE — GAUZE,SPONGE,4"X4",16PLY,XRAY,STRL,LF: Brand: MEDLINE

## (undated) DEVICE — NEEDLE ECHOGENIC 22GA L3125IN INSUL W 30DEG BVL EXTN SET

## (undated) DEVICE — PUNCH AORT DIA4MM LNG HNDL

## (undated) DEVICE — BLADE ES ELASTOMERIC COAT INSUL DURABLE BEND UPTO 90DEG

## (undated) DEVICE — SUTURE NONABSORBABLE MONOFILAMENT 7-0 BV-1 1X24 IN PROLENE 8702H

## (undated) DEVICE — LIQUIBAND RAPID ADHESIVE 36/CS 0.8ML: Brand: MEDLINE

## (undated) DEVICE — CATHETER THORACENTHESIS 9 FRX20 IN EYES TOP

## (undated) DEVICE — SUTURE SZ 7 L18IN NONABSORBABLE SIL CCS L48MM 1/2 CIR STRNM M655G

## (undated) DEVICE — BLADE RETRCT 3 SH FNGR ASST

## (undated) DEVICE — SUTURE ETHBND EXCEL SZ 0 L18IN NONABSORBABLE GRN L36MM CT-1 CX21D

## (undated) DEVICE — VESSEL HARVESTING KIT 7 MM ENDOSCP FOR PWR SUPL

## (undated) DEVICE — BAG DRNGE COMB PK

## (undated) DEVICE — CANNULA PERF 7FR L5.5IN AORT ROOT RADPQ STD TIP W/ VENT LN

## (undated) DEVICE — SUTURE MCRYL + SZ 4-0 L18IN ABSRB UD L19MM PS-2 3/8 CIR MCP496G

## (undated) DEVICE — DRAIN SURG SGL COLL PT TB FOR ATS BG OASIS

## (undated) DEVICE — BLADE OPHTH 180DEG CUT SURF BLU STR SHRP DBL BVL GRINDLESS

## (undated) DEVICE — EVERGRIP INSERT SET 86MM: Brand: FOGARTY EVERGRIP

## (undated) DEVICE — SUTURE VCRL SZ 0 L27IN ABSRB UD L36MM CT-1 1/2 CIR J260H

## (undated) DEVICE — ELECTRODE,CUTTING,STERILE.24FR: Brand: N.A.

## (undated) DEVICE — SUTURE NONABSORBABLE MONOFILAMENT 4-0 RB-1 36 IN BLU PROLENE 8557H

## (undated) DEVICE — KIT BLWR MISTER 5P 15L W/ TBNG SET IRRIG MIST TO IMPROVE

## (undated) DEVICE — Device: Brand: LEVEL 1

## (undated) DEVICE — STERNUM BLADE, OFFSET (31.7 X 0.64 X 6.3MM)

## (undated) DEVICE — CONTAINER,SPECIMEN,OR STERILE,4OZ: Brand: MEDLINE

## (undated) DEVICE — [HIGH FLOW INSUFFLATOR,  DO NOT USE IF PACKAGE IS DAMAGED,  KEEP DRY,  KEEP AWAY FROM SUNLIGHT,  PROTECT FROM HEAT AND RADIOACTIVE SOURCES.]: Brand: PNEUMOSURE

## (undated) DEVICE — TIP APPL 20GA 4IN GEL PLT 2 CANN

## (undated) DEVICE — ASPIRATION/ANTICOAGULATION SET: Brand: HAEMONETICS CELL SAVER SYSTEM

## (undated) DEVICE — APPLICATOR SURG SEAL FIBRIJET

## (undated) DEVICE — KIT,ANTI FOG,W/SPONGE & FLUID,SOFT PACK: Brand: MEDLINE

## (undated) DEVICE — PACK PROCEDURE SURG OPN HRT A BASIC

## (undated) DEVICE — SUTURE ETHBND EXCEL SZ 2-0 L36IN NONABSORBABLE GRN L26MM SH X523H

## (undated) DEVICE — SUTURE PROL SZ 6-0 L24IN NONABSORBABLE BLU L13MM C-1 3/8 8726H

## (undated) DEVICE — SOLUTION IRRIG 2000ML 0.9% SOD CHL USP UROMATIC PLAS CONT

## (undated) DEVICE — RETRACTOR SURG INSRT SUT HLD OCTOBASE

## (undated) DEVICE — CYSTO: Brand: MEDLINE INDUSTRIES, INC.

## (undated) DEVICE — DRAIN,WOUND,ROUND,24FR,5/16",FULL-FLUTED: Brand: MEDLINE

## (undated) DEVICE — ELECTRODE LOOP 24FR R ANG MPLR CUT

## (undated) DEVICE — GLOVE ORANGE PI 7 1/2   MSG9075

## (undated) DEVICE — CANNULA ART 21FR L14IN VENT 3/8IN CONN SFT FLO ANG TIP W/

## (undated) DEVICE — APPLICATOR MEDICATED 10.5 CC SOLUTION HI LT ORNG CHLORAPREP

## (undated) DEVICE — CONNECTOR PERF W3/8XH0.25XL0.25IN BASE UNEQUAL Y SHP W/O

## (undated) DEVICE — LEAD PACEMKR MYOCARDIAL UNIPOLAR TEMP

## (undated) DEVICE — APPLICATOR  COTTON-TIPPED 6 IN WOOD STRL

## (undated) DEVICE — COVER US PRB W12XL244CM SURGICAL INTRAOPERATIVE PLAS TAPR L

## (undated) DEVICE — KIT APPL 11:1 PROC W/ FIBRIJET MED CUP APPL TIP TY

## (undated) DEVICE — STERILE POLYISOPRENE POWDER-FREE SURGICAL GLOVES: Brand: PROTEXIS

## (undated) DEVICE — SUTURE VCRL SZ 3-0 L27IN ABSRB UD L26MM SH 1/2 CIR J416H

## (undated) DEVICE — CANNULA PERF L15IN DIA29FR VEN 3 STG THN WALL DSGN W  VENT

## (undated) DEVICE — 20 ML SYRINGE LUER-LOCK TIP: Brand: MONOJECT

## (undated) DEVICE — COVER LT HNDL PLAS RIG 2 PER PK

## (undated) DEVICE — LINE TBL CSC-14 FOR CARDPLG DEL SYS

## (undated) DEVICE — APPLICATOR LNG TP 12.5IN

## (undated) DEVICE — SUTURE PROL 4-0 L36IN NONABSORBABLE BLU V-7 L26MM 1/2 CIR 8975H

## (undated) DEVICE — SPONGE LAP W18XL18IN WHT COT 4 PLY FLD STRUNG RADPQ DISP ST 2 PER PACK

## (undated) DEVICE — SURGIFOAM SPNG SZ 100